# Patient Record
Sex: FEMALE | Employment: UNEMPLOYED | URBAN - METROPOLITAN AREA
[De-identification: names, ages, dates, MRNs, and addresses within clinical notes are randomized per-mention and may not be internally consistent; named-entity substitution may affect disease eponyms.]

---

## 2019-02-19 ENCOUNTER — OFFICE VISIT - RIVER FALLS (OUTPATIENT)
Dept: FAMILY MEDICINE | Facility: CLINIC | Age: 24
End: 2019-02-19

## 2019-02-19 ASSESSMENT — MIFFLIN-ST. JEOR: SCORE: 2014.1

## 2019-03-20 ENCOUNTER — OFFICE VISIT - RIVER FALLS (OUTPATIENT)
Dept: FAMILY MEDICINE | Facility: CLINIC | Age: 24
End: 2019-03-20

## 2019-03-20 ASSESSMENT — MIFFLIN-ST. JEOR: SCORE: 2021.36

## 2019-03-21 LAB
A/G RATIO - HISTORICAL: 1.4 (ref 1–2.5)
ALBUMIN SERPL-MCNC: 4 GM/DL (ref 3.6–5.1)
ALP SERPL-CCNC: 41 UNIT/L (ref 33–115)
ALT SERPL W P-5'-P-CCNC: 10 UNIT/L (ref 6–29)
AST SERPL W P-5'-P-CCNC: 10 UNIT/L (ref 10–30)
BILIRUB DIRECT SERPL-MCNC: 0.1 MG/DL
BILIRUB INDIRECT SERPL-MCNC: 0.3 MG/DL (ref 0.2–1.2)
BILIRUB SERPL-MCNC: 0.4 MG/DL (ref 0.2–1.2)
BUN SERPL-MCNC: 13 MG/DL (ref 7–25)
BUN/CREAT RATIO - HISTORICAL: NORMAL (ref 6–22)
CALCIUM SERPL-MCNC: 9.7 MG/DL (ref 8.6–10.2)
CHLORIDE BLD-SCNC: 108 MMOL/L (ref 98–110)
CO2 SERPL-SCNC: 23 MMOL/L (ref 20–32)
CREAT SERPL-MCNC: 0.94 MG/DL (ref 0.5–1.1)
EGFRCR SERPLBLD CKD-EPI 2021: 84 ML/MIN/1.73M2
ERYTHROCYTE [DISTWIDTH] IN BLOOD BY AUTOMATED COUNT: 12.4 % (ref 11–15)
GLOBULIN: 2.8 (ref 1.9–3.7)
GLUCOSE BLD-MCNC: 86 MG/DL (ref 65–99)
HCT VFR BLD AUTO: 38.2 % (ref 35–45)
HGB BLD-MCNC: 13.1 GM/DL (ref 11.7–15.5)
LITHIUM - HISTORICAL: 0.8 MMOL/L (ref 0.6–1.2)
MCH RBC QN AUTO: 30.7 PG (ref 27–33)
MCHC RBC AUTO-ENTMCNC: 34.3 GM/DL (ref 32–36)
MCV RBC AUTO: 89.5 FL (ref 80–100)
PLATELET # BLD AUTO: 321 10*3/UL (ref 140–400)
PMV BLD: 10.7 FL (ref 7.5–12.5)
POTASSIUM BLD-SCNC: 4.4 MMOL/L (ref 3.5–5.3)
PROT SERPL-MCNC: 6.8 GM/DL (ref 6.1–8.1)
RBC # BLD AUTO: 4.27 10*6/UL (ref 3.8–5.1)
SODIUM SERPL-SCNC: 139 MMOL/L (ref 135–146)
TSH SERPL DL<=0.005 MIU/L-ACNC: 1.95 MIU/L
WBC # BLD AUTO: 10.9 10*3/UL (ref 3.8–10.8)

## 2019-04-17 ENCOUNTER — OFFICE VISIT - RIVER FALLS (OUTPATIENT)
Dept: FAMILY MEDICINE | Facility: CLINIC | Age: 24
End: 2019-04-17

## 2019-04-17 ASSESSMENT — MIFFLIN-ST. JEOR: SCORE: 2025.89

## 2019-05-14 ENCOUNTER — COMMUNICATION - RIVER FALLS (OUTPATIENT)
Dept: FAMILY MEDICINE | Facility: CLINIC | Age: 24
End: 2019-05-14

## 2019-05-21 ENCOUNTER — OFFICE VISIT - RIVER FALLS (OUTPATIENT)
Dept: FAMILY MEDICINE | Facility: CLINIC | Age: 24
End: 2019-05-21

## 2019-05-21 ASSESSMENT — MIFFLIN-ST. JEOR: SCORE: 2016.82

## 2019-05-22 ENCOUNTER — COMMUNICATION - RIVER FALLS (OUTPATIENT)
Dept: FAMILY MEDICINE | Facility: CLINIC | Age: 24
End: 2019-05-22

## 2019-06-18 ENCOUNTER — OFFICE VISIT - RIVER FALLS (OUTPATIENT)
Dept: FAMILY MEDICINE | Facility: CLINIC | Age: 24
End: 2019-06-18

## 2019-06-18 ASSESSMENT — MIFFLIN-ST. JEOR: SCORE: 2048.57

## 2019-06-19 ENCOUNTER — COMMUNICATION - RIVER FALLS (OUTPATIENT)
Dept: FAMILY MEDICINE | Facility: CLINIC | Age: 24
End: 2019-06-19

## 2019-06-19 LAB
BASOPHILS # BLD MANUAL: 91 10*3/UL (ref 0–200)
BASOPHILS NFR BLD MANUAL: 0.6 %
BUN SERPL-MCNC: 12 MG/DL (ref 7–25)
BUN/CREAT RATIO - HISTORICAL: NORMAL (ref 6–22)
CALCIUM SERPL-MCNC: 9.3 MG/DL (ref 8.6–10.2)
CHLORIDE BLD-SCNC: 106 MMOL/L (ref 98–110)
CO2 SERPL-SCNC: 21 MMOL/L (ref 20–32)
CREAT SERPL-MCNC: 0.96 MG/DL (ref 0.5–1.1)
EGFRCR SERPLBLD CKD-EPI 2021: 82 ML/MIN/1.73M2
EOSINOPHIL # BLD MANUAL: 304 10*3/UL (ref 15–500)
EOSINOPHIL NFR BLD MANUAL: 2 %
ERYTHROCYTE [DISTWIDTH] IN BLOOD BY AUTOMATED COUNT: 12.3 % (ref 11–15)
GLUCOSE BLD-MCNC: 89 MG/DL (ref 65–99)
HCT VFR BLD AUTO: 40.9 % (ref 35–45)
HGB BLD-MCNC: 13.7 GM/DL (ref 11.7–15.5)
LITHIUM - HISTORICAL: 0.6 MMOL/L (ref 0.6–1.2)
LYMPHOCYTES # BLD MANUAL: 3770 10*3/UL (ref 850–3900)
LYMPHOCYTES NFR BLD MANUAL: 24.8 %
MCH RBC QN AUTO: 30.4 PG (ref 27–33)
MCHC RBC AUTO-ENTMCNC: 33.5 GM/DL (ref 32–36)
MCV RBC AUTO: 90.7 FL (ref 80–100)
MONOCYTES # BLD MANUAL: 684 10*3/UL (ref 200–950)
MONOCYTES NFR BLD MANUAL: 4.5 %
NEUTROPHILS # BLD MANUAL: ABNORMAL 10*3/UL (ref 1500–7800)
NEUTROPHILS NFR BLD MANUAL: 68.1 %
PLATELET # BLD AUTO: 307 10*3/UL (ref 140–400)
PMV BLD: 10 FL (ref 7.5–12.5)
POTASSIUM BLD-SCNC: 4 MMOL/L (ref 3.5–5.3)
RBC # BLD AUTO: 4.51 10*6/UL (ref 3.8–5.1)
SODIUM SERPL-SCNC: 136 MMOL/L (ref 135–146)
TSH SERPL DL<=0.005 MIU/L-ACNC: 4.99 MIU/L
WBC # BLD AUTO: 15.2 10*3/UL (ref 3.8–10.8)

## 2019-06-21 ENCOUNTER — COMMUNICATION - RIVER FALLS (OUTPATIENT)
Dept: FAMILY MEDICINE | Facility: CLINIC | Age: 24
End: 2019-06-21

## 2019-07-16 ENCOUNTER — OFFICE VISIT - RIVER FALLS (OUTPATIENT)
Dept: FAMILY MEDICINE | Facility: CLINIC | Age: 24
End: 2019-07-16

## 2019-07-16 ASSESSMENT — MIFFLIN-ST. JEOR: SCORE: 2071.25

## 2019-08-13 ENCOUNTER — OFFICE VISIT - RIVER FALLS (OUTPATIENT)
Dept: FAMILY MEDICINE | Facility: CLINIC | Age: 24
End: 2019-08-13

## 2019-08-13 ASSESSMENT — MIFFLIN-ST. JEOR: SCORE: 2083.95

## 2019-08-14 ENCOUNTER — COMMUNICATION - RIVER FALLS (OUTPATIENT)
Dept: FAMILY MEDICINE | Facility: CLINIC | Age: 24
End: 2019-08-14

## 2019-08-14 LAB
BUN SERPL-MCNC: 10 MG/DL (ref 7–25)
BUN/CREAT RATIO - HISTORICAL: NORMAL (ref 6–22)
CALCIUM SERPL-MCNC: 9.4 MG/DL (ref 8.6–10.2)
CHLORIDE BLD-SCNC: 107 MMOL/L (ref 98–110)
CO2 SERPL-SCNC: 24 MMOL/L (ref 20–32)
CREAT SERPL-MCNC: 1 MG/DL (ref 0.5–1.1)
EGFRCR SERPLBLD CKD-EPI 2021: 78 ML/MIN/1.73M2
GLUCOSE BLD-MCNC: 92 MG/DL (ref 65–99)
LITHIUM - HISTORICAL: 0.6 MMOL/L (ref 0.6–1.2)
POTASSIUM BLD-SCNC: 4.1 MMOL/L (ref 3.5–5.3)
SODIUM SERPL-SCNC: 139 MMOL/L (ref 135–146)
TSH SERPL DL<=0.005 MIU/L-ACNC: 2.67 MIU/L

## 2019-09-10 ENCOUNTER — OFFICE VISIT - RIVER FALLS (OUTPATIENT)
Dept: FAMILY MEDICINE | Facility: CLINIC | Age: 24
End: 2019-09-10

## 2019-09-10 ASSESSMENT — MIFFLIN-ST. JEOR: SCORE: 2092.12

## 2019-09-17 ENCOUNTER — OFFICE VISIT - RIVER FALLS (OUTPATIENT)
Dept: FAMILY MEDICINE | Facility: CLINIC | Age: 24
End: 2019-09-17

## 2019-09-17 ASSESSMENT — MIFFLIN-ST. JEOR: SCORE: 2064.9

## 2019-10-08 ENCOUNTER — OFFICE VISIT - RIVER FALLS (OUTPATIENT)
Dept: FAMILY MEDICINE | Facility: CLINIC | Age: 24
End: 2019-10-08

## 2019-10-08 ASSESSMENT — MIFFLIN-ST. JEOR: SCORE: 2084.86

## 2019-11-14 ENCOUNTER — OFFICE VISIT - RIVER FALLS (OUTPATIENT)
Dept: FAMILY MEDICINE | Facility: CLINIC | Age: 24
End: 2019-11-14

## 2019-11-14 ENCOUNTER — COMMUNICATION - RIVER FALLS (OUTPATIENT)
Dept: FAMILY MEDICINE | Facility: CLINIC | Age: 24
End: 2019-11-14

## 2019-11-14 ASSESSMENT — MIFFLIN-ST. JEOR: SCORE: 2101.19

## 2020-02-17 ENCOUNTER — COMMUNICATION - RIVER FALLS (OUTPATIENT)
Dept: FAMILY MEDICINE | Facility: CLINIC | Age: 25
End: 2020-02-17

## 2020-02-20 ENCOUNTER — COMMUNICATION - RIVER FALLS (OUTPATIENT)
Dept: FAMILY MEDICINE | Facility: CLINIC | Age: 25
End: 2020-02-20

## 2022-02-11 VITALS
BODY MASS INDEX: 43.89 KG/M2 | HEIGHT: 68 IN | BODY MASS INDEX: 42.98 KG/M2 | TEMPERATURE: 98 F | HEIGHT: 68 IN | DIASTOLIC BLOOD PRESSURE: 70 MMHG | DIASTOLIC BLOOD PRESSURE: 70 MMHG | WEIGHT: 283.6 LBS | BODY MASS INDEX: 43.65 KG/M2 | HEART RATE: 86 BPM | WEIGHT: 289.6 LBS | BODY MASS INDEX: 44.19 KG/M2 | DIASTOLIC BLOOD PRESSURE: 86 MMHG | WEIGHT: 288 LBS | SYSTOLIC BLOOD PRESSURE: 110 MMHG | HEIGHT: 68 IN | DIASTOLIC BLOOD PRESSURE: 72 MMHG | WEIGHT: 291.6 LBS | OXYGEN SATURATION: 98 % | SYSTOLIC BLOOD PRESSURE: 122 MMHG | HEART RATE: 82 BPM | SYSTOLIC BLOOD PRESSURE: 124 MMHG | HEART RATE: 72 BPM | OXYGEN SATURATION: 97 % | HEART RATE: 80 BPM | HEIGHT: 68 IN | SYSTOLIC BLOOD PRESSURE: 132 MMHG

## 2022-02-11 VITALS
WEIGHT: 273 LBS | DIASTOLIC BLOOD PRESSURE: 76 MMHG | WEIGHT: 274 LBS | HEART RATE: 84 BPM | BODY MASS INDEX: 41.68 KG/M2 | OXYGEN SATURATION: 97 % | HEIGHT: 68 IN | HEIGHT: 68 IN | SYSTOLIC BLOOD PRESSURE: 128 MMHG | SYSTOLIC BLOOD PRESSURE: 126 MMHG | BODY MASS INDEX: 41.37 KG/M2 | DIASTOLIC BLOOD PRESSURE: 64 MMHG | BODY MASS INDEX: 41.52 KG/M2 | DIASTOLIC BLOOD PRESSURE: 78 MMHG | WEIGHT: 275 LBS | HEIGHT: 68 IN | HEART RATE: 62 BPM | HEART RATE: 72 BPM | SYSTOLIC BLOOD PRESSURE: 126 MMHG

## 2022-02-11 VITALS
OXYGEN SATURATION: 98 % | HEART RATE: 84 BPM | SYSTOLIC BLOOD PRESSURE: 112 MMHG | DIASTOLIC BLOOD PRESSURE: 62 MMHG | HEIGHT: 68 IN | BODY MASS INDEX: 41.28 KG/M2 | WEIGHT: 272.4 LBS

## 2022-02-11 VITALS
BODY MASS INDEX: 42.44 KG/M2 | HEART RATE: 72 BPM | HEART RATE: 72 BPM | WEIGHT: 287.8 LBS | DIASTOLIC BLOOD PRESSURE: 64 MMHG | WEIGHT: 280 LBS | HEIGHT: 68 IN | BODY MASS INDEX: 43.62 KG/M2 | BODY MASS INDEX: 43.19 KG/M2 | WEIGHT: 285 LBS | HEIGHT: 68 IN | DIASTOLIC BLOOD PRESSURE: 74 MMHG | HEART RATE: 64 BPM | SYSTOLIC BLOOD PRESSURE: 122 MMHG | SYSTOLIC BLOOD PRESSURE: 124 MMHG | SYSTOLIC BLOOD PRESSURE: 122 MMHG | HEIGHT: 68 IN | DIASTOLIC BLOOD PRESSURE: 60 MMHG

## 2022-02-16 NOTE — PROGRESS NOTES
Patient:   JACLYN SARMIENTO            MRN: [id number]            FIN: [id number]               Age:   23 years     Sex:  Female     :  1995   Associated Diagnoses:   Bipolar disorder; JENNY (generalized anxiety disorder); Depression; Morbid obesity; Medication management; Tobacco user   Author:   Marco A Craig MD      Chief Complaint   3/20/2019 12:23 PM CDT   Mood fu was started on Citalopram, has not had any side effects and has not noticed any differences in mood      History of Present Illness   see chief complaint as noted above and confirmed with the patient     23 year old female here today for follow up with mood. She was initially seen on 19 to establish care as she had recently moved here to be with her brother to better stabilize her mood and overall health and well being. She has hopes that her being with her brother will help her to get out and get a job and improve herself. She has bipolar disorder, Depression, Anxiety, Hypertension, a history of Asthma, Obesity, and is a current smoker. She had been on many depression/anxiety in the past and has not tolerated them well she tells us she had side effects from those medications and struggled to take them. At previous visit she was started on Citalopram 20mg daily, she has been taking this medication and has not noticed any side effects from medication, she also has not noticed any difference in mood since starting the medication. She notes that she has always been on capsules for depression/anxiety medications and the Citalopram is a tablet. She also would like to go to a therapist and wonders if there is anyone around here that we would recommend. She says since moving to [place] things have been going good, she has been getting along with her brother well, she mentions he is messy and she likes a clean house, she is working with him to find a good happy medium. She also says she struggles to get out of the house as she is fearful to get  out. She does try to get out with her brother when he leaves, she lets him drive and feels better this way. She says her mom has been calling her everyday to make sure she is okay. She did drive to the pharmacy by herself and she was very proud of herself for this.       Review of Systems   Constitutional:  No fever, No chills.    Ear/Nose/Mouth/Throat:  No nasal congestion, No sore throat.    Respiratory:  No shortness of breath, No cough.    Cardiovascular:  No chest pain, No palpitations.    Gastrointestinal:  No nausea, No vomiting, No diarrhea, No abdominal pain.    Genitourinary:  No dysuria, No hematuria.    Integumentary:  No rash.    Neurologic:  Alert and oriented X4, No headache.    Psychiatric:  Anxiety, Depression, No gaurav, Not suicidal, Not delusional, No hallucinations.       Health Status   Allergies:    Allergic Reactions (Selected)  Severe  Metamucil (Throat swelling and facial swelling)   Medications:  (Selected)   Prescriptions  Prescribed  ALPRAZolam 1 mg oral tablet: = 1 tab(s) ( 1 mg ), PO, q6-8 hrs, PRN: for anxiety, # 30 tab(s), 0 Refill(s), Type: Maintenance, Pharmacy: [facility] [id number], 1 tab(s) Oral q6-8 hrs,PRN:for anxiety  Reclipsen 0.15 mg-0.03 mg oral tablet: 1 tab(s), Oral, daily, # 28 tab(s), 3 Refill(s), Type: Maintenance, Pharmacy: [facility] [id number], 1 tab(s) Oral daily  SEROquel 200 mg oral tablet: = 1 tab(s) ( 200 mg ), Oral, hs, # 30 tab(s), 0 Refill(s), Type: Maintenance, Pharmacy: [facility] [id number], 1 tab(s) Oral hs  citalopram 20 mg oral tablet: = 1 tab(s) ( 20 mg ), PO, Daily, # 30 tab(s), 0 Refill(s), Type: Maintenance, Pharmacy: [facility] [id number], 1 tab(s) Oral daily  lisinopril 20 mg oral tablet: = 1 tab(s) ( 20 mg ), PO, Daily, # 30 tab(s), 0 Refill(s), Type: Maintenance, Pharmacy: [facility] [id number], 1 tab(s) Oral daily  lithium 300 mg oral capsule: See Instructions, Instructions: 2 caps every am and 1 cap at hs, # 90  cap(s), 0 Refill(s), Type: Maintenance, Pharmacy: [facility] [id number], 2 caps every am and 1 cap at hs  propranolol 60 mg oral tablet: = 1 tab(s) ( 60 mg ), Oral, tid, # 90 tab(s), 0 Refill(s), Type: Maintenance, Pharmacy: [facility] [id number], 1 tab(s) Oral tid,x30 day(s)  tiZANidine 4 mg oral tablet: 1 tab(s), Oral, q8 hrs, PRN: AS NEEDED FOR BACK PAIN, # 30 tab(s), Type: Soft Stop, Pharmacy: [facility] [id number]   Problem list:    All Problems  Bipolar disorder / SNOMED CT 34083953 / Confirmed  JENNY (generalized anxiety disorder) / SNOMED CT 57180055 / Confirmed  History of asthma / SNOMED CT 391433789 / Confirmed  Morbid obesity / SNOMED CT 084449880 / Probable  Tobacco user / SNOMED CT 277612893 / Probable      Histories   Past Medical History:    No active or resolved past medical history items have been selected or recorded.   Family History:    No family history items have been selected or recorded.   Procedure history:    Appendectomy (789414737) in 2009 at 14 Years.  Myringotomy and insertion of T tube (627960971).   Social History:        Alcohol Assessment            Current, 1-2 times per month      Tobacco Assessment            Smoker, current status unknown, Cigarettes, 10 per day.      Substance Abuse Assessment            Current, Marijuana      Nutrition and Health Assessment            Type of diet: Regular.      Exercise and Physical Activity Assessment            Exercise frequency: Never.      Sexual Assessment            Sexually active: No.  Identifies as female, Sexual orientation: Lesbian, morales or homosexual.  Contraceptive               Use Details: Birth control pill.      Physical Examination   Vital Signs   3/20/2019 12:23 PM CDT Peripheral Pulse Rate 84 bpm    Pulse Site Radial artery    Systolic Blood Pressure 126 mmHg    Diastolic Blood Pressure 78 mmHg    Mean Arterial Pressure 94 mmHg    BP Site Right arm    Oxygen Saturation 97 %      Measurements from flowsheet :  Measurements   3/20/2019 12:23 PM CDT Height Measured - Standard 68 in    Weight Measured - Standard 274 lb    BSA 2.44 m2    Body Mass Index 41.66 kg/m2  HI      General:  Alert and oriented, No acute distress.    Eye:  Pupils are equal, round and reactive to light, Normal conjunctiva.    HENT:  Oral mucosa is moist.    Neck:  Supple.    Respiratory:  Respirations are non-labored.    Cardiovascular:  Normal rate, Regular rhythm, No edema.    Gastrointestinal:  Non-distended.    Musculoskeletal:  Normal gait.    Integumentary:  Warm, No rash.    Psychiatric:  Cooperative, Appropriate mood & affect, Normal judgment.       Review / Management   Results review:  Lab results   3/20/2019 12:57 PM CDT Sodium Level 139 mmol/L    Potassium Level 4.4 mmol/L    Chloride Level 108 mmol/L    CO2 Level 23 mmol/L    Glucose Level 86 mg/dL    BUN 13 mg/dL    Creatinine Level 0.94 mg/dL    BUN/Creat Ratio NOT APPLICABLE    eGFR 84 mL/min/1.73m2    eGFR African American 98 mL/min/1.73m2    Calcium Level 9.7 mg/dL    Bilirubin Total 0.4 mg/dL    Bilirubin Direct 0.1 mg/dL    Bilirubin Indirect 0.3    Alkaline Phosphatase 41 unit/L    AST/SGOT 10 unit/L    ALT/SGPT 10 unit/L    Protein Total 6.8 gm/dL    Albumin Level 4.0 gm/dL    Globulin 2.8    A/G Ratio 1.4    TSH 1.95 mIU/L    WBC 10.9  HI    RBC 4.27    Hgb 13.1 gm/dL    Hct 38.2 %    MCV 89.5 fL    MCH 30.7 pg    MCHC 34.3 gm/dL    RDW 12.4 %    Platelet 321    MPV 10.7 fL    Lithium Level 0.8 mmol/L   .       Impression and Plan       Diagnosis     Bipolar disorder (NUK89-IY F31.9).     JENNY (generalized anxiety disorder) (VLB17-HN F41.1).     Depression (WKU09-HT F32.9).     Morbid obesity (TZK06-SB E66.01).     Medication management (KVF68-FK Z79.899).     Tobacco user (ZJG00-RX Z72.0).     Plan:  Continue with current medications.     Stay on the Citalopram as may have not noticed effects but since not having any side effects should continue it as may need more time for it to  begin taking effect.     Lab work done today-results will be mailed to home adress.     .    Summary:  Overall she is doing okay, she is still getting acclimated to living with brother but does feel it is going well.     Medications refilled for one month.     Labs done today.     Discussed importance of trying to go out for walks, she is fearful of leaving the home and if she does she goes with her brother. Discussed starting out slowly such as walking around one block the first day and then if she does okay can increase to two blocks the next day. Encouraged she do this or she will not get over the fear. She will try this. Also gave another example of going to the library and renting out one book.     Reffered to a counseler per patients request. .    I, Isabella Mendez Medical Assistant acted solely as a scribe for, and in presence of Dr. Marco A Craig who performed the services.

## 2022-02-16 NOTE — TELEPHONE ENCOUNTER
---------------------  From: Letha Santiago LPN   Sent: 10/18/2019 11:34:46 AM CDT  Subject: bleeding     PCP:   Merissa TROY     Time of Call:  _ 1106       Person Calling:  _ pt  Phone number:  _ [phone]    Note:   _ Pt LM stating that she has had her period for 2.5 weeks and is on a blood thinner.   Called her and LM stating she needs to make an appointment to be seen as this is a criteria to be seen by a medical provider.pt called back at 1123 wondering if she could be seen anywhere. Called pt at 1144 and stated that she could be seen anywhere but that she would need to be seen. Stressed the importance of being seen when on a blood thinner and having increased/abnormal bleeding. informed her that she should have been educated about his. She stated she had not been.

## 2022-02-16 NOTE — TELEPHONE ENCOUNTER
---------------------From: Jeanne Mejia CMA To: [facility] Message Pool ([address]_[facility]);   Sent: 7/1/2019 3:21:37 PM CDTSubject: Refills PCP:   None, lashell Tong  Time of Call:  1507   Person Calling:  PatientPhone number:  [phone]:   Patient called stating she needs her Lithium filled as she only has one tab left. There are already two pending refill requests on patients medication list. Patient due back 7/18/19 for med check. Also, see attached documentation.Last office visit and reason:  6/18/19 Med check w/ Ran---------------------From: Missy Tran CMA ([facility] Message Pool ([address]_[facility])) To: Marco A Craig MD;   Sent: 7/1/2019 3:29:23 PM CDTSubject: FW: Refills---------------------From: Marco A Craig MD To: [facility] Message Pool ([address]_[facility]);   Sent: 7/1/2019 3:30:43 PM CDTSubject: RE: Refills ok to refill for 30 daysPt called and informed both her lithium and Seroquel where refilled today.

## 2022-02-16 NOTE — NURSING NOTE
Comprehensive Intake Entered On:  9/10/2019 11:23 AM CDT    Performed On:  9/10/2019 11:20 AM CDT by Idris Back               Summary   Chief Complaint :   Pt here for med check. Pt states that she feels good but could be better.    Menstrual Status :   Menarcheal   Weight Measured :   289.6 lb(Converted to: 289 lb 10 oz, 131.36 kg)    Height Measured :   68 in(Converted to: 5 ft 8 in, 172.72 cm)    Body Mass Index :   44.03 kg/m2 (HI)    Body Surface Area :   2.51 m2   Systolic Blood Pressure :   110 mmHg   Diastolic Blood Pressure :   70 mmHg   Mean Arterial Pressure :   83 mmHg   Peripheral Pulse Rate :   80 bpm   BP Site :   Right arm   BP Method :   Manual   HR Method :   Manual   Idris Back - 9/10/2019 11:20 AM CDT   Health Status   Allergies Verified? :   Yes   Medication History Verified? :   Yes   Medical History Verified? :   Yes   Pre-Visit Planning Status :   Completed   Idris Back - 9/10/2019 11:20 AM CDT   Consents   Consent for Immunization Exchange :   Consent Granted   Consent for Immunizations to Providers :   Consent Granted   Idris Back - 9/10/2019 11:20 AM CDT   Meds / Allergies   (As Of: 9/10/2019 11:23:43 AM CDT)   Allergies (Active)   Metamucil  Estimated Onset Date:   Unspecified ; Reactions:   throat swelling, facial swelling ; Created By:   Jovana Epps; Reaction Status:   Active ; Category:   Drug ; Substance:   Metamucil ; Type:   Allergy ; Severity:   Severe ; Updated By:   Jovana Epps; Reviewed Date:   9/10/2019 11:23 AM CDT        Medication List   (As Of: 9/10/2019 11:23:43 AM CDT)   Prescription/Discharge Order    liraglutide  :   liraglutide ; Status:   Prescribed ; Ordered As Mnemonic:   Saxenda 18 mg/3 mL subcutaneous solution ; Simple Display Line:   1.2 mg, Subcutaneous, daily, 15 mL, 2 Refill(s) ; Ordering Provider:   Marco A Craig MD; Catalog Code:   liraglutide ; Order Dt/Tm:   8/22/2019 9:49:41 AM           tiZANidine  :   tiZANidine ; Status:   Prescribed ; Ordered As Mnemonic:   tiZANidine 4 mg oral tablet ; Simple Display Line:   1 tab(s), Oral, q8 hrs, PRN: AS NEEDED FOR BACK PAIN, 60 tab(s), 0 Refill(s) ; Ordering Provider:   Marco A Craig MD; Catalog Code:   tiZANidine ; Order Dt/Tm:   8/13/2019 11:39:26 AM          ALPRAZolam  :   ALPRAZolam ; Status:   Prescribed ; Ordered As Mnemonic:   ALPRAZolam 1 mg oral tablet ; Simple Display Line:   1 mg, 1 tab(s), PO, q6-8 hrs, PRN: for anxiety, 30 tab(s), 0 Refill(s) ; Ordering Provider:   Marco A Craig MD; Catalog Code:   ALPRAZolam ; Order Dt/Tm:   8/13/2019 11:39:15 AM          QUEtiapine  :   QUEtiapine ; Status:   Prescribed ; Ordered As Mnemonic:   QUEtiapine 200 mg oral tablet ; Simple Display Line:   1 tab(s), Oral, qhs, see provider for follow up before next refill., 30 tab(s), 0 Refill(s) ; Ordering Provider:   Marco A Craig MD; Catalog Code:   QUEtiapine ; Order Dt/Tm:   8/4/2019 12:06:07 PM          propranolol  :   propranolol ; Status:   Prescribed ; Ordered As Mnemonic:   propranolol 60 mg oral tablet ; Simple Display Line:   1 tab(s), Oral, tid, 270 tab(s), 0 Refill(s) ; Ordering Provider:   Marco A Craig MD; Catalog Code:   propranolol ; Order Dt/Tm:   8/4/2019 12:05:52 PM          lithium  :   lithium ; Status:   Prescribed ; Ordered As Mnemonic:   lithium 300 mg oral capsule ; Simple Display Line:   See Instructions, TAKE 2 CAPSULES BY MOUTH EVERY MORNING, 1 CAPSULE IN THE AFTERNOON, AND 1 CAPSULE AT NIGHT, 120 cap(s), 0 Refill(s) ; Ordering Provider:   Marco A Craig MD; Catalog Code:   lithium ; Order Dt/Tm:   8/4/2019 12:05:30 PM          lisinopril  :   lisinopril ; Status:   Prescribed ; Ordered As Mnemonic:   lisinopril 20 mg oral tablet ; Simple Display Line:   1 tab(s), Oral, daily, 90 tab(s), 3 Refill(s) ; Ordering Provider:   Marco A Craig MD; Catalog Code:   lisinopril ; Order Dt/Tm:   8/4/2019 12:05:11 PM           citalopram  :   citalopram ; Status:   Prescribed ; Ordered As Mnemonic:   citalopram 40 mg oral tablet ; Simple Display Line:   40 mg, 1 tab(s), Oral, daily, for 90 day(s), 90 tab(s), 1 Refill(s) ; Ordering Provider:   Marco A Craig MD; Catalog Code:   citalopram ; Order Dt/Tm:   6/18/2019 11:36:57 AM          desogestrel-ethinyl estradiol  :   desogestrel-ethinyl estradiol ; Status:   Prescribed ; Ordered As Mnemonic:   Reclipsen 0.15 mg-0.03 mg oral tablet ; Simple Display Line:   1 tab(s), Oral, daily, 28 tab(s), 0 Refill(s) ; Ordering Provider:   Marco A Craig MD; Catalog Code:   desogestrel-ethinyl estradiol ; Order Dt/Tm:   5/21/2019 1:17:28 PM

## 2022-02-16 NOTE — TELEPHONE ENCOUNTER
---------------------  From: Jeanne Mejia CMA   To: [facility] Message Pool ([address]_[place] [facility] [place]);     Sent: 7/24/2019 11:30:57 AM CDT  Subject: Food Stamp Denial      PCP:   None, requested Ran      Time of Call:  1045       Person Calling:  Patient  Phone number:  [phone]    Returned call at: [phone]    Note:   Patient states her food stamps are being taken away due to her not working. She is requesting Ran fax a note to # [phone] stating patient cannot work due to her mental health. Patient advised Ran returns next week and ok waiting.    Last office visit and reason:  7/16/19 Med check w/ Ran---------------------  From: Missy Tran CMA ([employer] Pool ([address]_[facility]))   To: Marco A Craig MD;     Sent: 7/24/2019 12:53:22 PM CDT  Subject: FW: Food Stamp Denial---------------------  From: Marco A Craig MD   To: [facility] Message Pool ([address]_[place] [facility] [place]);     Sent: 7/27/2019 1:06:12 PM CDT  Subject: RE: Food Stamp Denial      did noteLMTCB at 1:19pm. I wanted to let her know that letter was faxed. I also wanted to see if she wanted a copy of the letter for her own records.Pt calling back at 1410. I asked her if she wanted a copy and she declined at this time. She is aware it is in her chart if decides she wants a copy.

## 2022-02-16 NOTE — TELEPHONE ENCOUNTER
---------------------  From: Gabriella Hamilton CMA (Phone Messages Pool ([address]_[facility]))   To: [facility] Message Pool ([address]_[place] [facility] [place]);     Sent: 5/21/2019 1:10:07 PM CDT  Subject: General Message-med refills     Phone Message    PCP:   Mary Ann      Time of Call:  1259       Person Calling:  self  Phone number:  [phone]    Returned call at: _    Note:   pt seen today and had her meds refilled, but sent to [facility] and wanted them here at [facility].  States they can't transfer due to the alprazolam.  Please resend    Last office visit and reason:  5/21---------------------  From: Missy Tran CMA ([facility] Message Pool ([address]_[facility]))   To: Marco A Craig MD;     Sent: 5/21/2019 1:21:13 PM CDT  Subject: FW: General Message-med refills     I sent the alprazolam to you to sign off on again---------------------  From: Marco A Craig MD   To: [facility] Message Pool ([address]_[place] [facility] [place]);     Sent: 5/21/2019 1:54:17 PM CDT  Subject: RE: General Message-med refills     done

## 2022-02-16 NOTE — TELEPHONE ENCOUNTER
---------------------  From: Missy Tran CMA ([employer] Pool ([address]_[facility]))   To: Marco A Craig MD;     Sent: 4/3/2019 10:11:27 AM CDT  Subject: FW: Medication Management   Due Date/Time: 4/3/2019 11:23:00 AM CDT     Medication Refill needing approval    PCP:   Mary Ann    Medication:   Tizanidine 4mg  Last Filled:  3/20/19    Quantity:  30  Refills:  0  CSA on file?   na     Date of last office visit and reason:   3/20/19 Med check  Date of last labs pertaining to condition:  na    Note:  Please advise if ok to refill. I contacted [facility] and they state pt did pick this up on 3/20/19 and is now out and needing more.     Return to Clinic order placed?  Yes due f/u at the end of April          ------------------------------------------  From: [facility] [id number]  To: Marco A Craig MD  Sent: April 2, 2019 11:23:35 AM CDT  Subject: Medication Management  Due: April 3, 2019 11:23:35 AM CDT    ** On Hold Pending Signature **  Drug: tiZANidine (tiZANidine 4 mg oral tablet)  1 TAB(S) ORAL Q8 HRS,PRN:AS NEEDED FOR BACK PAIN  Quantity: 30 tab(s)     Days Supply: 0         Refills: 0  Substitutions Allowed  Notes from Pharmacy:     Dispensed Drug: tiZANidine (tiZANidine 4 mg oral tablet)  TAKE 1 TABLET BY MOUTH EVERY 8 HOURS AS NEEDED FOR BACK PAIN  Quantity: 30 tab(s)     Days Supply: 10        Refills: 0  Substitutions Allowed  Notes from Pharmacy:   ---------------------------------------------------------------  From: Marco A Craig MD   To: [facility] [id number]    Sent: 4/3/2019 11:50:23 AM CDT  Subject: FW: Medication Management     ** Submitted: **  Complete:tiZANidine (tiZANidine 4 mg oral tablet)   Signed by Marco A Craig MD  4/3/2019 11:50:00 AM    ** Approved **  tiZANidine (TIZANIDINE 4MG TABLETS)  TAKE 1 TABLET BY MOUTH EVERY 8 HOURS AS NEEDED FOR BACK PAIN  Qty:  30 tab(s)        Days Supply:  10        Refills:  0          Substitutions Allowed     Route To Pharmacy -  [facility] [address]

## 2022-02-16 NOTE — PROGRESS NOTES
Chief Complaint    Would like to take about medications. Was visiting mom and she states that she had noticed  History of Present Illness      Patient feels like this is been a good month for her.  She traveled and visited with her brother and mom and the length she actually made a trip to go by her own groceries and she has seen things as positive and hopeful that she will improve.  When visiting her mom her mom noted how much happier she seemed and how much more talkative she was at that time  Review of Systems      No headache, no fever or chills, no suicidal thoughts, no manic behaviors  Physical Exam   Vitals & Measurements    HR: 72(Peripheral)  BP: 126/64     HT: 68 in  WT: 273 lb  BMI: 41.5       Alert and oriented talking with normal speech patterns and good cognition       No tremors       No facial tics or grimaces       Normal gait  Assessment/Plan       1. Bipolar disorder (F31.9)         Patient with severe disease but seems to be improving.  We went over multiple goals she could set for this month I have recommended she try to repeat going to the grocery store and enjoys spring weather and take it at least a couple of walks per week         Ordered:          lithium, See Instructions, Instructions: 2 caps every AM, 1 cap in the afternoon, and 1 cap at night, # 120 tab(s), 0 Refill(s), Type: Hard Stop, Pharmacy: [id number], (Completed)          lithium, See Instructions, Instructions: 2 caps every AM, 1 cap in the afternoon, and 1 cap at night, # 120 tab(s), 0 Refill(s), Type: Maintenance, Pharmacy: [id number], 2 caps every AM, 1 cap in the afternoon, and 1 cap at night, (Ordered)          lithium, See Instructions, Instructions: 2 caps every AM, 1 cap in the afternoon, and 1 cap at night, # 120 tab(s), 0 Refill(s), Type: Hard Stop, Pharmacy: [facility] [id number], (Completed)                2. JENNY (generalized anxiety disorder) (F41.1)                3. History of  asthma (Z87.09)                4. Morbid obesity (E66.01)                Orders:         ALPRAZolam, = 1 tab(s) ( 1 mg ), PO, q6-8 hrs, PRN: for anxiety, # 30 tab(s), 0 Refill(s), Type: Maintenance, Pharmacy: [id number], 1 tab(s) Oral q6-8 hrs,PRN:for anxiety, (Ordered)         ALPRAZolam, = 1 tab(s) ( 1 mg ), PO, q6-8 hrs, PRN: for anxiety, # 30 tab(s), 0 Refill(s), Type: Hard Stop, Pharmacy: [id number], (Completed)         ALPRAZolam, = 1 tab(s) ( 1 mg ), PO, q6-8 hrs, PRN: for anxiety, # 30 tab(s), 0 Refill(s), Type: Hard Stop, Pharmacy: [id number], (Completed)         citalopram, = 1 tab(s) ( 40 mg ), Oral, daily, # 30 tab(s), 0 Refill(s), Type: Hard Stop, Pharmacy: [id number], dose change, (Completed)         citalopram, = 1 tab(s) ( 40 mg ), Oral, daily, # 30 tab(s), 0 Refill(s), Type: Maintenance, Pharmacy: [id number], dose change, 1 tab(s) Oral daily, (Ordered)         citalopram, = 1 tab(s) ( 40 mg ), Oral, daily, # 30 tab(s), 0 Refill(s), Type: Hard Stop, Pharmacy: [id number], dose change, (Completed)         desogestrel-ethinyl estradiol, 1 tab(s), Oral, daily, # 28 tab(s), 0 Refill(s), Type: Maintenance, Pharmacy: [id number], 1 tab(s) Oral daily, (Ordered)         desogestrel-ethinyl estradiol, 1 tab(s), Oral, daily, # 28 tab(s), 3 Refill(s), Type: Hard Stop, Pharmacy: [facility] [id number], (Completed)         desogestrel-ethinyl estradiol, 1 tab(s), Oral, daily, # 28 tab(s), 0 Refill(s), Type: Hard Stop, Pharmacy: [id number], (Completed)         QUEtiapine, = 1 tab(s) ( 200 mg ), Oral, hs, # 30 tab(s), 0 Refill(s), Type: Hard Stop, Pharmacy: [facility], (Completed)         QUEtiapine, = 1 tab(s) ( 200 mg ), Oral, hs, # 30 tab(s), 0 Refill(s), Type: Hard Stop, Pharmacy: [id number], (Completed)         QUEtiapine, = 1 tab(s) ( 200 mg ), Oral,  hs, # 30 tab(s), 0 Refill(s), Type: Maintenance, Pharmacy: [id number], 1 tab(s) Oral hs, (Ordered)         tiZANidine, = 1 tab(s), Oral, q8 hrs, PRN: AS NEEDED FOR BACK PAIN, # 30 tab(s), 0 Refill(s), Type: Hard Stop, Pharmacy: [facility] [id number], (Completed)         tiZANidine, = 1 tab(s), Oral, q8 hrs, PRN: AS NEEDED FOR BACK PAIN, # 30 tab(s), 0 Refill(s), Type: Hard Stop, Pharmacy: [id number], (Completed)         tiZANidine, = 1 tab(s), Oral, q8 hrs, PRN: AS NEEDED FOR BACK PAIN, # 30 tab(s), 0 Refill(s), Type: Soft Stop, Pharmacy: [facility] [id number], 1 tab(s) Oral q8 hrs,PRN:AS NEEDED FOR BACK PAIN, (Ordered)         Return to Clinic (Request), RFV: Medication management visit, Return in 1 month         Return to Clinic (Request), RFV: Depression med check, Return in 1 month  Patient Information     Name:JACLYN SARMIENTO      Address:      [address] [place] [address]     Sex:Female     YOB: 1995     [phone]     Emergency Contact:EVERTON BROWN     MRN:[id number]     FIN:[id number]     Location:[facility]     Date of Service:05/21/2019      Primary Care Physician:       NONE ,       Attending Physician:       Marco A Craig MD, [phone]  Problem List/Past Medical History    Ongoing     Bipolar disorder     Depression     JENNY (generalized anxiety disorder)     History of asthma     Hypertension     Morbid obesity     Tobacco user    Historical     No qualifying data  Procedure/Surgical History     Appendectomy (2009)     Myringotomy and insertion of T tube  Medications        propranolol 60 mg oral tablet: 60 mg, 1 tab(s), Oral, tid, 270 tab(s), 0 Refill(s).        lisinopril 20 mg oral tablet: 20 mg, 1 tab(s), PO, Daily, 90 tab(s), 0 Refill(s).        citalopram 40 mg oral tablet: 40 mg, 1 tab(s), Oral, daily, 30 tab(s), 0 Refill(s).        ALPRAZolam 1 mg oral tablet: 1 mg, 1 tab(s), PO, q6-8  hrs, PRN: for anxiety, 30 tab(s), 0 Refill(s).        Reclipsen 0.15 mg-0.03 mg oral tablet: 1 tab(s), Oral, daily, 28 tab(s), 0 Refill(s).        lithium 300 mg oral capsule: See Instructions, 2 caps every AM, 1 cap in the afternoon, and 1 cap at night, 120 tab(s), 0 Refill(s).        SEROquel 200 mg oral tablet: 200 mg, 1 tab(s), Oral, hs, 30 tab(s), 0 Refill(s).        tiZANidine 4 mg oral tablet: 1 tab(s), Oral, q8 hrs, PRN: AS NEEDED FOR BACK PAIN, 30 tab(s), 0 Refill(s).         Allergies    Metamucil (throat swelling, facial swelling)  Social History    Smoking Status - 03/20/2019     Current every day smoker     Alcohol      Current, 1-2 times per month, 02/21/2019     Exercise and Physical Activity      Exercise frequency: Never., 02/21/2019     Nutrition and Health      Type of diet: Regular., 02/21/2019     Sexual      Sexually active: No. Identifies as female, Sexual orientation: Lesbian, morales or homosexual. Contraceptive Use Details: Birth control pill., 02/21/2019     Substance Abuse      Current, Marijuana, 02/21/2019     Tobacco      Smoker, current status unknown, Cigarettes, 10 per day., 02/21/2019  Immunizations      Vaccine Date Status      tetanus/diphth/pertuss (Tdap) adult/adol 11/16/2009 Recorded      MMR (measles/mumps/rubella) 10/18/1994 Recorded      MMR (measles/mumps/rubella) 10/18/1994 Recorded      OPV 09/27/1994 Recorded      Hep B 09/27/1994 Recorded      DTaP-Hib 09/27/1994 Recorded      OPV 04/11/1994 Recorded      DTaP-Hib 04/11/1994 Recorded      OPV 1993 Recorded      DTaP-Hib 1993 Recorded      OPV 1993 Recorded      Hep B 1993 Recorded      DTaP-Hib 1993 Recorded      Hep B 1993 Recorded  Lab Results          Lab Results (Last 4 results within 90 days)           Sodium Level: 139 mmol/L [135 mmol/L - 146 mmol/L] (03/20/19 12:57:00)          Potassium Level: 4.4 mmol/L [3.5 mmol/L - 5.3 mmol/L] (03/20/19 12:57:00)          Chloride Level: 108  mmol/L [98 mmol/L - 110 mmol/L] (03/20/19 12:57:00)          CO2 Level: 23 mmol/L [20 mmol/L - 32 mmol/L] (03/20/19 12:57:00)          Glucose Level: 86 mg/dL [65 mg/dL - 99 mg/dL] (03/20/19 12:57:00)          BUN: 13 mg/dL [7 mg/dL - 25 mg/dL] (03/20/19 12:57:00)          Creatinine Level: 0.94 mg/dL [0.5 mg/dL - 1.1 mg/dL] (03/20/19 12:57:00)          BUN/Creat Ratio: NOT APPLICABLE [6  - 22] (03/20/19 12:57:00)          eGFR: 84 mL/min/1.73m2 (03/20/19 12:57:00)          eGFR African American: 98 mL/min/1.73m2 (03/20/19 12:57:00)          Calcium Level: 9.7 mg/dL [8.6 mg/dL - 10.2 mg/dL] (03/20/19 12:57:00)          Bilirubin Total: 0.4 mg/dL [0.2 mg/dL - 1.2 mg/dL] (03/20/19 12:57:00)          Bilirubin Direct: 0.1 mg/dL (03/20/19 12:57:00)          Bilirubin Indirect: 0.3 [0.2  - 1.2] (03/20/19 12:57:00)          Alkaline Phosphatase: 41 unit/L [33 unit/L - 115 unit/L] (03/20/19 12:57:00)          AST/SGOT: 10 unit/L [10 unit/L - 30 unit/L] (03/20/19 12:57:00)          ALT/SGPT: 10 unit/L [6 unit/L - 29 unit/L] (03/20/19 12:57:00)          Protein Total: 6.8 gm/dL [6.1 gm/dL - 8.1 gm/dL] (03/20/19 12:57:00)          Albumin Level: 4 gm/dL [3.6 gm/dL - 5.1 gm/dL] (03/20/19 12:57:00)          Globulin: 2.8 [1.9  - 3.7] (03/20/19 12:57:00)          A/G Ratio: 1.4 [1  - 2.5] (03/20/19 12:57:00)          TSH: 1.95 mIU/L (03/20/19 12:57:00)          WBC: 10.9 High [3.8  - 10.8] (03/20/19 12:57:00)          RBC: 4.27 [3.8  - 5.1] (03/20/19 12:57:00)          Hgb: 13.1 gm/dL [11.7 gm/dL - 15.5 gm/dL] (03/20/19 12:57:00)          Hct: 38.2 % [35 % - 45 %] (03/20/19 12:57:00)          MCV: 89.5 fL [80 fL - 100 fL] (03/20/19 12:57:00)          MCH: 30.7 pg [27 pg - 33 pg] (03/20/19 12:57:00)          MCHC: 34.3 gm/dL [32 gm/dL - 36 gm/dL] (03/20/19 12:57:00)          RDW: 12.4 % [11 % - 15 %] (03/20/19 12:57:00)          Platelet: 321 [140  - 400] (03/20/19 12:57:00)          MPV: 10.7 fL [7.5 fL - 12.5 fL] (03/20/19  12:57:00)          Lithium Level: 0.8 mmol/L [0.6 mmol/L - 1.2 mmol/L] (03/20/19 12:57:00)

## 2022-02-16 NOTE — PROGRESS NOTES
Chief Complaint    c/o feeling worse  History of Present Illness      23-year-old woman is following up because of her severe bipolar disorder with depression anxiety she reports she is been feeling more down little more hopeless this month she has not been suicidal.  Since she twice was able to get out of the house and go to the grocery store otherwise she is just been at home.  She is taking her medication and not miss doses  Review of Systems      No headaches, no rashes, no edema, no trouble breathing  Physical Exam   Vitals & Measurements    HR: 72(Peripheral)  BP: 124/60     HT: 68 in  WT: 280.0 lb  BMI: 42.57       Alert and oriented talkative Feerick congenial       No swelling no rashes  Assessment/Plan       Bipolar disorder (F31.9)        She does not have manic episodes she has had some sadness this month        Depression (F32.9)         As above we have talked about setting goals this month doing one thing a day today she is going to try to walk out and down the block       JENNY (generalized anxiety disorder) (F41.1)         Anxiety has not been a big component for her we will do labs including a lithium level TSH we will see her in 1 month       Orders:         ALPRAZolam, = 1 tab(s) ( 1 mg ), PO, q6-8 hrs, PRN: for anxiety, # 30 tab(s), 0 Refill(s), Type: Maintenance, Pharmacy: [facility] [id number], 1 tab(s) Oral q6-8 hrs,PRN:for anxiety, (Ordered)         ALPRAZolam, = 1 tab(s) ( 1 mg ), PO, q6-8 hrs, PRN: for anxiety, # 30 tab(s), 0 Refill(s), Type: Hard Stop, Pharmacy: [facility] [id number], (Completed)         citalopram, = 1 tab(s) ( 40 mg ), Oral, daily, # 90 tab(s), 1 Refill(s), Type: Maintenance, Pharmacy: [facility], dose change, 1 tab(s) Oral daily,x90 day(s), (Ordered)         citalopram, = 1 tab(s) ( 40 mg ), Oral, daily, # 30 tab(s), 0 Refill(s), Type: Hard Stop, Pharmacy: [facility] [address], dose change, (Completed)         tiZANidine, = 1 tab(s), Oral,  q8 hrs, PRN: AS NEEDED FOR BACK PAIN, # 30 tab(s), 0 Refill(s), Type: Hard Stop, Pharmacy: [facility] [id number], (Completed)         tiZANidine, = 1 tab(s), Oral, q8 hrs, PRN: AS NEEDED FOR BACK PAIN, # 30 tab(s), 0 Refill(s), Type: Soft Stop, Pharmacy: [facility] [id number], 1 tab(s) Oral q8 hrs,PRN:AS NEEDED FOR BACK PAIN, (Ordered)         Basic Metabolic Panel* ([facility]), Specimen Type: Serum, Collection Date: 06/18/19 11:34:00 CDT         CBC (includes diff/plt)* ([facility]), Specimen Type: Blood, Collection Date: 06/18/19 11:34:00 CDT         Lithium* ([facility]), Specimen Type: Serum, Collection Date: 06/18/19 11:34:00 CDT         Return to Clinic (Request), RFV: Med check, Return in 1 month         Return to Clinic (Request), RFV: Depression med check, Return in 1 month         TSH* ([facility]), Specimen Type: Serum, Collection Date: 06/18/19 11:34:00 CDT  Patient Information     Name:JACLYN SARMIENTO      Address:      [address] [place] [address]     Sex:Female     YOB: 1995     [phone]     Emergency Contact:EVERTON BROWN     MRN:[id number]     FIN:[id number]     Location:[facility]     Date of Service:06/18/2019      Primary Care Physician:       NONE ,       Attending Physician:       Marco A Craig MD, [phone]  Problem List/Past Medical History    Ongoing     Bipolar disorder     Depression     JENNY (generalized anxiety disorder)     History of asthma     Hypertension     Morbid obesity     Tobacco user    Historical     No qualifying data  Procedure/Surgical History     Appendectomy (2009)     Myringotomy and insertion of T tube  Medications        propranolol 60 mg oral tablet: 60 mg, 1 tab(s), Oral, tid, 270 tab(s), 0 Refill(s).        lisinopril 20 mg oral tablet: 20 mg, 1 tab(s), PO, Daily, 90 tab(s), 0 Refill(s).        Reclipsen 0.15 mg-0.03 mg oral tablet: 1 tab(s), Oral, daily, 28 tab(s), 0 Refill(s).        lithium 300 mg  oral capsule: See Instructions, 2 caps every AM, 1 cap in the afternoon, and 1 cap at night, 120 tab(s), 0 Refill(s).        SEROquel 200 mg oral tablet: 200 mg, 1 tab(s), Oral, hs, 30 tab(s), 0 Refill(s).        ALPRAZolam 1 mg oral tablet: 1 mg, 1 tab(s), PO, q6-8 hrs, PRN: for anxiety, 30 tab(s), 0 Refill(s).        tiZANidine 4 mg oral tablet: 1 tab(s), Oral, q8 hrs, PRN: AS NEEDED FOR BACK PAIN, 30 tab(s), 0 Refill(s).        citalopram 40 mg oral tablet: 40 mg, 1 tab(s), Oral, daily, for 90 day(s), 90 tab(s), 1 Refill(s).         Allergies    Metamucil (throat swelling, facial swelling)  Social History    Smoking Status - 06/18/2019     Current every day smoker     Alcohol      Current, 1-2 times per month, 02/21/2019     Exercise      Exercise frequency: Never., 02/21/2019     Nutrition/Health      Type of diet: Regular., 02/21/2019     Sexual      Sexually active: No. Identifies as female, Sexual orientation: Lesbian, morales or homosexual. Contraceptive Use Details: Birth control pill., 02/21/2019     Substance Abuse      Current, Marijuana, 02/21/2019     Tobacco      Smoker, current status unknown, Cigarettes, 10 per day., 02/21/2019  Immunizations      Vaccine Date Status      tetanus/diphth/pertuss (Tdap) adult/adol 11/16/2009 Recorded      MMR (measles/mumps/rubella) 10/18/1994 Recorded      MMR (measles/mumps/rubella) 10/18/1994 Recorded      OPV 09/27/1994 Recorded      Hep B 09/27/1994 Recorded      DTaP-Hib 09/27/1994 Recorded      OPV 04/11/1994 Recorded      DTaP-Hib 04/11/1994 Recorded      OPV 1993 Recorded      DTaP-Hib 1993 Recorded      OPV 1993 Recorded      Hep B 1993 Recorded      DTaP-Hib 1993 Recorded      Hep B 1993 Recorded  Lab Results          Lab Results (Last 4 results within 90 days)           Sodium Level: 136 mmol/L [135 mmol/L - 146 mmol/L] (06/18/19 11:43:00)          Potassium Level: 4 mmol/L [3.5 mmol/L - 5.3 mmol/L] (06/18/19 11:43:00)           Chloride Level: 106 mmol/L [98 mmol/L - 110 mmol/L] (06/18/19 11:43:00)          CO2 Level: 21 mmol/L [20 mmol/L - 32 mmol/L] (06/18/19 11:43:00)          Glucose Level: 89 mg/dL [65 mg/dL - 99 mg/dL] (06/18/19 11:43:00)          BUN: 12 mg/dL [7 mg/dL - 25 mg/dL] (06/18/19 11:43:00)          Creatinine Level: 0.96 mg/dL [0.5 mg/dL - 1.1 mg/dL] (06/18/19 11:43:00)          BUN/Creat Ratio: NOT APPLICABLE [6  - 22] (06/18/19 11:43:00)          eGFR: 82 mL/min/1.73m2 (06/18/19 11:43:00)          eGFR African American: 95 mL/min/1.73m2 (06/18/19 11:43:00)          Calcium Level: 9.3 mg/dL [8.6 mg/dL - 10.2 mg/dL] (06/18/19 11:43:00)          TSH: 4.99 mIU/L High (06/18/19 11:43:00)          WBC: 15.2 High [3.8  - 10.8] (06/18/19 11:43:00)          RBC: 4.51 [3.8  - 5.1] (06/18/19 11:43:00)          Hgb: 13.7 gm/dL [11.7 gm/dL - 15.5 gm/dL] (06/18/19 11:43:00)          Hct: 40.9 % [35 % - 45 %] (06/18/19 11:43:00)          MCV: 90.7 fL [80 fL - 100 fL] (06/18/19 11:43:00)          MCH: 30.4 pg [27 pg - 33 pg] (06/18/19 11:43:00)          MCHC: 33.5 gm/dL [32 gm/dL - 36 gm/dL] (06/18/19 11:43:00)          RDW: 12.3 % [11 % - 15 %] (06/18/19 11:43:00)          Platelet: 307 [140  - 400] (06/18/19 11:43:00)          MPV: 10 fL [7.5 fL - 12.5 fL] (06/18/19 11:43:00)          Lymphocytes: 24.8 % (06/18/19 11:43:00)          Abs Lymphocytes: 3770 [850  - 3900] (06/18/19 11:43:00)          Neutrophils: 68.1 % (06/18/19 11:43:00)          Abs Neutrophils: 58369 High [1500  - 7800] (06/18/19 11:43:00)          Monocytes: 4.5 % (06/18/19 11:43:00)          Abs Monocytes: 684 [200  - 950] (06/18/19 11:43:00)          Eosinophils: 2 % (06/18/19 11:43:00)          Abs Eosinophils: 304 [15  - 500] (06/18/19 11:43:00)          Basophils: 0.6 % (06/18/19 11:43:00)          Abs Basophils: 91 [0  - 200] (06/18/19 11:43:00)          Lithium Level: 0.6 mmol/L [0.6 mmol/L - 1.2 mmol/L] (06/18/19 11:43:00)

## 2022-02-16 NOTE — NURSING NOTE
Comprehensive Intake Entered On:  10/8/2019 11:23 AM CDT    Performed On:  10/8/2019 11:20 AM CDT by Missy Tran CMA               Summary   Chief Complaint :   f/u Medication. Leg is doing better. Still swollen but not as bad   Menstrual Status :   Menarcheal   Weight Measured :   288 lb(Converted to: 288 lb 0 oz, 130.63 kg)    Height Measured :   68 in(Converted to: 5 ft 8 in, 172.72 cm)    Body Mass Index :   43.79 kg/m2 (HI)    Body Surface Area :   2.5 m2   Systolic Blood Pressure :   122 mmHg   Diastolic Blood Pressure :   70 mmHg   Mean Arterial Pressure :   87 mmHg   Peripheral Pulse Rate :   72 bpm   Missy Tran CMA - 10/8/2019 11:20 AM CDT   Health Status   Allergies Verified? :   Yes   Medication History Verified? :   Yes   Pre-Visit Planning Status :   Completed   Tobacco Use? :   Current every day smoker   Missy Tran CMA - 10/8/2019 11:20 AM CDT   Consents   Consent for Immunization Exchange :   Consent Granted   Consent for Immunizations to Providers :   Consent Granted   Missy Tran CMA - 10/8/2019 11:20 AM CDT   Meds / Allergies   (As Of: 10/8/2019 11:23:24 AM CDT)   Allergies (Active)   Metamucil  Estimated Onset Date:   Unspecified ; Reactions:   throat swelling, facial swelling ; Created By:   Jovana Epps; Reaction Status:   Active ; Category:   Drug ; Substance:   Metamucil ; Type:   Allergy ; Severity:   Severe ; Updated By:   Jovana Epps; Reviewed Date:   9/10/2019 11:23 AM CDT        Medication List   (As Of: 10/8/2019 11:23:24 AM CDT)   Prescription/Discharge Order    ALPRAZolam  :   ALPRAZolam ; Status:   Prescribed ; Ordered As Mnemonic:   ALPRAZolam 1 mg oral tablet ; Simple Display Line:   1 mg, 1 tab(s), PO, q6-8 hrs, PRN: for anxiety, 30 tab(s), 0 Refill(s) ; Ordering Provider:   Marco A Craig MD; Catalog Code:   ALPRAZolam ; Order Dt/Tm:   9/10/2019 11:50:04 AM CDT          citalopram  :   citalopram ; Status:   Prescribed ; Ordered As  Mnemonic:   citalopram 40 mg oral tablet ; Simple Display Line:   40 mg, 1 tab(s), Oral, daily, for 90 day(s), 90 tab(s), 1 Refill(s) ; Ordering Provider:   Marco A Craig MD; Catalog Code:   citalopram ; Order Dt/Tm:   9/10/2019 11:46:13 AM CDT          desogestrel-ethinyl estradiol  :   desogestrel-ethinyl estradiol ; Status:   Prescribed ; Ordered As Mnemonic:   Reclipsen 0.15 mg-0.03 mg oral tablet ; Simple Display Line:   1 tab(s), Oral, daily, 28 tab(s), 0 Refill(s) ; Ordering Provider:   Marco A Craig MD; Catalog Code:   desogestrel-ethinyl estradiol ; Order Dt/Tm:   5/21/2019 1:17:28 PM CDT          liraglutide  :   liraglutide ; Status:   Processing ; Ordered As Mnemonic:   Saxenda 18 mg/3 mL subcutaneous solution ; Simple Display Line:   1.2 mg, Subcutaneous, daily, 15 mL, 2 Refill(s) ; Ordering Provider:   Marco A Craig MD; Action Display:   Discontinue ; Catalog Code:   liraglutide ; Order Dt/Tm:   10/8/2019 11:22:04 AM CDT          lisinopril  :   lisinopril ; Status:   Prescribed ; Ordered As Mnemonic:   lisinopril 20 mg oral tablet ; Simple Display Line:   1 tab(s), Oral, daily, 90 tab(s), 3 Refill(s) ; Ordering Provider:   Marco A Craig MD; Catalog Code:   lisinopril ; Order Dt/Tm:   8/4/2019 12:05:11 PM CDT          lithium  :   lithium ; Status:   Prescribed ; Ordered As Mnemonic:   lithium 300 mg oral capsule ; Simple Display Line:   See Instructions, TAKE 2 CAPSULES BY MOUTH EVERY MORNING, 1 CAPSULE IN THE AFTERNOON, AND 1 CAPSULE AT NIGHT, 120 cap(s), 0 Refill(s) ; Ordering Provider:   Marco A Craig MD; Catalog Code:   lithium ; Order Dt/Tm:   9/10/2019 11:46:35 AM CDT          propranolol  :   propranolol ; Status:   Prescribed ; Ordered As Mnemonic:   propranolol 60 mg oral tablet ; Simple Display Line:   1 tab(s), Oral, tid, 270 tab(s), 0 Refill(s) ; Ordering Provider:   Marco A Craig MD; Catalog Code:   propranolol ; Order Dt/Tm:   9/10/2019 11:46:44 AM CDT           QUEtiapine  :   QUEtiapine ; Status:   Prescribed ; Ordered As Mnemonic:   QUEtiapine 200 mg oral tablet ; Simple Display Line:   1 tab(s), Oral, qhs, 30 tab(s), 0 Refill(s) ; Ordering Provider:   Marco A Craig MD; Catalog Code:   QUEtiapine ; Order Dt/Tm:   9/10/2019 11:47:11 AM CDT          rivaroxaban  :   rivaroxaban ; Status:   Prescribed ; Ordered As Mnemonic:   Xarelto 15 mg oral tablet ; Simple Display Line:   15 mg, 1 tab(s), Oral, bid, for 21 day(s), proximal DVT, 42 tab(s), 0 Refill(s) ; Ordering Provider:   Marco A Craig MD; Catalog Code:   rivaroxaban ; Order Dt/Tm:   9/17/2019 4:24:54 PM CDT          tiZANidine  :   tiZANidine ; Status:   Prescribed ; Ordered As Mnemonic:   tiZANidine 4 mg oral tablet ; Simple Display Line:   1 tab(s), Oral, q8 hrs, PRN: AS NEEDED FOR BACK PAIN, 60 tab(s), 0 Refill(s) ; Ordering Provider:   Suzi Yu; Catalog Code:   tiZANidine ; Order Dt/Tm:   9/26/2019 7:49:22 PM CDT

## 2022-02-16 NOTE — NURSING NOTE
Comprehensive Intake Entered On:  8/13/2019 11:21 AM CDT    Performed On:  8/13/2019 11:19 AM CDT by Missy Tran CMA               Summary   Chief Complaint :   MEd check   Menstrual Status :   Menarcheal   Weight Measured :   287.8 lb(Converted to: 287 lb 13 oz, 130.54 kg)    Height Measured :   68 in(Converted to: 5 ft 8 in, 172.72 cm)    Body Mass Index :   43.76 kg/m2 (HI)    Body Surface Area :   2.5 m2   Systolic Blood Pressure :   122 mmHg   Diastolic Blood Pressure :   64 mmHg   Mean Arterial Pressure :   83 mmHg   Peripheral Pulse Rate :   64 bpm   Missy Tran CMA - 8/13/2019 11:19 AM CDT   Health Status   Allergies Verified? :   Yes   Medication History Verified? :   Yes   Pre-Visit Planning Status :   Completed   Tobacco Use? :   Current every day smoker   Missy Tran CMA - 8/13/2019 11:19 AM CDT   Consents   Consent for Immunization Exchange :   Consent Granted   Consent for Immunizations to Providers :   Consent Granted   Missy Tran CMA - 8/13/2019 11:19 AM CDT   Meds / Allergies   (As Of: 8/13/2019 11:21:48 AM CDT)   Allergies (Active)   Metamucil  Estimated Onset Date:   Unspecified ; Reactions:   throat swelling, facial swelling ; Created By:   Jovana Epps; Reaction Status:   Active ; Category:   Drug ; Substance:   Metamucil ; Type:   Allergy ; Severity:   Severe ; Updated By:   Jovana Epps; Reviewed Date:   2/27/2019 10:59 AM CST        Medication List   (As Of: 8/13/2019 11:21:48 AM CDT)   Prescription/Discharge Order    ALPRAZolam  :   ALPRAZolam ; Status:   Prescribed ; Ordered As Mnemonic:   ALPRAZolam 1 mg oral tablet ; Simple Display Line:   1 mg, 1 tab(s), PO, q6-8 hrs, PRN: for anxiety, 30 tab(s), 0 Refill(s) ; Ordering Provider:   Marco A Craig MD; Catalog Code:   ALPRAZolam ; Order Dt/Tm:   7/16/2019 11:29:18 AM          citalopram  :   citalopram ; Status:   Prescribed ; Ordered As Mnemonic:   citalopram 40 mg oral tablet ; Simple Display  Line:   40 mg, 1 tab(s), Oral, daily, for 90 day(s), 90 tab(s), 1 Refill(s) ; Ordering Provider:   Marco A Craig MD; Catalog Code:   citalopram ; Order Dt/Tm:   6/18/2019 11:36:57 AM          desogestrel-ethinyl estradiol  :   desogestrel-ethinyl estradiol ; Status:   Prescribed ; Ordered As Mnemonic:   Reclipsen 0.15 mg-0.03 mg oral tablet ; Simple Display Line:   1 tab(s), Oral, daily, 28 tab(s), 0 Refill(s) ; Ordering Provider:   Marco A Craig MD; Catalog Code:   desogestrel-ethinyl estradiol ; Order Dt/Tm:   5/21/2019 1:17:28 PM          liraglutide  :   liraglutide ; Status:   Prescribed ; Ordered As Mnemonic:   Saxenda 18 mg/3 mL subcutaneous solution ; Simple Display Line:   0.6 mg, Subcutaneous, daily, for 30 day(s), 15 mL, 2 Refill(s) ; Ordering Provider:   Marco A Craig MD; Catalog Code:   liraglutide ; Order Dt/Tm:   7/16/2019 11:37:32 AM          lisinopril  :   lisinopril ; Status:   Prescribed ; Ordered As Mnemonic:   lisinopril 20 mg oral tablet ; Simple Display Line:   1 tab(s), Oral, daily, 90 tab(s), 3 Refill(s) ; Ordering Provider:   Marco A Craig MD; Catalog Code:   lisinopril ; Order Dt/Tm:   8/4/2019 12:05:11 PM          lithium  :   lithium ; Status:   Prescribed ; Ordered As Mnemonic:   lithium 300 mg oral capsule ; Simple Display Line:   See Instructions, TAKE 2 CAPSULES BY MOUTH EVERY MORNING, 1 CAPSULE IN THE AFTERNOON, AND 1 CAPSULE AT NIGHT, 120 cap(s), 0 Refill(s) ; Ordering Provider:   Marco A Craig MD; Catalog Code:   lithium ; Order Dt/Tm:   8/4/2019 12:05:30 PM          propranolol  :   propranolol ; Status:   Prescribed ; Ordered As Mnemonic:   propranolol 60 mg oral tablet ; Simple Display Line:   1 tab(s), Oral, tid, 270 tab(s), 0 Refill(s) ; Ordering Provider:   Marco A Craig MD; Catalog Code:   propranolol ; Order Dt/Tm:   8/4/2019 12:05:52 PM          QUEtiapine  :   QUEtiapine ; Status:   Prescribed ; Ordered As Mnemonic:   QUEtiapine 200 mg oral  tablet ; Simple Display Line:   1 tab(s), Oral, qhs, see provider for follow up before next refill., 30 tab(s), 0 Refill(s) ; Ordering Provider:   Marco A Craig MD; Catalog Code:   QUEtiapine ; Order Dt/Tm:   8/4/2019 12:06:07 PM          tiZANidine  :   tiZANidine ; Status:   Prescribed ; Ordered As Mnemonic:   tiZANidine 4 mg oral tablet ; Simple Display Line:   1 tab(s), Oral, q8 hrs, PRN: AS NEEDED FOR BACK PAIN, 30 tab(s), 0 Refill(s) ; Ordering Provider:   Marco A Craig MD; Catalog Code:   tiZANidine ; Order Dt/Tm:   8/4/2019 12:07:09 PM

## 2022-02-16 NOTE — NURSING NOTE
Phone Message    PCP: SYDNI    Time of call: 9:43am message left    Person calling: Dilia  Contact # : [phone]    MESSAGE: Pt calls stating that she is needing medication refills for Seroquel, Lisinopril, Lithium, and Alprazolam. She says that she had one of the medication filled at a pharmacy in [place] but would like to get it at [facility] this month.    Last visit/reason: 11/14/19 - DVT    9:55am Called and spoke with patient. Informed her that she should have refills remaining on her the above medications. As far at the one that was [facility] in [place], [facility] Advised she call [facility] and just have them transfer it back to them. If she has any trouble getting the medications refilled, asked that she call back.    Seroquel 200mg 1 tab po hs #90+1, prescribed 11/14/19  Lisinopril 20mg 1 tab po daily #90+3, prescribed 8/4/19  Lithium 300mg 2 caps po am, 1 cap po afternoon, and 1 cap po at night #120+3, prescribed 11/14/19  Alprazolam 1mg 1 tab po q6-8hr prn anxiety #30+2, prescribed 11/14/19

## 2022-02-16 NOTE — NURSING NOTE
Comprehensive Intake Entered On:  2019 11:32 AM CST    Performed On:  2019 11:27 AM CST by Ramona Giron CMA               Summary   Chief Complaint :   Pt here for 1 month f/u appt for bipolar medication.   Menstrual Status :   Menarcheal   Weight Measured :   291.6 lb(Converted to: 291 lb 10 oz, 132.27 kg)    Height Measured :   68 in(Converted to: 5 ft 8 in, 172.72 cm)    Body Mass Index :   44.33 kg/m2 (HI)    Body Surface Area :   2.52 m2   Systolic Blood Pressure :   132 mmHg (HI)    Diastolic Blood Pressure :   86 mmHg (HI)    Mean Arterial Pressure :   101 mmHg   Peripheral Pulse Rate :   82 bpm   BP Site :   Right arm   BP Method :   Manual   Temperature Tympanic :   98.0 DegF(Converted to: 36.7 DegC)    Oxygen Saturation :   98 %   Ramona Giron CMA - 2019 11:27 AM CST   Health Status   Allergies Verified? :   Yes   Medication History Verified? :   Yes   Medical History Verified? :   No   Pre-Visit Planning Status :   Completed   Tobacco Use? :   Current every day smoker   Mack BARILLAS Ramona - 2019 11:27 AM CST   Demographics   Last Name :   TORSTEN   First Name :   JACLYN Gillespie Initial :   VERNA   Responsible Party Date of Birth () :   1995 CDT   Contact Relationship to Patient (other) :   Patient   Ramona Giron CMA - 2019 11:27 AM CST   Consents   Consent for Immunization Exchange :   Consent Granted   Consent for Immunizations to Providers :   Consent Granted   Mack BARILLAS Ramona - 2019 11:27 AM CST   Meds / Allergies   (As Of: 2019 11:32:53 AM CST)   Allergies (Active)   Metamucil  Estimated Onset Date:   Unspecified ; Reactions:   throat swelling, facial swelling ; Created By:   Jovana Epps; Reaction Status:   Active ; Category:   Drug ; Substance:   Metamucil ; Type:   Allergy ; Severity:   Severe ; Updated By:   Jovana Epps; Reviewed Date:   9/10/2019 11:23 AM CDT        Medication List   (As Of: 2019 11:32:53 AM CST)    Prescription/Discharge Order    ALPRAZolam  :   ALPRAZolam ; Status:   Prescribed ; Ordered As Mnemonic:   ALPRAZolam 1 mg oral tablet ; Simple Display Line:   1 mg, 1 tab(s), PO, q6-8 hrs, PRN: for anxiety, 30 tab(s), 2 Refill(s) ; Ordering Provider:   Marco A Craig MD; Catalog Code:   ALPRAZolam ; Order Dt/Tm:   10/8/2019 11:28:52 AM CDT          citalopram  :   citalopram ; Status:   Prescribed ; Ordered As Mnemonic:   citalopram 40 mg oral tablet ; Simple Display Line:   40 mg, 1 tab(s), Oral, daily, for 90 day(s), 90 tab(s), 1 Refill(s) ; Ordering Provider:   Marco A Craig MD; Catalog Code:   citalopram ; Order Dt/Tm:   9/10/2019 11:46:13 AM CDT          desogestrel-ethinyl estradiol  :   desogestrel-ethinyl estradiol ; Status:   Prescribed ; Ordered As Mnemonic:   Reclipsen 0.15 mg-0.03 mg oral tablet ; Simple Display Line:   1 tab(s), Oral, daily, 28 tab(s), 0 Refill(s) ; Ordering Provider:   Marco A rCaig MD; Catalog Code:   desogestrel-ethinyl estradiol ; Order Dt/Tm:   5/21/2019 1:17:28 PM CDT          lisinopril  :   lisinopril ; Status:   Prescribed ; Ordered As Mnemonic:   lisinopril 20 mg oral tablet ; Simple Display Line:   1 tab(s), Oral, daily, 90 tab(s), 3 Refill(s) ; Ordering Provider:   Marco A Craig MD; Catalog Code:   lisinopril ; Order Dt/Tm:   8/4/2019 12:05:11 PM CDT          lithium  :   lithium ; Status:   Prescribed ; Ordered As Mnemonic:   lithium 300 mg oral capsule ; Simple Display Line:   See Instructions, TAKE 2 CAPSULES BY MOUTH EVERY MORNING, 1 CAPSULE IN THE AFTERNOON, AND 1 CAPSULE AT NIGHT, 120 cap(s), 3 Refill(s) ; Ordering Provider:   Marco A Craig MD; Catalog Code:   lithium ; Order Dt/Tm:   10/8/2019 11:29:59 AM CDT          propranolol  :   propranolol ; Status:   Prescribed ; Ordered As Mnemonic:   propranolol 60 mg oral tablet ; Simple Display Line:   1 tab(s), Oral, tid, 270 tab(s), 0 Refill(s) ; Ordering Provider:   Marco A Craig MD; Catalog  Code:   propranolol ; Order Dt/Tm:   9/10/2019 11:46:44 AM CDT          QUEtiapine  :   QUEtiapine ; Status:   Prescribed ; Ordered As Mnemonic:   QUEtiapine 200 mg oral tablet ; Simple Display Line:   1 tab(s), Oral, qhs, for 90 day(s), 90 tab(s), 1 Refill(s) ; Ordering Provider:   Marco A Craig MD; Catalog Code:   QUEtiapine ; Order Dt/Tm:   10/8/2019 11:28:18 AM CDT          rivaroxaban  :   rivaroxaban ; Status:   Prescribed ; Ordered As Mnemonic:   Xarelto 20 mg oral tablet ; Simple Display Line:   20 mg, 1 tab(s), Oral, qpm, 30 tab(s), 5 Refill(s) ; Ordering Provider:   Marco A Craig MD; Catalog Code:   rivaroxaban ; Order Dt/Tm:   10/8/2019 11:31:32 AM CDT          tiZANidine  :   tiZANidine ; Status:   Prescribed ; Ordered As Mnemonic:   tiZANidine 4 mg oral tablet ; Simple Display Line:   1 tab(s), Oral, q8 hrs, PRN: AS NEEDED FOR BACK PAIN, 60 tab(s), 0 Refill(s) ; Ordering Provider:   Marco A Craig MD; Catalog Code:   tiZANidine ; Order Dt/Tm:   10/28/2019 6:04:35 PM CDT

## 2022-02-16 NOTE — TELEPHONE ENCOUNTER
---------------------  From: Rosalina Leslie RN (Phone Messages Pool ([id number]_[facility]))   To: [facility] Message Pool ([id number]);     Sent: 2/20/2020 4:11:24 PM CST  Subject: Tizanidine refill      Time of Call:  1532  Return call at:1600     Person Calling:  patient  Phone number:  [phone]    Note:   Patient requests a refill of her Tizanidine be sent to [facility]. She uses this for her back and it also helps with her anxiety. She takes 2 =3 tabs per Day.    Last office visit and reason:  DVT 11/14/     Prescription for Tizanidine 4mg q 8 hour PRN   last written on: 12/23/2019   Quantity: 60 Refill(s): 2---------------------  From: Missy Tran CMA ([facility] Message Pool ([id number]))   To: Marco A Craig MD;     Sent: 2/20/2020 4:14:20 PM CST  Subject: FW: Tizanidine refill  ** Submitted: **  Order:tiZANidine (tiZANidine 4 mg oral tablet)  1 tab(s)  Oral  q8 hrs  Qty:  60 tab(s)        Refills:  2          Substitutions Allowed     PRN  AS NEEDED FOR BACK PAIN      Route To Pharmacy - [id number]    Signed by Marco A Craig MD  2/20/2020 9:13:00 PM---------------------  From: Marco A Craig MD   To: [facility] Message Pool ([id number]_[place]);     Sent: 2/20/2020 9:13:28 PM CST  Subject: RE: Tizanidine refill      filled

## 2022-02-16 NOTE — TELEPHONE ENCOUNTER
"---------------------  From: Kelsea Steen RN ([employer] Messages Pool ([id number]_[facility]))   To: [facility] Message Pool ([id number]_[place]);     Sent: 6/21/2019 11:33:08 AM CDT  Subject: Assistance for transportation      pt mother ( Ashley Arriaga) called with concerns.    I returned call to Ashley (mother):  Mother concerned that patient is taking anxiety meds wrong.  When given Rx for Anxiety medication, she will take 18 within first couple of days and is \"high for days\".  Mother states patient needs to return to therapy and is not going to therapy due to unable to drive herself to therapy.    at next visit, Please refer to CC to see if they have resources to assist with transportation.---------------------  From: Missy Tran CMA ([employer] Pool ([id number]_[facility]))   To: Marco A Craig MD;     Sent: 6/25/2019 7:58:13 AM CDT  Subject: FW: Assistance for transportation      FYI  "

## 2022-02-16 NOTE — NURSING NOTE
Comprehensive Intake Entered On:  3/20/2019 12:28 PM CDT    Performed On:  3/20/2019 12:23 PM CDT by Isabella Mendez MA               Summary   Chief Complaint :   Mood fu was started on Citalopram, has not had any side effects and has not noticed any differences in mood    Menstrual Status :   Menarcheal   Weight Measured :   274 lb(Converted to: 274 lb 0 oz, 124.28 kg)    Height Measured :   68 in(Converted to: 5 ft 8 in, 172.72 cm)    Body Mass Index :   41.66 kg/m2 (HI)    Body Surface Area :   2.44 m2   Systolic Blood Pressure :   126 mmHg   Diastolic Blood Pressure :   78 mmHg   Mean Arterial Pressure :   94 mmHg   Peripheral Pulse Rate :   84 bpm   BP Site :   Right arm   Pulse Site :   Radial artery   Oxygen Saturation :   97 %   Isabella Mendez MA - 3/20/2019 12:23 PM CDT   Health Status   Allergies Verified? :   Yes   Medication History Verified? :   Yes   Medical History Verified? :   No   Pre-Visit Planning Status :   Completed   Tobacco Use? :   Current every day smoker   Isabella Mendez MA - 3/20/2019 12:23 PM CDT   Consents   Consent for Immunization Exchange :   Consent Granted   Consent for Immunizations to Providers :   Consent Granted   Isabella Mendez MA - 3/20/2019 12:23 PM CDT   Meds / Allergies   (As Of: 3/20/2019 12:28:56 PM CDT)   Allergies (Active)   Metamucil  Estimated Onset Date:   Unspecified ; Reactions:   throat swelling, facial swelling ; Created By:   Jovana Epps; Reaction Status:   Active ; Category:   Drug ; Substance:   Metamucil ; Type:   Allergy ; Severity:   Severe ; Updated By:   Jovana Epps; Reviewed Date:   2/27/2019 10:59 AM CST        Medication List   (As Of: 3/20/2019 12:28:56 PM CDT)   Prescription/Discharge Order    tiZANidine 4 mg oral tablet  :   tiZANidine 4 mg oral tablet ; Status:   Prescribed ; Ordered As Mnemonic:   tiZANidine 4 mg oral tablet ; Simple Display Line:   1 tab(s), Oral, q8 hrs, PRN: AS NEEDED FOR BACK PAIN, 30 tab(s) ;  Ordering Provider:   Marco A Craig MD; Catalog Code:   tiZANidine ; Order Dt/Tm:   3/5/2019 9:29:32 AM          ALPRAZolam  :   ALPRAZolam ; Status:   Prescribed ; Ordered As Mnemonic:   ALPRAZolam 1 mg oral tablet ; Simple Display Line:   1 mg, 1 tab(s), PO, q6-8 hrs, PRN: for anxiety, 30 tab(s), 0 Refill(s) ; Ordering Provider:   Marco A Craig MD; Catalog Code:   ALPRAZolam ; Order Dt/Tm:   2/19/2019 4:18:49 PM          lithium  :   lithium ; Status:   Prescribed ; Ordered As Mnemonic:   lithium 300 mg oral capsule ; Simple Display Line:   See Instructions, 2 caps every am and 1 cap at hs, 90 cap(s), 0 Refill(s) ; Ordering Provider:   Marco A Craig MD; Catalog Code:   lithium ; Order Dt/Tm:   2/19/2019 4:01:17 PM          lisinopril  :   lisinopril ; Status:   Prescribed ; Ordered As Mnemonic:   lisinopril 20 mg oral tablet ; Simple Display Line:   20 mg, 1 tab(s), PO, Daily, 30 tab(s), 0 Refill(s) ; Ordering Provider:   Marco A Craig MD; Catalog Code:   lisinopril ; Order Dt/Tm:   2/19/2019 4:00:48 PM          propranolol  :   propranolol ; Status:   Prescribed ; Ordered As Mnemonic:   propranolol 60 mg oral tablet ; Simple Display Line:   60 mg, 1 tab(s), Oral, tid, for 30 day(s), 90 tab(s), 0 Refill(s) ; Ordering Provider:   Marco A Craig MD; Catalog Code:   propranolol ; Order Dt/Tm:   2/19/2019 4:00:24 PM          desogestrel-ethinyl estradiol  :   desogestrel-ethinyl estradiol ; Status:   Prescribed ; Ordered As Mnemonic:   Reclipsen 0.15 mg-0.03 mg oral tablet ; Simple Display Line:   1 tab(s), Oral, daily, 28 tab(s), 3 Refill(s) ; Ordering Provider:   Marco A Craig MD; Catalog Code:   desogestrel-ethinyl estradiol ; Order Dt/Tm:   2/19/2019 3:59:50 PM          QUEtiapine  :   QUEtiapine ; Status:   Prescribed ; Ordered As Mnemonic:   SEROquel 200 mg oral tablet ; Simple Display Line:   200 mg, 1 tab(s), Oral, hs, 30 tab(s), 0 Refill(s) ; Ordering Provider:   Marco A Craig MD;  Catalog Code:   QUEtiapine ; Order Dt/Tm:   2/19/2019 3:58:40 PM          citalopram  :   citalopram ; Status:   Prescribed ; Ordered As Mnemonic:   citalopram 20 mg oral tablet ; Simple Display Line:   20 mg, 1 tab(s), PO, Daily, 30 tab(s), 0 Refill(s) ; Ordering Provider:   Marco A Craig MD; Catalog Code:   citalopram ; Order Dt/Tm:   2/19/2019 3:51:25 PM

## 2022-02-16 NOTE — TELEPHONE ENCOUNTER
---------------------From: Freda Casillas CMA (Phone Messages Pool ([id number]_[facility])) To: [facility] ([id number]_[facility]);   Sent: 12/23/2019 2:33:12 PM CSTSubject: Medication refill Phone MessagePCP:    SYDNI  Time of Call:  _1:34   Person Calling:  Ashley number:  [phone] returned call:  2:23Note:  Patient called requesting refill of tizanidine 4 mg.  #120.  Last filled 12/5/19She is in [facility] and would like the prescription sent to [facility] there.Transferred to: [facility] pool---------------------From: Missy Tran CMA ([facility] Message Pool ([id number]_[facility])) To: Marco A Craig MD;   Sent: 12/23/2019 4:00:47 PM CSTSubject: FW: Medication refill---------------------From: Marco A Craig MD To: [facility] Message Pool ([id number]_[facility]);   Sent: 12/23/2019 4:03:29 PM CSTSubject: RE: Medication refill ok for 90 daysPt called and informed this has been refilled

## 2022-02-16 NOTE — TELEPHONE ENCOUNTER
---------------------  From: Trang Del Rosario (Phone Messages Pool ([id number]_[facility]))   To: [facility] Message Pool ([id number]);     Sent: 10/28/2019 10:54:16 AM CDT  Subject: Tizanidine      Phone Message    PCP: SYDNI    Time of Call: 1023    Phone Number: [phone]      Note: Patient would like refill of tizanidine. Please Advise. [facility]0    Last office visit and reason: 10/08/19 Bipolar    Transferred to: [facility] Message Pool---------------------  From: Missy Tran CMA ([facility] Message Pool ([id number]))   To: Marco A Craig MD;     Sent: 10/28/2019 12:44:39 PM CDT  Subject: FW: Tizanidine---------------------  From: Marco A Craig MD   To: [facility] Message Pool ([id number]_[place]);     Sent: 10/28/2019 4:48:04 PM CDT  Subject: RE: Tizanidine      okPt called and informed it has been sent

## 2022-02-16 NOTE — NURSING NOTE
Comprehensive Intake Entered On:  6/18/2019 11:19 AM CDT    Performed On:  6/18/2019 11:16 AM CDT by Missy Tran CMA               Summary   Chief Complaint :   c/o feeling worse   Menstrual Status :   Menarcheal   Weight Measured :   280.0 lb(Converted to: 280 lb 0 oz, 127.01 kg)    Height Measured :   68 in(Converted to: 5 ft 8 in, 172.72 cm)    Body Mass Index :   42.57 kg/m2 (HI)    Body Surface Area :   2.47 m2   Systolic Blood Pressure :   124 mmHg   Diastolic Blood Pressure :   60 mmHg   Mean Arterial Pressure :   81 mmHg   Peripheral Pulse Rate :   72 bpm   Missy Tran CMA - 6/18/2019 11:16 AM CDT   Health Status   Allergies Verified? :   Yes   Medication History Verified? :   Yes   Pre-Visit Planning Status :   Completed   Tobacco Use? :   Current every day smoker   Missy Tran CMA - 6/18/2019 11:16 AM CDT   Consents   Consent for Immunization Exchange :   Consent Granted   Consent for Immunizations to Providers :   Consent Granted   Missy Tran CMA - 6/18/2019 11:16 AM CDT   Meds / Allergies   (As Of: 6/18/2019 11:19:46 AM CDT)   Allergies (Active)   Metamucil  Estimated Onset Date:   Unspecified ; Reactions:   throat swelling, facial swelling ; Created By:   Jovana Epps; Reaction Status:   Active ; Category:   Drug ; Substance:   Metamucil ; Type:   Allergy ; Severity:   Severe ; Updated By:   Jovana Epps; Reviewed Date:   2/27/2019 10:59 AM CST        Medication List   (As Of: 6/18/2019 11:19:46 AM CDT)   Prescription/Discharge Order    ALPRAZolam  :   ALPRAZolam ; Status:   Prescribed ; Ordered As Mnemonic:   ALPRAZolam 1 mg oral tablet ; Simple Display Line:   1 mg, 1 tab(s), PO, q6-8 hrs, PRN: for anxiety, 30 tab(s), 0 Refill(s) ; Ordering Provider:   Marco A Craig MD; Catalog Code:   ALPRAZolam ; Order Dt/Tm:   5/21/2019 1:25:56 PM          citalopram  :   citalopram ; Status:   Prescribed ; Ordered As Mnemonic:   citalopram 40 mg oral tablet ; Simple  Display Line:   40 mg, 1 tab(s), Oral, daily, 30 tab(s), 0 Refill(s) ; Ordering Provider:   Marco A Craig MD; Catalog Code:   citalopram ; Order Dt/Tm:   5/21/2019 1:17:07 PM          desogestrel-ethinyl estradiol  :   desogestrel-ethinyl estradiol ; Status:   Prescribed ; Ordered As Mnemonic:   Reclipsen 0.15 mg-0.03 mg oral tablet ; Simple Display Line:   1 tab(s), Oral, daily, 28 tab(s), 0 Refill(s) ; Ordering Provider:   Marco A Craig MD; Catalog Code:   desogestrel-ethinyl estradiol ; Order Dt/Tm:   5/21/2019 1:17:28 PM          lisinopril  :   lisinopril ; Status:   Prescribed ; Ordered As Mnemonic:   lisinopril 20 mg oral tablet ; Simple Display Line:   20 mg, 1 tab(s), PO, Daily, 90 tab(s), 0 Refill(s) ; Ordering Provider:   Marco A Craig MD; Catalog Code:   lisinopril ; Order Dt/Tm:   4/17/2019 2:09:21 PM ; Comment:   pt's insurace requires 90 day supply          lithium  :   lithium ; Status:   Prescribed ; Ordered As Mnemonic:   lithium 300 mg oral capsule ; Simple Display Line:   See Instructions, 2 caps every AM, 1 cap in the afternoon, and 1 cap at night, 120 tab(s), 0 Refill(s) ; Ordering Provider:   Marco A Craig MD; Catalog Code:   lithium ; Order Dt/Tm:   5/21/2019 1:17:54 PM          propranolol  :   propranolol ; Status:   Prescribed ; Ordered As Mnemonic:   propranolol 60 mg oral tablet ; Simple Display Line:   60 mg, 1 tab(s), Oral, tid, 270 tab(s), 0 Refill(s) ; Ordering Provider:   Marco A Craig MD; Catalog Code:   propranolol ; Order Dt/Tm:   4/17/2019 12:55:54 PM          QUEtiapine  :   QUEtiapine ; Status:   Prescribed ; Ordered As Mnemonic:   SEROquel 200 mg oral tablet ; Simple Display Line:   200 mg, 1 tab(s), Oral, hs, 30 tab(s), 0 Refill(s) ; Ordering Provider:   Marco A Craig MD; Catalog Code:   QUEtiapine ; Order Dt/Tm:   5/21/2019 1:18:22 PM          tiZANidine  :   tiZANidine ; Status:   Prescribed ; Ordered As Mnemonic:   tiZANidine 4 mg oral tablet ;  Simple Display Line:   1 tab(s), Oral, q8 hrs, PRN: AS NEEDED FOR BACK PAIN, 30 tab(s), 0 Refill(s) ; Ordering Provider:   Marco A Craig MD; Catalog Code:   tiZANidine ; Order Dt/Tm:   5/21/2019 1:18:52 PM

## 2022-02-16 NOTE — PROGRESS NOTES
Patient:   JACLYN SARMIENTO            MRN: [id number]            FIN: [id number]               Age:   23 years     Sex:  Female     :  1995   Associated Diagnoses:   Bipolar disorder; Depression; JENNY (generalized anxiety disorder); Morbid obesity   Author:   Marco A Craig MD      Chief Complaint   2019 12:20 PM CDT   Follow medications- Not noticing a difference and has been very sad      History of Present Illness   see chief complaint as noted above and confirmed with the patient   23 year old female with known JENNY, depression and bipolar disorder here to follow up on her medications,    She doesn't feel the citalopram is making much of a difference.  She does note that she has gotten out of the house a couple times . She has plans to go to a couple baseball games with her Grandma this summer because she has a cousin that plays.  She tried setting up a time to go to the [employer]. She wanted to go with a friend but her friend had to keep canceling. She is seeing a counselor and has found that helpful.      Review of Systems   Constitutional:  Negative.    Respiratory:  Negative.    Cardiovascular:  Negative.    Gastrointestinal:  Negative.    Integumentary:  Negative.    Neurologic:  Negative.    Psychiatric:  Anxiety, Depression.              Health Status   Allergies:    Allergic Reactions (Selected)  Severe  Metamucil (Throat swelling and facial swelling)   Medications:  (Selected)   Prescriptions  Prescribed  ALPRAZolam 1 mg oral tablet: = 1 tab(s) ( 1 mg ), PO, q6-8 hrs, PRN: for anxiety, # 30 tab(s), 0 Refill(s), Type: Maintenance, Pharmacy: [id number], 1 tab(s) Oral q6-8 hrs,PRN:for anxiety  Reclipsen 0.15 mg-0.03 mg oral tablet: 1 tab(s), Oral, daily, # 28 tab(s), 3 Refill(s), Type: Maintenance, Pharmacy: [facility], 1 tab(s) Oral daily  SEROquel 200 mg oral tablet: = 1 tab(s) ( 200 mg ), Oral, hs, # 30 tab(s), 0 Refill(s), Type: Maintenance, Pharmacy: [facility], 1 tab(s) Oral hs  citalopram 20 mg oral tablet: = 1 tab(s) ( 20 mg ), PO, Daily, # 30 tab(s), 0 Refill(s), Type: Maintenance, Pharmacy: [facility], 1 tab(s) Oral daily  lisinopril 20 mg oral tablet: = 1 tab(s) ( 20 mg ), PO, Daily, # 30 tab(s), 0 Refill(s), Type: Maintenance, Pharmacy: [facility], 1 tab(s) Oral daily  lithium 300 mg oral capsule: See Instructions, Instructions: 2 caps every AM, 1 cap in the afternoon, and 1 cap at night, # 120 cap(s), 0 Refill(s), Type: Maintenance, Pharmacy: [facility], 2 caps every AM, 1 cap in the afternoon, and 1 cap at night  propranolol 60 mg oral tablet: = 1 tab(s) ( 60 mg ), Oral, tid, # 90 tab(s), 0 Refill(s), Type: Maintenance, Pharmacy: [facility], 1 tab(s) Oral tid,x30 day(s)  tiZANidine 4 mg oral tablet: 1 tab(s), Oral, q8 hrs, PRN: AS NEEDED FOR BACK PAIN, # 30 tab(s), Type: Soft Stop, Pharmacy: [facility] [id number]   Problem list:    All Problems  Tobacco user / SNOMED CT 784092079 / Probable  Morbid obesity / SNOMED CT 094982154 / Probable  Hypertension / SNOMED CT 1419884431 / Confirmed  History of asthma / SNOMED CT 393731671 / Confirmed  JENNY (generalized anxiety disorder) / SNOMED CT 82266891 / Confirmed  Bipolar disorder / SNOMED CT 25479205 / Confirmed      Histories   Past Medical History:    No active or resolved past medical history items have been selected or recorded.   Family History:    No family history items have been selected or recorded.   Procedure history:    Appendectomy (148924778) in 2009 at 14 Years.  Myringotomy and insertion of T tube (229613925).   Social History:        Alcohol Assessment            Current, 1-2 times per month      Tobacco Assessment            Smoker, current status unknown, Cigarettes, 10 per day.      Substance Abuse Assessment            Current, Marijuana      Nutrition and Health Assessment            Type of diet: Regular.       Exercise and Physical Activity Assessment            Exercise frequency: Never.      Sexual Assessment            Sexually active: No.  Identifies as female, Sexual orientation: Lesbian, morales or homosexual.  Contraceptive               Use Details: Birth control pill.      Physical Examination   Vital Signs   4/17/2019 12:20 PM CDT Peripheral Pulse Rate 62 bpm    Systolic Blood Pressure 128 mmHg    Diastolic Blood Pressure 76 mmHg    Mean Arterial Pressure 93 mmHg      Measurements from flowsheet : Measurements   4/17/2019 12:20 PM CDT Height Measured - Standard 68 in    Weight Measured - Standard 275.0 lb    BSA 2.44 m2    Body Mass Index 41.81 kg/m2  HI      General:  Alert and oriented, No acute distress.    Eye:  Pupils are equal, round and reactive to light, Normal conjunctiva.    HENT:  Oral mucosa is moist.    Neck:  Supple.    Respiratory:  Respirations are non-labored.    Cardiovascular:  Normal rate, Regular rhythm, No edema.    Gastrointestinal:  Non-distended.    Musculoskeletal:  Normal gait.    Integumentary:  Warm, No rash.    Psychiatric:  Cooperative, Appropriate mood & affect, Normal judgment.       Review / Management   Results review      Impression and Plan   Diagnosis     Bipolar disorder (AZI79-NC F31.9).     Depression (CKZ85-ZX F32.9).     JENNY (generalized anxiety disorder) (LEZ68-FA F41.1).     Morbid obesity (EST21-DR E66.01).     Plan:  Discussed that this month she should find a few small things this month that she thinks she will enjoy like going to the library, get some seed or bulbs and plant them, buy a bird feeder and sit out and watch the birds. We will increase her Citalopram to 40mg daily. Will have her follow up in 1 month.  [facility], acted solely as a scribe for, and in the presence of Dr. Marco A Craig who performed the service..

## 2022-02-16 NOTE — TELEPHONE ENCOUNTER
---------------------  From: Migdalia Lange CMA (Phone Messages Pool ([id number]_[facility]))   To: [facility] Message Pool ([id number]_[place] [facility] [place]);     Sent: 8/21/2019 6:45:31 PM CDT  Subject: Saxenda update     Phone Message    PCP:   SYDNI      Time of Call:  1839       Person Calling:  pt  Phone number:  [phone], ok LM    Returned call at: _    Note:   Pt calling with update on Saxenda. Was doing well first couple of weeks while on with appetite control. Over the last couple of days she feels like it has stopped suppressing her appetite and all she wants to do is eat. Currently on 0.6mg daily. Wondering if she should be doing something different. Ok to wait for SYDNI tomorrow.     Last office visit and reason:  8/13/19 depression SYDNI---------------------  From: Missy Tran CMA ([facility] Message Pool ([id number]_[facility]))   To: Marco A Craig MD;     Sent: 8/22/2019 7:59:30 AM CDT  Subject: FW: Saxenda update---------------------  From: Marco A Craig MD   To: [facility] Message Pool ([id number]_[place] [facility] [place]);     Sent: 8/22/2019 8:59:49 AM CDT  Subject: RE: Saxenda update     increase to 1.2mg per Bonnie MARINE informing pt to increase to 1.2mg (2 tablets of 0.6mg) daily. Asked her to call me back if she is needing refills sent in due to dose or if she has any questions or concerns.I did send a updated rx to her pharmacy8/22/19 @ 5498.  Pt returns call with a question about the increased dosage. She would like a call back.    1257. Called and spoke with pt. She wanted to know if she is to use 0.6mg twice daily or just 1.2mg once daily. Informed pt to use 1.2mg once daily. Pt does not have any other questions about the injection directions.

## 2022-02-16 NOTE — TELEPHONE ENCOUNTER
---------------------  From: Sonja/Trang MURRAY (Phone Messages Pool ([id number]_[facility]))   To: [facility] Provider [facility] (32224_[facility]);     Sent: 9/26/2019 4:35:55 PM CDT  Subject: tizanidine     Phone Message    PCP: TRUE TROY until monday     Time of Call: 1610    Phone Number: [phone]      Note: Patient called stating that she needs a refill of her Tizanidine. Patient states that [place] is [place] and would like it to go to [facility] in [place] @ [address] [facility]     Pharmacy: [facility]     Last office visit and reason: 9/17/19    Transferred to: [facility]---------------------  From: Suzi Yu ([facility] Provider [place] (32224_[facility]))   To: Phone Messages [facility] (32224_[place] [facility] [place]);     Sent: 9/26/2019 7:19:49 PM CDT  Subject: RE: tizanidine     go ahead and refill #60 tabs as directedMedication sent to pharmacy requested by patient, notified patient via phone.

## 2022-02-16 NOTE — TELEPHONE ENCOUNTER
Entered by Summer Garnett on August 04, 2019 12:07:22 PM CDT  ---------------------  From: Summer Garnett   To: [facility] #[id number]    Sent: 8/4/2019 12:07:22 PM CDT  Subject: Medication Management     ** Submitted: **  Order:tiZANidine (tiZANidine 4 mg oral tablet)  1 tab(s)  Oral  q8 hrs  Qty:  30 tab(s)        Days Supply:  10        Refills:  0          Substitutions Allowed     PRN  AS NEEDED FOR BACK PAIN      Route To [facility] #[id number]    Signed by Summer Garnett  8/4/2019 12:07:00 PM    ** Submitted: **  Complete:tiZANidine (tiZANidine 4 mg oral tablet)   Signed by Summer Garnett  8/4/2019 12:07:00 PM    ** Not Approved:  **  tiZANidine (TIZANIDINE 4MG TABLETS)  TAKE 1 TABLET BY MOUTH EVERY 8 HOURS AS NEEDED FOR BACK PAIN  Qty:  30 tab(s)        Days Supply:  10        Refills:  0          Substitutions Allowed     Route To [facility] #[id number]   Signed by Summer Garnett            ** Submitted: **  Order:QUEtiapine (QUEtiapine 200 mg oral tablet)  1 tab(s)  Oral  qhs  see provider for follow up before next refill.  Qty:  30 tab(s)        Days Supply:  30        Refills:  0          Substitutions Allowed     Route To [facility] #[id number]    Signed by Summer Garnett  8/4/2019 12:06:00 PM    ** Submitted: **  Complete:QUEtiapine (QUEtiapine 200 mg oral tablet)   Signed by Summer Garnett  8/4/2019 12:07:00 PM    ** Submitted: **  Complete:QUEtiapine (SEROquel 200 mg oral tablet)   Signed by Summer Garnett  8/4/2019 12:07:00 PM    ** Not Approved:  **  QUEtiapine (QUETIAPINE 200MG TABLETS)  TAKE 1 TABLET BY MOUTH AT BEDTIME  Qty:  30 tab(s)        Days Supply:  30        Refills:  0          Substitutions Allowed     Route To [facility] #[id number]   Signed by Summer Garnett            ** Submitted: **  Order:propranolol (propranolol 60 mg oral tablet)  1 tab(s)  Oral  tid  Qty:  270 tab(s)        Days  Supply:  90        Refills:  0          Substitutions Allowed     Route To [facility] #[id number]    Signed by Summer Garnett  8/4/2019 12:05:00 PM    ** Submitted: **  Complete:propranolol (propranolol 60 mg oral tablet)   Signed by Summer Garnett  8/4/2019 12:06:00 PM    ** Not Approved:  **  propranolol (PROPRANOLOL 60MG TABLETS)  TAKE 1 TABLET BY MOUTH THREE TIMES DAILY  Qty:  270 tab(s)        Days Supply:  90        Refills:  0          Substitutions Allowed     Route To [facility] #[id number]   Signed by Summer Garnett            ** Submitted: **  Order:lithium (lithium 300 mg oral capsule)  See Instructions  TAKE 2 CAPSULES BY MOUTH EVERY MORNING, 1 CAPSULE IN THE AFTERNOON, AND 1 CAPSULE AT NIGHT  Qty:  120 cap(s)        Days Supply:  30        Refills:  0          Substitutions Allowed     Route To [facility] #[id number]    Signed by Summer Garnett  8/4/2019 12:05:00 PM    ** Submitted: **  Complete:lithium (lithium 300 mg oral capsule)   Signed by Summer Garnett  8/4/2019 12:05:00 PM    ** Submitted: **  Complete:lithium (lithium 300 mg oral capsule)   Signed by Summer Garnett  8/4/2019 12:05:00 PM    ** Not Approved:  **  lithium (LITHIUM CARBONATE 300MG CAPSULES)  TAKE 2 CAPSULES BY MOUTH EVERY MORNING, 1 CAPSULE IN THE AFTERNOON, AND 1 CAPSULE AT NIGHT  Qty:  120 cap(s)        Days Supply:  30        Refills:  0          Substitutions Allowed     Route To [facility] #[id number]   Signed by Summer Garnett            ** Submitted: **  Order:lisinopril (lisinopril 20 mg oral tablet)  1 tab(s)  Oral  daily  Qty:  90 tab(s)        Days Supply:  90        Refills:  3          Substitutions Allowed     Route To [facility] #[id number]    Signed by Summer Garnett  8/4/2019 12:05:00 PM    ** Submitted: **  Complete:lisinopril (lisinopril 20 mg oral tablet)  pt's insurace requires 90 day supply       Signed by  Summer Garnett  8/4/2019 12:05:00 PM    ** Not Approved:  **  lisinopril (LISINOPRIL 20MG TABLETS)  TAKE 1 TABLET BY MOUTH DAILY  Qty:  90 tab(s)        Days Supply:  90        Refills:  0          Substitutions Allowed     Route To Pharmacy - [facility] #[id number]   Signed by Summer Garnett          Date of last office visit and reason:  7/16/19 med check      Date of last Med Check / Px:   ^  Date of last labs pertaining to med:  last BMP for lisinopril was 6/18/19    RTC order in chart:  yes, due 8/16/19 fpr med f/u    Per protocol will fill lisinopril x1 year and other meds x1 month and note to see provider for further refills.    For Protocol refill, has patient been contacted:  _        ------------------------------------------  From: [facility] #[id number]  To: Marco A Craig MD  Sent: August 1, 2019 11:12:11 PM CDT  Subject: Medication Management  Due: August 2, 2019 11:12:11 PM CDT    ** On Hold Pending Signature **  Drug: lithium (lithium 300 mg oral capsule)  2 CAPS EVERY AM, 1 CAP IN THE AFTERNOON, AND 1 CAP AT NIGHT  Quantity: 120 tab(s)    Days Supply: 0         Refills: 0  Substitutions Allowed  Notes from Pharmacy:     Dispensed Drug: lithium (lithium 300 mg oral capsule)  TAKE 2 CAPSULES BY MOUTH EVERY MORNING, 1 CAPSULE IN THE AFTERNOON, AND 1 CAPSULE AT NIGHT  Quantity: 120 cap(s)    Days Supply: 30        Refills: 0  Substitutions Allowed  Notes from Pharmacy:     ** On Hold Pending Signature **  Drug: QUEtiapine (SEROquel 200 mg oral tablet)  1 TAB(S) ORAL HS  Quantity: 30 unknown unit  Days Supply: 0         Refills: 0  Substitutions Allowed  Notes from Pharmacy:     Dispensed Drug: QUEtiapine (QUEtiapine 200 mg oral tablet)  TAKE 1 TABLET BY MOUTH AT BEDTIME  Quantity: 30 tab(s)     Days Supply: 30        Refills: 0  Substitutions Allowed  Notes from Pharmacy:     ** On Hold Pending Signature **  Drug: tiZANidine (tiZANidine 4 mg oral tablet)  1 TAB(S) ORAL Q8 HRS,PRN:AS  NEEDED FOR BACK PAIN  Quantity: 30 tab(s)     Days Supply: 0         Refills: 0  Substitutions Allowed  Notes from Pharmacy:     Dispensed Drug: tiZANidine (tiZANidine 4 mg oral tablet)  TAKE 1 TABLET BY MOUTH EVERY 8 HOURS AS NEEDED FOR BACK PAIN  Quantity: 30 tab(s)     Days Supply: 10        Refills: 0  Substitutions Allowed  Notes from Pharmacy:     ** On Hold Pending Signature **  Drug: propranolol (propranolol 60 mg oral tablet)  1 TAB(S) ORAL TID  Quantity: 270 tab(s)    Days Supply: 0         Refills: 0  Substitutions Allowed  Notes from Pharmacy:     Dispensed Drug: propranolol (propranolol 60 mg oral tablet)  TAKE 1 TABLET BY MOUTH THREE TIMES DAILY  Quantity: 270 tab(s)    Days Supply: 90        Refills: 0  Substitutions Allowed  Notes from Pharmacy:     ** On Hold Pending Signature **  Drug: lisinopril (lisinopril 20 mg oral tablet)  1 TAB(S) ORAL DAILY  Quantity: 90 tab(s)     Days Supply: 0         Refills: 0  Substitutions Allowed  Notes from Pharmacy:     Dispensed Drug: lisinopril (lisinopril 20 mg oral tablet)  TAKE 1 TABLET BY MOUTH DAILY  Quantity: 90 tab(s)     Days Supply: 90        Refills: 0  Substitutions Allowed  Notes from Pharmacy:   ------------------------------------------

## 2022-02-16 NOTE — PROGRESS NOTES
Chief Complaint    Med check  History of Present Illness      23-year-old here following up on her bipolar disorder generalized anxiety.       She feels like this is not better month.  She is been very active trying to cook meals and she did even take a walk around the block got out of that this a few times.  She says it was scary but she is glad she did it.  She is talking to her brother about going with him some events where there is even some crowds       I discussed with her we had to phone calls from person who identifies her self his family member concerned about how she is taking her medicines discussed that we are unable to do anything to take information and therefore real concern will call the police for a check but patient says that she has been doing better       Patient says she feels better and wants to take care of some issues specifically she is worried about her weight loss says she is always been having she has lost 40 pounds before despite not eating well she wonders if there is something she can do to help her weakness and be more active  Review of Systems      See HPI.  All other review of systems negative.  Physical Exam   Vitals & Measurements    HR: 72(Peripheral)  BP: 122/74     HT: 68 in  WT: 285 lb  BMI: 43.33       Alert talkative no signs of distress normal cognition normal speech patterns no tremor normal gait no facial tics or grimaces  Assessment/Plan       Bipolar disorder (F31.9)         Next month we will do a lithium level but she will continue on the medication for now along with her Seroquel       Depression (F32.9)         Discussed the need for regular activity and she will continue on citalopram         Discussed how her condition does tend to lead to increased weight she needs to try to do even if it is necessary home stretching and we can start her on Saxenda with follow-up in a month.  She would benefit from talking to the nutritionist that may be psychologically difficult  for her to do I encouraged her to try  Patient Information     Name:JACLYN SARMIENTO      Address:      [address] [place] [id number]     Sex:Female     YOB: 1995     [phone]     Emergency Contact:EVERTON BROWN     MRN:[id number]     FIN:[id number]     Location:[facility]     Date of Service:07/16/2019      Primary Care Physician:       NONE ,       Attending Physician:       Marco A Craig MD, [phone]  Problem List/Past Medical History    Ongoing     Bipolar disorder     Depression     JENNY (generalized anxiety disorder)     History of asthma     Hypertension     Morbid obesity     Tobacco user    Historical     No qualifying data  Procedure/Surgical History     Appendectomy (2009)     Myringotomy and insertion of T tube  Medications        propranolol 60 mg oral tablet: 60 mg, 1 tab(s), Oral, tid, 270 tab(s), 0 Refill(s).        lisinopril 20 mg oral tablet: 20 mg, 1 tab(s), PO, Daily, 90 tab(s), 0 Refill(s).        Reclipsen 0.15 mg-0.03 mg oral tablet: 1 tab(s), Oral, daily, 28 tab(s), 0 Refill(s).        lithium 300 mg oral capsule: See Instructions, 2 caps every AM, 1 cap in the afternoon, and 1 cap at night, 120 tab(s), 0 Refill(s).        SEROquel 200 mg oral tablet: 200 mg, 1 tab(s), Oral, hs, 30 tab(s), 0 Refill(s).        citalopram 40 mg oral tablet: 40 mg, 1 tab(s), Oral, daily, for 90 day(s), 90 tab(s), 1 Refill(s).        lithium 300 mg oral capsule: See Instructions, TAKE 2 CAPSULES BY MOUTH EVERY MORNING, 1 CAPSULE IN THE AFTERNOON, AND 1 CAPSULE AT NIGHT, 120 cap(s).        QUEtiapine 200 mg oral tablet: See Instructions, TAKE 1 TABLET BY MOUTH AT BEDTIME, 30 tab(s).        tiZANidine 4 mg oral tablet: 1 tab(s), Oral, q8 hrs, PRN: AS NEEDED FOR BACK PAIN, 30 tab(s), 0 Refill(s).        ALPRAZolam 1 mg oral tablet: 1 mg, 1 tab(s), PO, q6-8 hrs, PRN: for anxiety, 30 tab(s), 0 Refill(s).        Saxenda 18 mg/3 mL subcutaneous  solution: 0.6 mg, Subcutaneous, daily, for 30 day(s), 15 mL, 2 Refill(s).         Allergies    Metamucil (throat swelling, facial swelling)  Social History    Smoking Status - 07/16/2019     Current every day smoker     Alcohol      Current, 1-2 times per month, 02/21/2019     Exercise      Exercise frequency: Never., 02/21/2019     Nutrition/Health      Type of diet: Regular., 02/21/2019     Sexual      Sexually active: No. Identifies as female, Sexual orientation: Lesbian, morales or homosexual. Contraceptive Use Details: Birth control pill., 02/21/2019     Substance Abuse      Current, Marijuana, 02/21/2019     Tobacco      Smoker, current status unknown, Cigarettes, 10 per day., 02/21/2019  Immunizations      Vaccine Date Status      tetanus/diphth/pertuss (Tdap) adult/adol 11/16/2009 Recorded      MMR (measles/mumps/rubella) 10/18/1994 Recorded      MMR (measles/mumps/rubella) 10/18/1994 Recorded      OPV 09/27/1994 Recorded      Hep B 09/27/1994 Recorded      DTaP-Hib 09/27/1994 Recorded      OPV 04/11/1994 Recorded      DTaP-Hib 04/11/1994 Recorded      OPV 1993 Recorded      DTaP-Hib 1993 Recorded      OPV 1993 Recorded      Hep B 1993 Recorded      DTaP-Hib 1993 Recorded      Hep B 1993 Recorded  Lab Results          Lab Results (Last 4 results within 90 days)           Sodium Level: 136 mmol/L [135 mmol/L - 146 mmol/L] (06/18/19 11:43:00)          Potassium Level: 4 mmol/L [3.5 mmol/L - 5.3 mmol/L] (06/18/19 11:43:00)          Chloride Level: 106 mmol/L [98 mmol/L - 110 mmol/L] (06/18/19 11:43:00)          CO2 Level: 21 mmol/L [20 mmol/L - 32 mmol/L] (06/18/19 11:43:00)          Glucose Level: 89 mg/dL [65 mg/dL - 99 mg/dL] (06/18/19 11:43:00)          BUN: 12 mg/dL [7 mg/dL - 25 mg/dL] (06/18/19 11:43:00)          Creatinine Level: 0.96 mg/dL [0.5 mg/dL - 1.1 mg/dL] (06/18/19 11:43:00)          BUN/Creat Ratio: NOT APPLICABLE [6  - 22] (06/18/19 11:43:00)          eGFR: 82  mL/min/1.73m2 (06/18/19 11:43:00)          eGFR African American: 95 mL/min/1.73m2 (06/18/19 11:43:00)          Calcium Level: 9.3 mg/dL [8.6 mg/dL - 10.2 mg/dL] (06/18/19 11:43:00)          TSH: 4.99 mIU/L High (06/18/19 11:43:00)          WBC: 15.2 High [3.8  - 10.8] (06/18/19 11:43:00)          RBC: 4.51 [3.8  - 5.1] (06/18/19 11:43:00)          Hgb: 13.7 gm/dL [11.7 gm/dL - 15.5 gm/dL] (06/18/19 11:43:00)          Hct: 40.9 % [35 % - 45 %] (06/18/19 11:43:00)          MCV: 90.7 fL [80 fL - 100 fL] (06/18/19 11:43:00)          MCH: 30.4 pg [27 pg - 33 pg] (06/18/19 11:43:00)          MCHC: 33.5 gm/dL [32 gm/dL - 36 gm/dL] (06/18/19 11:43:00)          RDW: 12.3 % [11 % - 15 %] (06/18/19 11:43:00)          Platelet: 307 [140  - 400] (06/18/19 11:43:00)          MPV: 10 fL [7.5 fL - 12.5 fL] (06/18/19 11:43:00)          Lymphocytes: 24.8 % (06/18/19 11:43:00)          Abs Lymphocytes: 3770 [850  - 3900] (06/18/19 11:43:00)          Neutrophils: 68.1 % (06/18/19 11:43:00)          Abs Neutrophils: 92544 High [1500  - 7800] (06/18/19 11:43:00)          Monocytes: 4.5 % (06/18/19 11:43:00)          Abs Monocytes: 684 [200  - 950] (06/18/19 11:43:00)          Eosinophils: 2 % (06/18/19 11:43:00)          Abs Eosinophils: 304 [15  - 500] (06/18/19 11:43:00)          Basophils: 0.6 % (06/18/19 11:43:00)          Abs Basophils: 91 [0  - 200] (06/18/19 11:43:00)          Lithium Level: 0.6 mmol/L [0.6 mmol/L - 1.2 mmol/L] (06/18/19 11:43:00)

## 2022-02-16 NOTE — LETTER
(Inserted Image. Unable to display)   June 19, 2019        JACLYN SARMIENTO      [address], [place] [id number]        Dear JACLYN,    Thank you for choosing [facility] for your healthcare needs. Below you will find the results of your recent test(s) done at our clinic.      Your thyroid is just a little bit high.  Usually a person does not feel tired until the level is up to 15 or 20.  I would recheck in 2 months to make sure it is not trending upward.   Your other labs are all OK      Result Name Current Result Previous Result Reference Range   Sodium Level (mmol/L)  136 6/18/2019  139 3/20/2019 135 - 146   Potassium Level (mmol/L)  4.0 6/18/2019  4.4 3/20/2019 3.5 - 5.3   Chloride Level (mmol/L)  106 6/18/2019  108 3/20/2019 98 - 110   CO2 Level (mmol/L)  21 6/18/2019  23 3/20/2019 20 - 32   Glucose Level (mg/dL)  89 6/18/2019  86 3/20/2019 65 - 99   BUN (mg/dL)  12 6/18/2019  13 3/20/2019 7 - 25   Creatinine Level (mg/dL)  0.96 6/18/2019  0.94 3/20/2019 0.50 - 1.10   eGFR (mL/min/1.73m2)  82 6/18/2019  84 3/20/2019 > OR = 60 -    eGFR  (mL/min/1.73m2)  95 6/18/2019  98 3/20/2019 > OR = 60 -    Calcium Level (mg/dL)  9.3 6/18/2019  9.7 3/20/2019 8.6 - 10.2   TSH (mIU/L) ((H)) 4.99 6/18/2019  1.95 3/20/2019    RBC  4.51 6/18/2019  4.27 3/20/2019 3.80 - 5.10   Hgb (gm/dL)  13.7 6/18/2019  13.1 3/20/2019 11.7 - 15.5   Hct (%)  40.9 6/18/2019  38.2 3/20/2019 35.0 - 45.0   MCV (fL)  90.7 6/18/2019  89.5 3/20/2019 80.0 - 100.0   MCH (pg)  30.4 6/18/2019  30.7 3/20/2019 27.0 - 33.0   MCHC (gm/dL)  33.5 6/18/2019  34.3 3/20/2019 32.0 - 36.0   RDW (%)  12.3 6/18/2019  12.4 3/20/2019 11.0 - 15.0   Platelet  307 6/18/2019  321 3/20/2019 140 - 400   MPV (fL)  10.0 6/18/2019  10.7 3/20/2019 7.5 - 12.5   Lithium Level (mmol/L)  0.6 6/18/2019  0.8 3/20/2019 0.6 - 1.2       Please contact me or my assistant at [phone] if you have any questions or concerns.      Sincerely,        Marco A Craig MD        What do your labs mean?  Below is a glossary of commonly ordered labs:  LDL   Bad Cholesterol   HDL   Good Cholesterol  AST/ALT   Liver Function   Cr/Creatinine   Kidney Function  Microalbumin   Kidney Function  BUN   Kidney Function  PSA   Prostate    TSH   Thyroid Hormone  HgbA1c   Diabetes Test   Hgb (Hemoglobin)   Red Blood Cells

## 2022-02-16 NOTE — NURSING NOTE
Comprehensive Intake Entered On:  9/17/2019 3:16 PM CDT    Performed On:  9/17/2019 3:12 PM CDT by Missy Tran CMA               Summary   Chief Complaint :   c/o right leg swelling, struggling to walk   Menstrual Status :   Menarcheal   Weight Measured :   283.6 lb(Converted to: 283 lb 10 oz, 128.64 kg)    Height Measured :   68 in(Converted to: 5 ft 8 in, 172.72 cm)    Body Mass Index :   43.12 kg/m2 (HI)    Body Surface Area :   2.48 m2   Systolic Blood Pressure :   124 mmHg   Diastolic Blood Pressure :   72 mmHg   Mean Arterial Pressure :   89 mmHg   Peripheral Pulse Rate :   86 bpm   Oxygen Saturation :   97 %   Missy Tran CMA - 9/17/2019 3:12 PM CDT   Health Status   Allergies Verified? :   Yes   Medication History Verified? :   Yes   Pre-Visit Planning Status :   Completed   Missy Tran CMA - 9/17/2019 3:12 PM CDT   Consents   Consent for Immunization Exchange :   Consent Granted   Consent for Immunizations to Providers :   Consent Granted   Missy Tran CMA - 9/17/2019 3:12 PM CDT   Problems   (As Of: 9/17/2019 3:16:43 PM CDT)   Problems(Active)    Bipolar disorder (SNOMED CT  :11165828 )  Name of Problem:   Bipolar disorder ; Recorder:   Jovana Epps; Confirmation:   Confirmed ; Classification:   Medical ; Code:   [id number] ; Contributor System:   [email or web address] ; Last Updated:   2/27/2019 11:03 AM CST ; Life Cycle Date:   2/27/2019 ; Life Cycle Status:   Active ; Vocabulary:   SNOMED CT        Depression (SNOMED CT  :32158288 )  Name of Problem:   Depression ; Recorder:   Missy Tran CMA; Confirmation:   Confirmed ; Classification:   Medical ; Code:   70891128 ; Contributor System:   [employer] ; Last Updated:   4/17/2019 12:39 PM CDT ; Life Cycle Status:   Active ; Vocabulary:   SNOMED CT        JENNY (generalized anxiety disorder) (SNOMED CT  :54760852 )  Name of Problem:   JENNY (generalized anxiety disorder) ; Recorder:   Jovana Epps; Confirmation:   Confirmed ;  Classification:   Medical ; Code:   [id number] ; Contributor System:   [email or web address] ; Last Updated:   2/27/2019 11:03 AM CST ; Life Cycle Date:   2/27/2019 ; Life Cycle Status:   Active ; Vocabulary:   SNOMED CT        History of asthma (SNOMED CT  :979058309 )  Name of Problem:   History of asthma ; Recorder:   Jovana Epps; Confirmation:   Confirmed ; Classification:   Medical ; Code:   256940541 ; Contributor System:   [email or web address] ; Last Updated:   2/27/2019 11:04 AM CST ; Life Cycle Date:   2/27/2019 ; Life Cycle Status:   Active ; Vocabulary:   SNOMED CT        Hypertension (SNOMED CT  :5544326723 )  Name of Problem:   Hypertension ; Recorder:   Isabella Mendez MA; Confirmation:   Confirmed ; Classification:   Medical ; Code:   [id number] ; Contributor System:   [email or web address] ; Last Updated:   3/21/2019 2:50 PM CDT ; Life Cycle Date:   3/21/2019 ; Life Cycle Status:   Active ; Vocabulary:   SNOMED CT        Morbid obesity (SNOMED CT  :423272992 )  Name of Problem:   Morbid obesity ; Recorder:   SYSTEM; Confirmation:   Probable ; Classification:   Medical ; Code:   486517637 ; Last Updated:   2/19/2019 3:21 PM CST ; Life Cycle Date:   2/19/2019 ; Life Cycle Status:   Active ; Vocabulary:   SNOMED CT        Tobacco user (SNOMED CT  :265726313 )  Name of Problem:   Tobacco user ; Recorder:   SYSTEM; Confirmation:   Probable ; Classification:   Medical ; Code:   723823733 ; Last Updated:   2/21/2019 8:01 AM CST ; Life Cycle Date:   2/21/2019 ; Life Cycle Status:   Active ; Vocabulary:   SNOMED CT          Meds / Allergies   (As Of: 9/17/2019 3:16:44 PM CDT)   Allergies (Active)   Metamucil  Estimated Onset Date:   Unspecified ; Reactions:   throat swelling, facial swelling ; Created By:   Jovana Epps; Reaction Status:   Active ; Category:   Drug ; Substance:   Metamucil ; Type:   Allergy ; Severity:   Severe ; Updated By:   Jovana Epps; Reviewed Date:   9/10/2019 11:23 AM CDT        Medication List   (As Of:  9/17/2019 3:16:44 PM CDT)   Prescription/Discharge Order    ALPRAZolam  :   ALPRAZolam ; Status:   Prescribed ; Ordered As Mnemonic:   ALPRAZolam 1 mg oral tablet ; Simple Display Line:   1 mg, 1 tab(s), PO, q6-8 hrs, PRN: for anxiety, 30 tab(s), 0 Refill(s) ; Ordering Provider:   Marco A Craig MD; Catalog Code:   ALPRAZolam ; Order Dt/Tm:   9/10/2019 11:50:04 AM          citalopram  :   citalopram ; Status:   Prescribed ; Ordered As Mnemonic:   citalopram 40 mg oral tablet ; Simple Display Line:   40 mg, 1 tab(s), Oral, daily, for 90 day(s), 90 tab(s), 1 Refill(s) ; Ordering Provider:   Marco A Craig MD; Catalog Code:   citalopram ; Order Dt/Tm:   9/10/2019 11:46:13 AM          desogestrel-ethinyl estradiol  :   desogestrel-ethinyl estradiol ; Status:   Prescribed ; Ordered As Mnemonic:   Reclipsen 0.15 mg-0.03 mg oral tablet ; Simple Display Line:   1 tab(s), Oral, daily, 28 tab(s), 0 Refill(s) ; Ordering Provider:   Marco A Craig MD; Catalog Code:   desogestrel-ethinyl estradiol ; Order Dt/Tm:   5/21/2019 1:17:28 PM          liraglutide  :   liraglutide ; Status:   Prescribed ; Ordered As Mnemonic:   Saxenda 18 mg/3 mL subcutaneous solution ; Simple Display Line:   1.2 mg, Subcutaneous, daily, 15 mL, 2 Refill(s) ; Ordering Provider:   Marco A Craig MD; Catalog Code:   liraglutide ; Order Dt/Tm:   8/22/2019 9:49:41 AM          lisinopril  :   lisinopril ; Status:   Prescribed ; Ordered As Mnemonic:   lisinopril 20 mg oral tablet ; Simple Display Line:   1 tab(s), Oral, daily, 90 tab(s), 3 Refill(s) ; Ordering Provider:   Marco A Craig MD; Catalog Code:   lisinopril ; Order Dt/Tm:   8/4/2019 12:05:11 PM          lithium  :   lithium ; Status:   Prescribed ; Ordered As Mnemonic:   lithium 300 mg oral capsule ; Simple Display Line:   See Instructions, TAKE 2 CAPSULES BY MOUTH EVERY MORNING, 1 CAPSULE IN THE AFTERNOON, AND 1 CAPSULE AT NIGHT, 120 cap(s), 0 Refill(s) ; Ordering Provider:   Rafa  Marco A HAM; Catalog Code:   lithium ; Order Dt/Tm:   9/10/2019 11:46:35 AM          propranolol  :   propranolol ; Status:   Prescribed ; Ordered As Mnemonic:   propranolol 60 mg oral tablet ; Simple Display Line:   1 tab(s), Oral, tid, 270 tab(s), 0 Refill(s) ; Ordering Provider:   Marco A Craig MD; Catalog Code:   propranolol ; Order Dt/Tm:   9/10/2019 11:46:44 AM          QUEtiapine  :   QUEtiapine ; Status:   Prescribed ; Ordered As Mnemonic:   QUEtiapine 200 mg oral tablet ; Simple Display Line:   1 tab(s), Oral, qhs, 30 tab(s), 0 Refill(s) ; Ordering Provider:   Marco A Craig MD; Catalog Code:   QUEtiapine ; Order Dt/Tm:   9/10/2019 11:47:11 AM          tiZANidine  :   tiZANidine ; Status:   Prescribed ; Ordered As Mnemonic:   tiZANidine 4 mg oral tablet ; Simple Display Line:   1 tab(s), Oral, q8 hrs, PRN: AS NEEDED FOR BACK PAIN, 60 tab(s), 0 Refill(s) ; Ordering Provider:   Marco A Craig MD; Catalog Code:   tiZANidine ; Order Dt/Tm:   9/10/2019 11:47:35 AM

## 2022-02-16 NOTE — PROGRESS NOTES
Patient:   JACLYN SARMIENTO            MRN: [id number]            FIN: [id number]               Age:   24 years     Sex:  Female     :  1995   Associated Diagnoses:   Bipolar disorder; Depression; JENNY (generalized anxiety disorder); Morbid obesity   Author:   Marco A Craig MD      Chief Complaint   2019 11:19 AM CDT   MEd check        History of Present Illness   see chief complaint as noted above and confirmed with the patient   23 year old female with known JENNY, depression and bipolar disorder here to follow up on her medications,  this was a good month,   she attended a wedding.   she felt overwhelmed at times but did ok  she has had a good visit with her mom      Review of Systems   Constitutional:  Negative.    Respiratory:  Negative.    Cardiovascular:  Negative.    Gastrointestinal:  Negative.    Hematology/Lymphatics:  No bruising tendency.    Endocrine:  No excessive thirst.    Musculoskeletal:  she awoke and fell one night when walking to kitchen.  she is not sure she truly woke up.   no recurrende.    Integumentary:  Negative.    Neurologic:  No abnormal balance, No confusion.    Psychiatric:  Anxiety, Depression.              Health Status   Allergies:    Allergic Reactions (Selected)  Severe  Metamucil (Throat swelling and facial swelling)   Medications:  (Selected)   Prescriptions  Prescribed  ALPRAZolam 1 mg oral tablet: = 1 tab(s) ( 1 mg ), PO, q6-8 hrs, PRN: for anxiety, # 30 tab(s), 0 Refill(s), Type: Maintenance, Pharmacy: [facility] #[id number], 1 tab(s) Oral q6-8 hrs,PRN:for anxiety  QUEtiapine 200 mg oral tablet: = 1 tab(s), Oral, qhs, Instructions: see provider for follow up before next refill., # 30 tab(s), 0 Refill(s), Type: Maintenance, Pharmacy: [facility] #[id number]  Reclipsen 0.15 mg-0.03 mg oral tablet: 1 tab(s), Oral, daily, # 28 tab(s), 0 Refill(s), Type: Maintenance, Pharmacy: [facility] [id number], 1 tab(s) Oral daily  Saxenda 18 mg/3 mL subcutaneous  solution: ( 0.6 mg ), Subcutaneous, daily, # 15 mL, 2 Refill(s), Type: Maintenance, Pharmacy: [id number], 0.6 mg Subcutaneous daily,x30 day(s)  citalopram 40 mg oral tablet: = 1 tab(s) ( 40 mg ), Oral, daily, # 90 tab(s), 1 Refill(s), Type: Maintenance, Pharmacy: [facility] [id number], dose change, 1 tab(s) Oral daily,x90 day(s)  lisinopril 20 mg oral tablet: = 1 tab(s), Oral, daily, # 90 tab(s), 3 Refill(s), Type: Maintenance, Pharmacy: [facility] #[id number]  lithium 300 mg oral capsule: See Instructions, Instructions: TAKE 2 CAPSULES BY MOUTH EVERY MORNING, 1 CAPSULE IN THE AFTERNOON, AND 1 CAPSULE AT NIGHT, # 120 cap(s), 0 Refill(s), Type: Maintenance, Pharmacy: [facility] #[id number]  propranolol 60 mg oral tablet: = 1 tab(s), Oral, tid, # 270 tab(s), 0 Refill(s), Type: Maintenance, Pharmacy: [facility] #[id number]  tiZANidine 4 mg oral tablet: = 1 tab(s), Oral, q8 hrs, PRN: AS NEEDED FOR BACK PAIN, # 60 tab(s), 0 Refill(s), Type: Maintenance, Pharmacy: [facility] #[id number], 1 tab(s) Oral q8 hrs,PRN:AS NEEDED FOR BACK PAIN,    Medications          *denotes recorded medication          ALPRAZolam 1 mg oral tablet: 1 mg, 1 tab(s), PO, q6-8 hrs, PRN: for anxiety, 30 tab(s), 0 Refill(s).          QUEtiapine 200 mg oral tablet: 1 tab(s), Oral, qhs, see provider for follow up before next refill., 30 tab(s), 0 Refill(s).          citalopram 40 mg oral tablet: 40 mg, 1 tab(s), Oral, daily, for 90 day(s), 90 tab(s), 1 Refill(s).          Reclipsen 0.15 mg-0.03 mg oral tablet: 1 tab(s), Oral, daily, 28 tab(s), 0 Refill(s).          Saxenda 18 mg/3 mL subcutaneous solution: 0.6 mg, Subcutaneous, daily, for 30 day(s), 15 mL, 2 Refill(s).          lisinopril 20 mg oral tablet: 1 tab(s), Oral, daily, 90 tab(s), 3 Refill(s).          lithium 300 mg oral capsule: See Instructions, TAKE 2 CAPSULES BY MOUTH EVERY MORNING, 1 CAPSULE IN THE AFTERNOON, AND 1 CAPSULE AT NIGHT, 120 cap(s), 0  Refill(s).          propranolol 60 mg oral tablet: 1 tab(s), Oral, tid, 270 tab(s), 0 Refill(s).          tiZANidine 4 mg oral tablet: 1 tab(s), Oral, q8 hrs, PRN: AS NEEDED FOR BACK PAIN, 60 tab(s), 0 Refill(s).       Problem list:    All Problems  Morbid obesity / 769529835 / Probable  Tobacco user / 453246581 / Probable  Bipolar disorder / 31227440 / Confirmed  JENNY (generalized anxiety disorder) / 14120331 / Confirmed  History of asthma / 313573889 / Confirmed  Hypertension / 8435656948 / Confirmed  Depression / 91452621 / Confirmed      Histories   Past Medical History:    No active or resolved past medical history items have been selected or recorded.   Family History:    No family history items have been selected or recorded.   Procedure history:    Appendectomy (SNOMED CT 096049211) in 2009 at 14 Years.  Myringotomy and insertion of T tube (SNOMED CT 526346523).   Social History:        Alcohol Assessment            Current, 1-2 times per month      Tobacco Assessment            Smoker, current status unknown, Cigarettes, 10 per day.      Substance Abuse Assessment            Current, Marijuana      Nutrition and Health Assessment            Type of diet: Regular.      Exercise and Physical Activity Assessment            Exercise frequency: Never.      Sexual Assessment            Sexually active: No.  Identifies as female, Sexual orientation: Lesbian, morales or homosexual.  Contraceptive               Use Details: Birth control pill.        Physical Examination   Vital Signs   8/13/2019 11:19 AM CDT Peripheral Pulse Rate 64 bpm    Systolic Blood Pressure 122 mmHg    Diastolic Blood Pressure 64 mmHg    Mean Arterial Pressure 83 mmHg      Measurements from flowsheet : Measurements   8/13/2019 11:19 AM CDT Height Measured - Standard 68 in    Weight Measured - Standard 287.8 lb    BSA 2.5 m2    Body Mass Index 43.76 kg/m2  HI      General:  Alert and oriented, No acute distress.    Eye:  Pupils are equal, round and  reactive to light, Normal conjunctiva.    HENT:  Oral mucosa is moist.    Neck:  Supple, No lymphadenopathy.    Respiratory:  Lungs are clear to auscultation, Respirations are non-labored.    Cardiovascular:  Normal rate, Regular rhythm, No edema.    Gastrointestinal:  Non-distended.    Musculoskeletal:  Normal gait.    Integumentary:  Warm, No rash.    Psychiatric:  Cooperative, Appropriate mood & affect, Normal judgment.       Review / Management   Results review:  Lab results   8/13/2019 11:48 AM CDT Sodium Level 139 mmol/L    Potassium Level 4.1 mmol/L    Chloride Level 107 mmol/L    CO2 Level 24 mmol/L    Glucose Level 92 mg/dL    BUN 10 mg/dL    Creatinine 1.00 mg/dL    BUN/Creat Ratio NOT APPLICABLE    eGFR 78 mL/min/1.73m2    eGFR African American 90 mL/min/1.73m2    Calcium Level 9.4 mg/dL    TSH 2.67 mIU/L    Lithium Level 0.6 mmol/L   6/18/2019 11:43 AM CDT Sodium Level 136 mmol/L    Potassium Level 4.0 mmol/L    Chloride Level 106 mmol/L    CO2 Level 21 mmol/L    Glucose Level 89 mg/dL    BUN 12 mg/dL    Creatinine 0.96 mg/dL    BUN/Creat Ratio NOT APPLICABLE    eGFR 82 mL/min/1.73m2    eGFR African American 95 mL/min/1.73m2    Calcium Level 9.3 mg/dL    TSH 4.99 mIU/L  HI    WBC 15.2  HI    RBC 4.51    Hgb 13.7 gm/dL    Hct 40.9 %    MCV 90.7 fL    MCH 30.4 pg    MCHC 33.5 gm/dL    RDW 12.3 %    Platelet 307    MPV 10.0 fL    Lymphs 24.8 %    Abs Lymphs 3,770    Neutrophils 68.1 %    Abs Neutrophils 10,351  HI    Monocytes 4.5 %    Abs Monocytes 684    Eosinophils 2.0 %    Abs Eosinophils 304    Basophils 0.6 %    Abs Basophils 91    Lithium Level 0.6 mmol/L   .       Impression and Plan   Diagnosis     Bipolar disorder (SPZ88-PN F31.9).     Depression (IXO63-RK F32.9).     JENNY (generalized anxiety disorder) (PWV37-XR F41.1).     Morbid obesity (GYH71-XF E66.01).     Course:  started saxenda and lost 4 pounds the first week by her home scale.    Plan:  Discussed that this month she should find a few  small things this month that she thinks she will enjoy like going to the library, get some seed or bulbs and plant them, buy a bird feeder and sit out and watch the birds. We will increase her Citalopram to 40mg daily. Will have her follow up in 1 month.  I, Nelda Tran [facility], acted solely as a scribe for, and in the presence of Dr. Marco A Craig who performed the service..

## 2022-02-16 NOTE — PROGRESS NOTES
Patient:   JACLYN SARMIENTO            MRN: [id number]            FIN: [id number]               Age:   24 years     Sex:  Female     :  1995   Associated Diagnoses:   None   Author:   Marco A Craig MD       -   Today's date:  2019 1:04:00 PM .        -   To whom it may concern:        This patient is currently under my care.  Patient has ongoing care for her mental health.  The severity of her disease has made employment not possible.  I expect her diseases to continue to be a block to employment for the next year.       -   Sincerely,

## 2022-02-16 NOTE — PROGRESS NOTES
Chief Complaint    Pt here for med check. Pt states that she feels good but could be better.  History of Present Illness      24-year-old here following up on her bipolar disorder with depression anxiety.       She is excited that this month she actually called a friend and then has gone over there several times to socialize.  She had a visit with her mom but did not go as well.  Her mom wants her to go faster at improving.       She is been sleeping well still struggling a little bit with getting out walking  Review of Systems      Insert default review of systems  Physical Exam   Vitals & Measurements    HR: 80(Peripheral)  BP: 110/70     HT: 68 in  WT: 289.6 lb  BMI: 44.03       Alert and oriented talkative normal speech flow and patterns      No tremors       Normal gait  Assessment/Plan       1. Bipolar disorder (F31.9)         Overall her mental health seems to be improving with talked about goals both short-term and long-term and continuing with her counseling       2. JENNY (generalized anxiety disorder) (F41.1)        As above        3. Depression (F32.9)         As above  Patient Information     Name:JACLYN SARMIENTO      Address:      [address] [place] [address]     Sex:Female     YOB: 1995     [phone]     Emergency Contact:EVERTON BROWN     MRN:[id number]     FIN:[id number]     Location:[facility]     Date of Service:09/10/2019      Primary Care Physician:       NONE ,       Attending Physician:       Marco A Craig MD, [phone]  Problem List/Past Medical History    Ongoing     Bipolar disorder     Depression     JENNY (generalized anxiety disorder)     History of asthma     Hypertension     Morbid obesity     Tobacco user    Historical     No qualifying data  Procedure/Surgical History     Appendectomy (2009)     Myringotomy and insertion of T tube  Medications    ALPRAZolam 1 mg oral tablet, 1 mg= 1 tab(s), Oral, q6-8 hrs, PRN     citalopram 40 mg oral tablet, 40 mg= 1 tab(s), Oral, daily, 1 refills    lisinopril 20 mg oral tablet, 1 tab(s), Oral, daily, 3 refills    lithium 300 mg oral capsule, See Instructions    propranolol 60 mg oral tablet, 1 tab(s), Oral, tid    QUEtiapine 200 mg oral tablet, 1 tab(s), Oral, qhs    Reclipsen 0.15 mg-0.03 mg oral tablet, 1 tab(s), Oral, daily    Saxenda 18 mg/3 mL subcutaneous solution, 1.2 mg, Subcutaneous, daily, 2 refills    tiZANidine 4 mg oral tablet, 1 tab(s), Oral, q8 hrs, PRN  Allergies    Metamucil (throat swelling, facial swelling)  Social History    Smoking Status - 08/13/2019     Current every day smoker     Alcohol      Current, 1-2 times per month, 02/21/2019     Exercise      Exercise frequency: Never., 02/21/2019     Nutrition/Health      Type of diet: Regular., 02/21/2019     Sexual      Sexually active: No. Identifies as female, Sexual orientation: Lesbian, morales or homosexual. Contraceptive Use Details: Birth control pill., 02/21/2019     Substance Abuse      Current, Marijuana, 02/21/2019     Tobacco      Smoker, current status unknown, Cigarettes, 10 per day., 02/21/2019  Immunizations      Vaccine Date Status      tetanus/diphth/pertuss (Tdap) adult/adol 11/16/2009 Recorded      MMR (measles/mumps/rubella) 10/18/1994 Recorded      MMR (measles/mumps/rubella) 10/18/1994 Recorded      OPV 09/27/1994 Recorded      Hep B 09/27/1994 Recorded      DTaP-Hib 09/27/1994 Recorded      OPV 04/11/1994 Recorded      DTaP-Hib 04/11/1994 Recorded      MMR (measles/mumps/rubella) 04/11/1994 Recorded      OPV 1993 Recorded      DTaP-Hib 1993 Recorded      OPV 1993 Recorded      Hep B 1993 Recorded      DTaP-Hib 1993 Recorded      Hep B 1993 Recorded  Lab Results          Lab Results (Last 4 results within 90 days)           Sodium Level: 139 mmol/L [135 mmol/L - 146 mmol/L] (08/13/19 11:48:00)          Sodium Level: 136 mmol/L [135 mmol/L - 146 mmol/L] (06/18/19  11:43:00)          Potassium Level: 4.1 mmol/L [3.5 mmol/L - 5.3 mmol/L] (08/13/19 11:48:00)          Potassium Level: 4 mmol/L [3.5 mmol/L - 5.3 mmol/L] (06/18/19 11:43:00)          Chloride Level: 107 mmol/L [98 mmol/L - 110 mmol/L] (08/13/19 11:48:00)          Chloride Level: 106 mmol/L [98 mmol/L - 110 mmol/L] (06/18/19 11:43:00)          CO2 Level: 24 mmol/L [20 mmol/L - 32 mmol/L] (08/13/19 11:48:00)          CO2 Level: 21 mmol/L [20 mmol/L - 32 mmol/L] (06/18/19 11:43:00)          Glucose Level: 92 mg/dL [65 mg/dL - 99 mg/dL] (08/13/19 11:48:00)          Glucose Level: 89 mg/dL [65 mg/dL - 99 mg/dL] (06/18/19 11:43:00)          BUN: 10 mg/dL [7 mg/dL - 25 mg/dL] (08/13/19 11:48:00)          BUN: 12 mg/dL [7 mg/dL - 25 mg/dL] (06/18/19 11:43:00)          Creatinine Level: 1 mg/dL [0.5 mg/dL - 1.1 mg/dL] (08/13/19 11:48:00)          Creatinine Level: 0.96 mg/dL [0.5 mg/dL - 1.1 mg/dL] (06/18/19 11:43:00)          BUN/Creat Ratio: NOT APPLICABLE [6  - 22] (08/13/19 11:48:00)          BUN/Creat Ratio: NOT APPLICABLE [6  - 22] (06/18/19 11:43:00)          eGFR: 78 mL/min/1.73m2 (08/13/19 11:48:00)          eGFR: 82 mL/min/1.73m2 (06/18/19 11:43:00)          eGFR African American: 90 mL/min/1.73m2 (08/13/19 11:48:00)          eGFR African American: 95 mL/min/1.73m2 (06/18/19 11:43:00)          Calcium Level: 9.4 mg/dL [8.6 mg/dL - 10.2 mg/dL] (08/13/19 11:48:00)          Calcium Level: 9.3 mg/dL [8.6 mg/dL - 10.2 mg/dL] (06/18/19 11:43:00)          TSH: 2.67 mIU/L (08/13/19 11:48:00)          TSH: 4.99 mIU/L High (06/18/19 11:43:00)          WBC: 15.2 High [3.8  - 10.8] (06/18/19 11:43:00)          RBC: 4.51 [3.8  - 5.1] (06/18/19 11:43:00)          Hgb: 13.7 gm/dL [11.7 gm/dL - 15.5 gm/dL] (06/18/19 11:43:00)          Hct: 40.9 % [35 % - 45 %] (06/18/19 11:43:00)          MCV: 90.7 fL [80 fL - 100 fL] (06/18/19 11:43:00)          MCH: 30.4 pg [27 pg - 33 pg] (06/18/19 11:43:00)          MCHC: 33.5 gm/dL [32 gm/dL  - 36 gm/dL] (06/18/19 11:43:00)          RDW: 12.3 % [11 % - 15 %] (06/18/19 11:43:00)          Platelet: 307 [140  - 400] (06/18/19 11:43:00)          MPV: 10 fL [7.5 fL - 12.5 fL] (06/18/19 11:43:00)          Lymphocytes: 24.8 % (06/18/19 11:43:00)          Abs Lymphocytes: 3770 [850  - 3900] (06/18/19 11:43:00)          Neutrophils: 68.1 % (06/18/19 11:43:00)          Abs Neutrophils: 99948 High [1500  - 7800] (06/18/19 11:43:00)          Monocytes: 4.5 % (06/18/19 11:43:00)          Abs Monocytes: 684 [200  - 950] (06/18/19 11:43:00)          Eosinophils: 2 % (06/18/19 11:43:00)          Abs Eosinophils: 304 [15  - 500] (06/18/19 11:43:00)          Basophils: 0.6 % (06/18/19 11:43:00)          Abs Basophils: 91 [0  - 200] (06/18/19 11:43:00)          Lithium Level: 0.6 mmol/L [0.6 mmol/L - 1.2 mmol/L] (08/13/19 11:48:00)          Lithium Level: 0.6 mmol/L [0.6 mmol/L - 1.2 mmol/L] (06/18/19 11:43:00)

## 2022-02-16 NOTE — TELEPHONE ENCOUNTER
---------------------  From: Laine Plascencia CMA (Phone Messages Pool ([id number]))   To: [facility] Message Pool ([facility]);     Sent: 2/17/2020 10:54:29 AM CST  Subject: Alprazolam refill     Phone Message    PCP:   SYDNI      Time of Call:  1045       Person Calling:  Patient   Phone number:  [phone]    Note:   Moving back to this area from [place]. Wonders if SYDNI would refill Alprazolam. Send to [facility] in [place]. Can come in for a office visit in a month after she is moved in and settled. Please advise.  Last office visit and reason:  11-14-19 DVT with SYDNI---------------------  From: Missy Tran CMA ([facility] Message Pool ([id number]))   To: Marco A Craig MD;     Sent: 2/17/2020 12:52:23 PM CST  Subject: FW: Alprazolam refill     Please advise---------------------  From: Marco A Craig MD   To: SYD[facility] Message Pool ([id number]);     Sent: 2/17/2020 12:55:11 PM CST  Subject: RE: Alprazolam refill     ok for 1 month---------------------  From: Missy Tran CMA ([facility] Message Pool ([id number]_[facility]))   To: Marco A Craig MD;     Sent: 2/17/2020 1:10:12 PM CST  Subject: FW: Alprazolam refill     I forwarded rx to you to sign off on.---------------------  From: Marco A Craig MD   To: [facility] Message Pool ([id number]);     Sent: 2/17/2020 1:28:10 PM CST  Subject: RE: Alprazolam refill     donePt called and informed at 1:40pm this was sent. I also asked she make an appt to follow up once she is setted back in area

## 2022-02-16 NOTE — NURSING NOTE
Comprehensive Intake Entered On:  7/16/2019 11:16 AM CDT    Performed On:  7/16/2019 11:12 AM CDT by Missy Tran CMA               Summary   Chief Complaint :   Med check   Menstrual Status :   Menarcheal   Weight Measured :   285 lb(Converted to: 285 lb 0 oz, 129.27 kg)    Height Measured :   68 in(Converted to: 5 ft 8 in, 172.72 cm)    Body Mass Index :   43.33 kg/m2 (HI)    Body Surface Area :   2.49 m2   Systolic Blood Pressure :   122 mmHg   Diastolic Blood Pressure :   74 mmHg   Mean Arterial Pressure :   90 mmHg   Peripheral Pulse Rate :   72 bpm   Missy Tran CMA - 7/16/2019 11:12 AM CDT   Health Status   Allergies Verified? :   Yes   Medication History Verified? :   Yes   Pre-Visit Planning Status :   Completed   Tobacco Use? :   Current every day smoker   Missy Tran CMA - 7/16/2019 11:12 AM CDT   Consents   Consent for Immunization Exchange :   Consent Granted   Consent for Immunizations to Providers :   Consent Granted   Missy Tran CMA - 7/16/2019 11:12 AM CDT   Meds / Allergies   (As Of: 7/16/2019 11:16:35 AM CDT)   Allergies (Active)   Metamucil  Estimated Onset Date:   Unspecified ; Reactions:   throat swelling, facial swelling ; Created By:   Jovana Epps; Reaction Status:   Active ; Category:   Drug ; Substance:   Metamucil ; Type:   Allergy ; Severity:   Severe ; Updated By:   Jovana Epps; Reviewed Date:   2/27/2019 10:59 AM CST        Medication List   (As Of: 7/16/2019 11:16:35 AM CDT)   Prescription/Discharge Order    ALPRAZolam  :   ALPRAZolam ; Status:   Prescribed ; Ordered As Mnemonic:   ALPRAZolam 1 mg oral tablet ; Simple Display Line:   1 mg, 1 tab(s), PO, q6-8 hrs, PRN: for anxiety, 30 tab(s), 0 Refill(s) ; Ordering Provider:   Marco A Craig MD; Catalog Code:   ALPRAZolam ; Order Dt/Tm:   6/18/2019 11:33:10 AM          citalopram  :   citalopram ; Status:   Prescribed ; Ordered As Mnemonic:   citalopram 40 mg oral tablet ; Simple Display  Line:   40 mg, 1 tab(s), Oral, daily, for 90 day(s), 90 tab(s), 1 Refill(s) ; Ordering Provider:   Marco A Craig MD; Catalog Code:   citalopram ; Order Dt/Tm:   6/18/2019 11:36:57 AM          desogestrel-ethinyl estradiol  :   desogestrel-ethinyl estradiol ; Status:   Prescribed ; Ordered As Mnemonic:   Reclipsen 0.15 mg-0.03 mg oral tablet ; Simple Display Line:   1 tab(s), Oral, daily, 28 tab(s), 0 Refill(s) ; Ordering Provider:   Marco A Craig MD; Catalog Code:   desogestrel-ethinyl estradiol ; Order Dt/Tm:   5/21/2019 1:17:28 PM          lisinopril  :   lisinopril ; Status:   Prescribed ; Ordered As Mnemonic:   lisinopril 20 mg oral tablet ; Simple Display Line:   20 mg, 1 tab(s), PO, Daily, 90 tab(s), 0 Refill(s) ; Ordering Provider:   Marco A Craig MD; Catalog Code:   lisinopril ; Order Dt/Tm:   4/17/2019 2:09:21 PM ; Comment:   pt's insurace requires 90 day supply          lithium 300 mg oral capsule  :   lithium 300 mg oral capsule ; Status:   Prescribed ; Ordered As Mnemonic:   lithium 300 mg oral capsule ; Simple Display Line:   See Instructions, TAKE 2 CAPSULES BY MOUTH EVERY MORNING, 1 CAPSULE IN THE AFTERNOON, AND 1 CAPSULE AT NIGHT, 120 cap(s) ; Ordering Provider:   Marco A Craig MD; Catalog Code:   lithium ; Order Dt/Tm:   7/1/2019 3:43:10 PM          lithium  :   lithium ; Status:   Prescribed ; Ordered As Mnemonic:   lithium 300 mg oral capsule ; Simple Display Line:   See Instructions, 2 caps every AM, 1 cap in the afternoon, and 1 cap at night, 120 tab(s), 0 Refill(s) ; Ordering Provider:   Marco A Craig MD; Catalog Code:   lithium ; Order Dt/Tm:   5/21/2019 1:17:54 PM          propranolol  :   propranolol ; Status:   Prescribed ; Ordered As Mnemonic:   propranolol 60 mg oral tablet ; Simple Display Line:   60 mg, 1 tab(s), Oral, tid, 270 tab(s), 0 Refill(s) ; Ordering Provider:   Marco A Craig MD; Catalog Code:   propranolol ; Order Dt/Tm:   4/17/2019 12:55:54 PM           QUEtiapine 200 mg oral tablet  :   QUEtiapine 200 mg oral tablet ; Status:   Prescribed ; Ordered As Mnemonic:   QUEtiapine 200 mg oral tablet ; Simple Display Line:   See Instructions, TAKE 1 TABLET BY MOUTH AT BEDTIME, 30 tab(s) ; Ordering Provider:   Marco A Craig MD; Catalog Code:   QUEtiapine ; Order Dt/Tm:   7/1/2019 3:43:22 PM          QUEtiapine  :   QUEtiapine ; Status:   Prescribed ; Ordered As Mnemonic:   SEROquel 200 mg oral tablet ; Simple Display Line:   200 mg, 1 tab(s), Oral, hs, 30 tab(s), 0 Refill(s) ; Ordering Provider:   Mraco A Craig MD; Catalog Code:   QUEtiapine ; Order Dt/Tm:   5/21/2019 1:18:22 PM          tiZANidine  :   tiZANidine ; Status:   Prescribed ; Ordered As Mnemonic:   tiZANidine 4 mg oral tablet ; Simple Display Line:   1 tab(s), Oral, q8 hrs, PRN: AS NEEDED FOR BACK PAIN, 30 tab(s), 0 Refill(s) ; Ordering Provider:   Marco A Craig MD; Catalog Code:   tiZANidine ; Order Dt/Tm:   6/18/2019 11:33:31 AM

## 2022-02-16 NOTE — TELEPHONE ENCOUNTER
---------------------  From: Katiana Gomez CMA (eRx Pool ([id number]_[facility]))   To: [facility] Message Pool ([id number]_[place] [facility] [place]);     Sent: 3/5/2019 9:26:01 AM CST  Subject: FW: Medication Management   Due Date/Time: 3/6/2019 8:45:00 AM CST     Medication Refill needing approval    PCP:   N/A, last seen & filled by SYDNI    Medication:   tizanidine  Last Filled:  2/19/19    Quantity:  30  Refills:  0  CSA on file?   n/a    Date of last office visit and reason:   2/19/19; [facility] care - mental health  Date of last labs pertaining to condition:  n/a    Return to Clinic order placed?  yes; due end of this month      ------------------------------------------  From: [employer] [id number]  To: Marco A Craig MD  Sent: March 5, 2019 8:45:48 AM CST  Subject: Medication Management  Due: March 6, 2019 8:45:48 AM CST    ** On Hold Pending Signature **  Drug: tiZANidine (tiZANidine 4 mg oral tablet)  1 TAB(S) ORAL Q8 HRS,PRN:AS NEEDED FOR BACK PAIN  Quantity: 30 tab(s)     Days Supply: 0         Refills: 0  Substitutions Allowed  Notes from Pharmacy:     Dispensed Drug: tiZANidine (tiZANidine 4 mg oral tablet)  TAKE 1 TABLET BY MOUTH EVERY 8 HOURS AS NEEDED FOR BACK PAIN  Quantity: 30 tab(s)     Days Supply: 10        Refills: 0  Substitutions Allowed  Notes from Pharmacy:   ---------------------------------------------------------------  From: Mary Anne Rios MA ([facility] Message Pool ([id number]_[facility]))   To: Marco A Craig MD;     Sent: 3/5/2019 9:28:41 AM CST  Subject: FW: Medication Management   Due Date/Time: 3/6/2019 8:45:00 AM CST---------------------  From: Marco A Craig MD   To: [facility] "[employer] [id number]    Sent: 3/5/2019 9:29:32 AM CST  Subject: FW: Medication Management     ** Submitted: **  Complete:tiZANidine (tiZANidine 4 mg oral tablet)   Signed by Marco A Craig MD  3/5/2019 9:29:00 AM    ** Approved **  tiZANidine (TIZANIDINE 4MG TABLETS)  TAKE 1 TABLET BY MOUTH EVERY 8 HOURS AS NEEDED FOR  BACK PAIN  Qty:  30 tab(s)        Days Supply:  10        Refills:  0          Substitutions Allowed     Route To Pharmacy - [facility] [id number]

## 2022-02-16 NOTE — PROGRESS NOTES
Chief Complaint    Pt here for 1 month f/u appt for bipolar medication.  History of Present Illness      24-year-old here for refill of her medications.       Patient has had bipolar disorder but has been quite disabling.  She is still in counseling she is been being seen regularly.  Her medications have generally been working and she reports being able to get out of her house.  She is planning a move to her to lose and says she spent 3 weeks here with her mom and it went well.  She came back to collect stuff so she wanted one last appointment.  She will be in [place] from now on she thinks.       Patient also had a right leg DVT that was both distal and proximal.  He is tolerating her anticoagulants.  Said her last period was quite heavy she was checked and had a normal hemoglobin.  I explained how thinning her blood will make bruises more likely and periods have a year  Review of Systems      No chest pains, no chest pressure, no shortness of breath       Still having some leg swelling but it is improving and she can fit in her shoes now  Physical Exam   Vitals & Measurements    T: 98.0   F (Tympanic)  HR: 82(Peripheral)  BP: 132/86  SpO2: 98%     HT: 68 in  WT: 291.6 lb  BMI: 44.33       Alert and talkative       Breathing is nonlabored        Appropriate cognition  Assessment/Plan       1. Bipolar disorder (F31.9)         I refilled her meds she will continue see her counselor and follow-up with her previous psychiatrist in [place] in the next month       2. Right leg DVT (I82.401)         She will be seeing her regular doctor and explained she would probably need a follow-up ultrasound of the leg in the next 1 or 2 months  Patient Information     Name:JACLYN SARMIENTO      Address:      [address] [place] [id number]     Sex:Female     YOB: 1995     [phone]     Emergency Contact:EVERTON BROWN     MRN:[id number]     FIN:[id number]     Location:[facility]      Date of Service:11/14/2019      Primary Care Physician:       NONE ,       Attending Physician:       Marco A Craig MD, [phone]  Problem List/Past Medical History    Ongoing     Bipolar disorder     Depression     JENNY (generalized anxiety disorder)     History of asthma     Hypertension     Morbid obesity     Tobacco user    Historical     No qualifying data  Procedure/Surgical History     Appendectomy (2009)     Myringotomy and insertion of T tube  Medications    ALPRAZolam 1 mg oral tablet, 1 mg= 1 tab(s), Oral, q6-8 hrs, PRN, 2 refills    citalopram 40 mg oral tablet, 40 mg= 1 tab(s), Oral, daily, 1 refills    lisinopril 20 mg oral tablet, 1 tab(s), Oral, daily, 3 refills    lithium 300 mg oral capsule, See Instructions, 3 refills    propranolol 60 mg oral tablet, 1 tab(s), Oral, tid    QUEtiapine 200 mg oral tablet, 1 tab(s), Oral, qhs, 1 refills    Reclipsen 0.15 mg-0.03 mg oral tablet, 1 tab(s), Oral, daily    tiZANidine 4 mg oral tablet, 1 tab(s), Oral, q8 hrs, PRN    Xarelto 20 mg oral tablet, 20 mg= 1 tab(s), Oral, qpm, 5 refills  Allergies    Metamucil (throat swelling, facial swelling)  Social History    Smoking Status - 11/14/2019     Current every day smoker     Alcohol      Current, 1-2 times per month, 02/21/2019     Exercise      Exercise frequency: Never., 02/21/2019     Nutrition/Health      Type of diet: Regular., 02/21/2019     Sexual      Sexually active: No. Identifies as female, Sexual orientation: Lesbian, morales or homosexual. Contraceptive Use Details: Birth control pill., 02/21/2019     Substance Abuse      Current, Marijuana, 02/21/2019     Tobacco      Smoker, current status unknown, Cigarettes, 10 per day., 02/21/2019  Immunizations      Vaccine Date Status      tetanus/diphth/pertuss (Tdap) adult/adol 11/16/2009 Recorded      MMR (measles/mumps/rubella) 10/18/1994 Recorded      MMR (measles/mumps/rubella) 10/18/1994 Recorded      OPV 09/27/1994 Recorded      Hep B  09/27/1994 Recorded      DTaP-Hib 09/27/1994 Recorded      OPV 04/11/1994 Recorded      DTaP-Hib 04/11/1994 Recorded      MMR (measles/mumps/rubella) 04/11/1994 Recorded      OPV 1993 Recorded      DTaP-Hib 1993 Recorded      OPV 1993 Recorded      Hep B 1993 Recorded      DTaP-Hib 1993 Recorded      Hep B 1993 Recorded

## 2022-02-16 NOTE — PROGRESS NOTES
Patient:   JACLYN SARMIENTO            MRN: [id number]            FIN: [id number]               Age:   23 years     Sex:  Female     :  1995   Associated Diagnoses:   Bipolar disease, chronic; Depression, major; Anxiety; Essential hypertension   Author:   Marco A Craig MD      Chief Complaint   2019 3:13 PM CST    Here to establish care for mental health, recently moved from [place]      History of Present Illness   see chief complaint as noted above and confirmed with the patient     23 year old female here today to establish care. She recently moved here from [place] to live with her brother, she did this because her brother wants to help her with her mental illnesses. Her mom and little brother live in [place] and they are very helpful and suppourtive of her as well.     Mental health: She was in therapy and was diagnosed with Bipolar Disorder, Anxiety, Depression, and OCD. She is on Lithium, Seroquel, and Propranolol. She has tried meditation she struggles as she feels her mind is too jumbled. Since being on Lithium she has not had a manic episode, she has been on Lithium for less than one year, and she was having Lithium level checked once per month. The Seroquel is for help with sleep yet has also helped with mood. She feels the worse part of being bipolar is the depression. She takes Alprazolam as needed for panic attacks, she feels she has one panic attack per day. She says she was supposed to come in two months ago but has been unable to do so because she has been so anxious. Her counseler from [place] called and made this appointment for her. She has tried Depression medications in the past, she has tried Cymbalta, Prozac and a few more and feels she had some shakiness and some brain zaps. Her therapist did not want to start her on a new depression medication and then send her away. She does not have a job at this time due to her mental illness, she hopes to get one in the near future. She  is working on a puzzle at home and this helps her mind be calm and collective.       Hypertension: She is on Lisinopril 20mg per day. Blood pressure is great in clinic today.       Review of Systems   Constitutional:  Decreased activity, No fever, No chills.    Ear/Nose/Mouth/Throat:  No nasal congestion.    Respiratory:  No shortness of breath.    Cardiovascular:  No chest pain.    Gastrointestinal:  No nausea, No vomiting.    Immunologic:  No recurrent infections.    Integumentary:  No rash.    Neurologic:  No abnormal balance, No headache.       Health Status   Allergies:    Allergic Reactions (Selected)  Severity Not Documented  Metamucil (Throat swelling and facial swelling)   Medications:  (Selected)   Documented Medications  Documented  ALPRAZolam 1 mg oral tablet: = 1 tab(s) ( 1 mg ), PO, q6-8 hrs, PRN: for anxiety, 0 Refill(s), Type: Maintenance  Reclipsen 0.15 mg-0.03 mg oral tablet: 1 tab(s), Oral, daily, 0 Refill(s), Type: Maintenance  SEROquel 200 mg oral tablet: = 1 tab(s) ( 200 mg ), Oral, hs, 0 Refill(s), Type: Maintenance  lisinopril 20 mg oral tablet: = 1 tab(s) ( 20 mg ), Oral, daily, 0 Refill(s), Type: Maintenance  lithium 300 mg oral capsule: See Instructions, Instructions: Take 2 caps po every am and 1 cap at HS, 0 Refill(s), Type: Maintenance  propranolol 60 mg oral tablet: = 1 tab(s) ( 60 mg ), Oral, tid, 0 Refill(s), Type: Maintenance  tiZANidine 4 mg oral tablet: = 1 tab(s) ( 4 mg ), Oral, q6 hrs, PRN: as needed for back pain, 0 Refill(s), Type: Maintenance   Problem list:    All Problems  Morbid obesity / SNOMED CT 887068600 / Probable      Histories   Past Medical History:    No active or resolved past medical history items have been selected or recorded.   Family History:    No family history items have been selected or recorded.   Procedure history:    No active procedure history items have been selected or recorded.   Social History:             No active social history items have been  recorded.      Physical Examination   Vital Signs   2/19/2019 3:13 PM CST Peripheral Pulse Rate 84 bpm    Pulse Site Radial artery    Systolic Blood Pressure 112 mmHg    Diastolic Blood Pressure 62 mmHg    Mean Arterial Pressure 79 mmHg    BP Site Right arm    Oxygen Saturation 98 %      Measurements from flowsheet : Measurements   2/19/2019 3:13 PM CST Height Measured - Standard 68.00 in    Weight Measured - Standard 272.4 lb    BSA 2.43 m2    Body Mass Index 41.41 kg/m2  HI      General:  Alert and oriented, No acute distress.    Eye:  Pupils are equal, round and reactive to light, Normal conjunctiva.    HENT:  Oral mucosa is moist.    Neck:  Supple.    Respiratory:  Respirations are non-labored.    Cardiovascular:  Normal rate, Regular rhythm, No edema.    Gastrointestinal:  Non-distended.    Musculoskeletal:  Normal gait.    Integumentary:  Warm, No rash.    Neurologic:  Alert, Oriented.    Psychiatric:  Cooperative, Appropriate mood & affect, Normal judgment.       Review / Management   Results review      Impression and Plan       Diagnosis     Bipolar disease, chronic (ESK65-HX F31.9).     Depression, major (DTG85-ES F32.9).     Anxiety (BIC84-UZ F41.9).     Essential hypertension (YWU30-HK I10).     Course:  Progressing as expected.    Plan:  Refilled medications for one month.     Encouraged that brother know what a bipolar manic episode is so he is aware if she is having one.     Discussed that even thought she feels she may not be good at meditating, she should continue to work on it as it can greatly help her anxiety and depressive symptoms.     Started her one Citalopram 20mg daily.     Return in one month for follow up appointment, will obtain labs at that time.     She seems to understand Bipolar disease quite well, she is well educated on it, and she has moved here to better herself, she is hoping to get a job in the near future..    Isabella GANDHI Medical Assistant acted solely as a scribe  for, and in presence of Dr. Marco A Craig who performed the services.

## 2022-02-16 NOTE — TELEPHONE ENCOUNTER
Entered by Eve Kerr CMA on June 05, 2019 4:15:11 PM CDT  ---------------------  From: Eve Kerr CMA   To: [id number]    Sent: 6/5/2019 4:15:11 PM CDT  Subject: Medication Management     ** Not Approved: Refill not appropriate, filled on 5/21/19 **  tiZANidine (TIZANIDINE 4MG TABLETS)  TAKE 1 TABLET BY MOUTH EVERY 8 HOURS AS NEEDED FOR BACK PAIN  Qty:  30 tab(s)        Days Supply:  10        Refills:  0          Substitutions Allowed     Route To Pharmacy - [id number]   Signed by Eve Kerr CMA            ------------------------------------------  From: [facility] [id number]  To: Marco A Craig MD  Sent: June 4, 2019 11:53:24 AM CDT  Subject: Medication Management  Due: June 5, 2019 11:53:24 AM CDT    ** On Hold Pending Signature **  Drug: tiZANidine (tiZANidine 4 mg oral tablet)  1 TAB(S) ORAL Q8 HRS,PRN:AS NEEDED FOR BACK PAIN  Quantity: 30 unknown unit  Days Supply: 0         Refills: 0  Substitutions Allowed  Notes from Pharmacy:     Dispensed Drug: tiZANidine (tiZANidine 4 mg oral tablet)  TAKE 1 TABLET BY MOUTH EVERY 8 HOURS AS NEEDED FOR BACK PAIN  Quantity: 30 tab(s)     Days Supply: 10        Refills: 0  Substitutions Allowed  Notes from Pharmacy:   ------------------------------------------

## 2022-02-16 NOTE — TELEPHONE ENCOUNTER
---------------------  From: Ramona Giron CMA (Phone Messages Pool ([id number]))   To: SYDNI Message Pool ([id number]_[place]);     Sent: 5/14/2019 6:08:02 PM CDT  Subject: General Message-Citalopram refill     Phone Message:      PCP: SYDNI    Person Calling: Pt-Dilia  Phone: [phone]  Time: 5:39pm    Reason for call: Pt called stating that she is out of town in [place] until this weekend and is running out of her Citalopram medication and is needing a refill sent to the [facility]  on [address] [place] [address]. Last seen for med check on 4/17/19 and Citalopram was increased to 40mg QD and advised to RTC in 1 month for recheck. Pt states that it is still hard to tell if the 40mg is working or not. Wondering if Dr. Craig wants to keep her at 40mg a little longer or increase dose? Please advise-pt alright with waiting until ZIM in clinic on Wed 5/15.    Impression and Plan   Diagnosis     Bipolar disorder (EOV18-CZ F31.9).     Depression (CNR99-DE F32.9).     JENNY (generalized anxiety disorder) (QXE27-CE F41.1).     Morbid obesity (DNS71-BX E66.01).     Plan:  Discussed that this month she should find a few small things this month that she thinks she will enjoy like going to the library, get some seed or bulbs and plant them, buy a bird feeder and sit out and watch the birds. We will increase her Citalopram to 40mg daily. Will have her follow up in 1 month.  I, Nelda Tran CMA, acted solely as a scribe for, and in the presence of Dr. Marco A Craig who performed the service..                Note:    Transferred to:---------------------  From: Missy Tran CMA ([facility] Message Pool ([id number]_[facility]))   To: Marco A Craig MD;     Sent: 5/15/2019 8:20:09 AM CDT  Subject: FW: General Message-Citalopram refill  ** Submitted: **  Order:citalopram (citalopram 40 mg oral tablet)  1 tab(s)  Oral  daily  Qty:  30 tab(s)        Refills:  0          Substitutions Allowed      Route To Pharmacy - [id number]    Signed by Marco A Craig MD  5/15/2019 8:31:00 AM---------------------  From: Marco A Craig MD   To: [facility] Message Pool ([id number]_[place]);     Sent: 5/15/2019 8:31:54 AM CDT  Subject: RE: General Message-Citalopram refill     sent in rx for 30 days.   stay on the 40 till back and seenPt called and informed . She agrees to plan

## 2022-02-16 NOTE — LETTER
(Inserted Image. Unable to display)   March 21, 2019        JACLYN SARMIENTO      [address], [place] [id number]        Dear JACLYN,    Thank you for choosing [facility] for your healthcare needs. Below you will find the results of your recent test(s) done at our clinic.      Your labs are normal.        Result Name Current Result Reference Range   Sodium Level (mmol/L)  139 3/20/2019 135 - 146   Potassium Level (mmol/L)  4.4 3/20/2019 3.5 - 5.3   Chloride Level (mmol/L)  108 3/20/2019 98 - 110   CO2 Level (mmol/L)  23 3/20/2019 20 - 32   Glucose Level (mg/dL)  86 3/20/2019 65 - 99   BUN (mg/dL)  13 3/20/2019 7 - 25   Creatinine Level (mg/dL)  0.94 3/20/2019 0.50 - 1.10   eGFR (mL/min/1.73m2)  84 3/20/2019 > OR = 60 -    Calcium Level (mg/dL)  9.7 3/20/2019 8.6 - 10.2   Bilirubin Total (mg/dL)  0.4 3/20/2019 0.2 - 1.2   Bilirubin Direct (mg/dL)  0.1 3/20/2019  - < OR = 0.2   Bilirubin Indirect  0.3 3/20/2019 0.2 - 1.2   Alkaline Phosphatase (unit/L)  41 3/20/2019 33 - 115   AST/SGOT (unit/L)  10 3/20/2019 10 - 30   ALT/SGPT (unit/L)  10 3/20/2019 6 - 29   Protein Total (gm/dL)  6.8 3/20/2019 6.1 - 8.1   Albumin Level (gm/dL)  4.0 3/20/2019 3.6 - 5.1   Globulin  2.8 3/20/2019 1.9 - 3.7   A/G Ratio  1.4 3/20/2019 1.0 - 2.5   TSH (mIU/L)  1.95 3/20/2019    WBC ((H)) 10.9 3/20/2019 3.8 - 10.8   RBC  4.27 3/20/2019 3.80 - 5.10   Hgb (gm/dL)  13.1 3/20/2019 11.7 - 15.5   Hct (%)  38.2 3/20/2019 35.0 - 45.0   MCV (fL)  89.5 3/20/2019 80.0 - 100.0   MCH (pg)  30.7 3/20/2019 27.0 - 33.0   MCHC (gm/dL)  34.3 3/20/2019 32.0 - 36.0   RDW (%)  12.4 3/20/2019 11.0 - 15.0   Platelet  321 3/20/2019 140 - 400   MPV (fL)  10.7 3/20/2019 7.5 - 12.5   Lithium Level (mmol/L)  0.8 3/20/2019 0.6 - 1.2       Please contact me or my assistant at [phone] if you have any questions or concerns.     Sincerely,        Marco A Craig MD        What do your labs mean?  Below is a glossary of commonly ordered  labs:  LDL   Bad Cholesterol   HDL   Good Cholesterol  AST/ALT   Liver Function   Cr/Creatinine   Kidney Function  Microalbumin   Kidney Function  BUN   Kidney Function  PSA   Prostate    TSH   Thyroid Hormone  HgbA1c   Diabetes Test   Hgb (Hemoglobin)   Red Blood Cells

## 2022-02-16 NOTE — NURSING NOTE
Comprehensive Intake Entered On:  2/19/2019 3:21 PM CST    Performed On:  2/19/2019 3:13 PM CST by Isabella Mendez MA               Summary   Chief Complaint :   Here to establish care for mental health, recently moved from [place]    Last Menstrual Period :   2/16/2019 CST   Menstrual Status :   Menarcheal   Weight Measured :   272.4 lb(Converted to: 272 lb 6 oz, 123.56 kg)    Height Measured :   68.00 in(Converted to: 5 ft 8 in, 172.72 cm)    Body Mass Index :   41.41 kg/m2 (HI)    Body Surface Area :   2.43 m2   Systolic Blood Pressure :   112 mmHg   Diastolic Blood Pressure :   62 mmHg   Mean Arterial Pressure :   79 mmHg   Peripheral Pulse Rate :   84 bpm   BP Site :   Right arm   Pulse Site :   Radial artery   Oxygen Saturation :   98 %   Isabella Mendez MA - 2/19/2019 3:13 PM CST   Health Status   Allergies Verified? :   Yes   Medication History Verified? :   Yes   Medical History Verified? :   No   Pre-Visit Planning Status :   Completed   Tobacco Use? :   Current every day smoker   Isabella Mendez MA - 2/19/2019 3:13 PM CST   Consents   Consent for Immunization Exchange :   Consent Granted   Consent for Immunizations to Providers :   Consent Granted   Isabella Mendez MA - 2/19/2019 3:13 PM CST   Meds / Allergies   (As Of: 2/19/2019 3:21:56 PM CST)   Allergies (Active)   Metamucil  Estimated Onset Date:   Unspecified ; Reactions:   facial swelling, throat swelling ; Created By:   Isabella Mendez MA; Reaction Status:   Active ; Category:   Drug ; Substance:   Metamucil ; Type:   Allergy ; Updated By:   Isabella Mendez MA; Reviewed Date:   2/19/2019 3:16 PM CST        Medication List   (As Of: 2/19/2019 3:21:56 PM CST)   Home Meds    ALPRAZolam  :   ALPRAZolam ; Status:   Documented ; Ordered As Mnemonic:   ALPRAZolam 1 mg oral tablet ; Simple Display Line:   1 mg, 1 tab(s), PO, q6-8 hrs, PRN: for anxiety, 0 Refill(s) ; Catalog Code:   ALPRAZolam ; Order Dt/Tm:   2/19/2019  3:20:08 PM          QUEtiapine  :   QUEtiapine ; Status:   Documented ; Ordered As Mnemonic:   SEROquel 200 mg oral tablet ; Simple Display Line:   200 mg, 1 tab(s), Oral, hs, 0 Refill(s) ; Catalog Code:   QUEtiapine ; Order Dt/Tm:   2/19/2019 3:19:18 PM          tiZANidine  :   tiZANidine ; Status:   Documented ; Ordered As Mnemonic:   tiZANidine 4 mg oral tablet ; Simple Display Line:   4 mg, 1 tab(s), Oral, q6 hrs, PRN: as needed for back pain, 0 Refill(s) ; Catalog Code:   tiZANidine ; Order Dt/Tm:   2/19/2019 3:19:35 PM          desogestrel-ethinyl estradiol  :   desogestrel-ethinyl estradiol ; Status:   Documented ; Ordered As Mnemonic:   Reclipsen 0.15 mg-0.03 mg oral tablet ; Simple Display Line:   1 tab(s), Oral, daily, 0 Refill(s) ; Catalog Code:   desogestrel-ethinyl estradiol ; Order Dt/Tm:   2/19/2019 3:18:49 PM          propranolol  :   propranolol ; Status:   Documented ; Ordered As Mnemonic:   propranolol 60 mg oral tablet ; Simple Display Line:   60 mg, 1 tab(s), Oral, tid, 0 Refill(s) ; Catalog Code:   propranolol ; Order Dt/Tm:   2/19/2019 3:18:21 PM          lisinopril  :   lisinopril ; Status:   Documented ; Ordered As Mnemonic:   lisinopril 20 mg oral tablet ; Simple Display Line:   20 mg, 1 tab(s), Oral, daily, 0 Refill(s) ; Catalog Code:   lisinopril ; Order Dt/Tm:   2/19/2019 3:17:37 PM          lithium  :   lithium ; Status:   Documented ; Ordered As Mnemonic:   lithium 300 mg oral capsule ; Simple Display Line:   See Instructions, Take 2 caps po every am and 1 cap at HS, 0 Refill(s) ; Catalog Code:   lithium ; Order Dt/Tm:   2/19/2019 3:16:47 PM

## 2022-02-16 NOTE — NURSING NOTE
Depression Screening Entered On:  9/11/2019 1:56 PM CDT    Performed On:  9/10/2019 1:55 PM CDT by Luzma Jauregui               Depression Screening   Little Interest - Pleasure in Activities :   More than half the days   Feeling Down, Depressed, Hopeless :   More than half the days   Initial Depression Screen Score :   4    Trouble Falling or Staying Asleep :   Nearly every day   Feeling Tired or Little Energy :   Several days   Poor Appetite or Overeating :   Nearly every day   Feeling Bad About Yourself :   Nearly every day   Trouble Concentrating :   More than half the days   Moving or Speaking Slowly :   More than half the days   Thoughts Better Off Dead or Hurting Self :   More than half the days   Detailed Depression Screen Score :   16    Total Depression Screen Score :   20    JENNY Difficulty with Work, Home, Others :   Extremely difficult   Luzma Jauregui - 9/11/2019 1:55 PM CDT

## 2022-02-16 NOTE — NURSING NOTE
Comprehensive Intake Entered On:  5/21/2019 10:18 AM CDT    Performed On:  5/21/2019 10:13 AM CDT by Missy Tran CMA               Summary   Chief Complaint :   Would like to take about medications. Was visiting mom and she states that she had noticed   Menstrual Status :   Menarcheal   Weight Measured :   273 lb(Converted to: 273 lb 0 oz, 123.83 kg)    Height Measured :   68 in(Converted to: 5 ft 8 in, 172.72 cm)    Body Mass Index :   41.5 kg/m2 (HI)    Body Surface Area :   2.43 m2   Systolic Blood Pressure :   126 mmHg   Diastolic Blood Pressure :   64 mmHg   Mean Arterial Pressure :   85 mmHg   Peripheral Pulse Rate :   72 bpm   Missy Tran CMA - 5/21/2019 10:13 AM CDT   Health Status   Allergies Verified? :   Yes   Medication History Verified? :   Yes   Pre-Visit Planning Status :   Completed   Missy Tran CMA - 5/21/2019 10:13 AM CDT   Consents   Consent for Immunization Exchange :   Consent Granted   Consent for Immunizations to Providers :   Consent Granted   Missy Tran CMA - 5/21/2019 10:13 AM CDT   Meds / Allergies   (As Of: 5/21/2019 10:18:13 AM CDT)   Allergies (Active)   Metamucil  Estimated Onset Date:   Unspecified ; Reactions:   throat swelling, facial swelling ; Created By:   Jovana Epps; Reaction Status:   Active ; Category:   Drug ; Substance:   Metamucil ; Type:   Allergy ; Severity:   Severe ; Updated By:   Jovana Epps; Reviewed Date:   2/27/2019 10:59 AM CST        Medication List   (As Of: 5/21/2019 10:18:13 AM CDT)   Prescription/Discharge Order    ALPRAZolam  :   ALPRAZolam ; Status:   Prescribed ; Ordered As Mnemonic:   ALPRAZolam 1 mg oral tablet ; Simple Display Line:   1 mg, 1 tab(s), PO, q6-8 hrs, PRN: for anxiety, 30 tab(s), 0 Refill(s) ; Ordering Provider:   Marco A Craig MD; Catalog Code:   ALPRAZolam ; Order Dt/Tm:   4/17/2019 12:45:02 PM          citalopram  :   citalopram ; Status:   Prescribed ; Ordered As Mnemonic:   citalopram 40  mg oral tablet ; Simple Display Line:   40 mg, 1 tab(s), Oral, daily, 30 tab(s), 0 Refill(s) ; Ordering Provider:   Marco A Craig MD; Catalog Code:   citalopram ; Order Dt/Tm:   5/15/2019 8:31:07 AM          desogestrel-ethinyl estradiol  :   desogestrel-ethinyl estradiol ; Status:   Prescribed ; Ordered As Mnemonic:   Reclipsen 0.15 mg-0.03 mg oral tablet ; Simple Display Line:   1 tab(s), Oral, daily, 28 tab(s), 3 Refill(s) ; Ordering Provider:   Marco A Craig MD; Catalog Code:   desogestrel-ethinyl estradiol ; Order Dt/Tm:   2/19/2019 3:59:50 PM          lisinopril  :   lisinopril ; Status:   Prescribed ; Ordered As Mnemonic:   lisinopril 20 mg oral tablet ; Simple Display Line:   20 mg, 1 tab(s), PO, Daily, 90 tab(s), 0 Refill(s) ; Ordering Provider:   Marco A Craig MD; Catalog Code:   lisinopril ; Order Dt/Tm:   4/17/2019 2:09:21 PM ; Comment:   pt's insurace requires 90 day supply          lithium  :   lithium ; Status:   Prescribed ; Ordered As Mnemonic:   lithium 300 mg oral capsule ; Simple Display Line:   See Instructions, 2 caps every AM, 1 cap in the afternoon, and 1 cap at night, 120 tab(s), 0 Refill(s) ; Ordering Provider:   Marco A Craig MD; Catalog Code:   lithium ; Order Dt/Tm:   4/17/2019 12:40:20 PM          propranolol  :   propranolol ; Status:   Prescribed ; Ordered As Mnemonic:   propranolol 60 mg oral tablet ; Simple Display Line:   60 mg, 1 tab(s), Oral, tid, 270 tab(s), 0 Refill(s) ; Ordering Provider:   Marco A Craig MD; Catalog Code:   propranolol ; Order Dt/Tm:   4/17/2019 12:55:54 PM          QUEtiapine  :   QUEtiapine ; Status:   Prescribed ; Ordered As Mnemonic:   SEROquel 200 mg oral tablet ; Simple Display Line:   200 mg, 1 tab(s), Oral, hs, 30 tab(s), 0 Refill(s) ; Ordering Provider:   Marco A Craig MD; Catalog Code:   QUEtiapine ; Order Dt/Tm:   4/17/2019 12:42:47 PM          tiZANidine  :   tiZANidine ; Status:   Prescribed ; Ordered As Mnemonic:    tiZANidine 4 mg oral tablet ; Simple Display Line:   1 tab(s), Oral, q8 hrs, PRN: AS NEEDED FOR BACK PAIN, 30 tab(s), 0 Refill(s) ; Ordering Provider:   Marco A Craig MD; Catalog Code:   tiZANidine ; Order Dt/Tm:   4/17/2019 12:41:52 PM

## 2022-02-16 NOTE — NURSING NOTE
Comprehensive Intake Entered On:  4/17/2019 12:25 PM CDT    Performed On:  4/17/2019 12:20 PM CDT by Missy Tran CMA               Summary   Chief Complaint :   Follow medications- Not noticing a difference and has been very sad   Menstrual Status :   Menarcheal   Weight Measured :   275.0 lb(Converted to: 275 lb 0 oz, 124.74 kg)    Height Measured :   68 in(Converted to: 5 ft 8 in, 172.72 cm)    Body Mass Index :   41.81 kg/m2 (HI)    Body Surface Area :   2.44 m2   Systolic Blood Pressure :   128 mmHg   Diastolic Blood Pressure :   76 mmHg   Mean Arterial Pressure :   93 mmHg   Peripheral Pulse Rate :   62 bpm   Missy Tran CMA - 4/17/2019 12:20 PM CDT   Health Status   Allergies Verified? :   Yes   Medication History Verified? :   Yes   Pre-Visit Planning Status :   Completed   Missy Tran CMA - 4/17/2019 12:20 PM CDT   Consents   Consent for Immunization Exchange :   Consent Granted   Consent for Immunizations to Providers :   Consent Granted   Missy Tran CMA - 4/17/2019 12:20 PM CDT   Meds / Allergies   (As Of: 4/17/2019 12:25:18 PM CDT)   Allergies (Active)   Metamucil  Estimated Onset Date:   Unspecified ; Reactions:   throat swelling, facial swelling ; Created By:   Jovana Epps; Reaction Status:   Active ; Category:   Drug ; Substance:   Metamucil ; Type:   Allergy ; Severity:   Severe ; Updated By:   Jovana Epps; Reviewed Date:   2/27/2019 10:59 AM CST        Medication List   (As Of: 4/17/2019 12:25:18 PM CDT)   Prescription/Discharge Order    ALPRAZolam  :   ALPRAZolam ; Status:   Prescribed ; Ordered As Mnemonic:   ALPRAZolam 1 mg oral tablet ; Simple Display Line:   1 mg, 1 tab(s), PO, q6-8 hrs, PRN: for anxiety, 30 tab(s), 0 Refill(s) ; Ordering Provider:   Marco A Craig MD; Catalog Code:   ALPRAZolam ; Order Dt/Tm:   3/20/2019 1:20:28 PM          citalopram  :   citalopram ; Status:   Prescribed ; Ordered As Mnemonic:   citalopram 20 mg oral tablet ;  Simple Display Line:   20 mg, 1 tab(s), PO, Daily, 30 tab(s), 0 Refill(s) ; Ordering Provider:   Marco A Craig MD; Catalog Code:   citalopram ; Order Dt/Tm:   3/20/2019 1:03:27 PM          desogestrel-ethinyl estradiol  :   desogestrel-ethinyl estradiol ; Status:   Prescribed ; Ordered As Mnemonic:   Reclipsen 0.15 mg-0.03 mg oral tablet ; Simple Display Line:   1 tab(s), Oral, daily, 28 tab(s), 3 Refill(s) ; Ordering Provider:   Marco A Craig MD; Catalog Code:   desogestrel-ethinyl estradiol ; Order Dt/Tm:   2/19/2019 3:59:50 PM          lisinopril  :   lisinopril ; Status:   Prescribed ; Ordered As Mnemonic:   lisinopril 20 mg oral tablet ; Simple Display Line:   20 mg, 1 tab(s), PO, Daily, 30 tab(s), 0 Refill(s) ; Ordering Provider:   Marco A Craig MD; Catalog Code:   lisinopril ; Order Dt/Tm:   2/19/2019 4:00:48 PM          lithium  :   lithium ; Status:   Prescribed ; Ordered As Mnemonic:   lithium 300 mg oral capsule ; Simple Display Line:   See Instructions, 2 caps every AM, 1 cap in the afternoon, and 1 cap at night, 120 cap(s), 0 Refill(s) ; Ordering Provider:   Marco A Craig MD; Catalog Code:   lithium ; Order Dt/Tm:   3/20/2019 1:00:20 PM          propranolol  :   propranolol ; Status:   Prescribed ; Ordered As Mnemonic:   propranolol 60 mg oral tablet ; Simple Display Line:   60 mg, 1 tab(s), Oral, tid, for 30 day(s), 90 tab(s), 0 Refill(s) ; Ordering Provider:   Marco A Craig MD; Catalog Code:   propranolol ; Order Dt/Tm:   3/20/2019 1:02:44 PM          QUEtiapine  :   QUEtiapine ; Status:   Prescribed ; Ordered As Mnemonic:   SEROquel 200 mg oral tablet ; Simple Display Line:   200 mg, 1 tab(s), Oral, hs, 30 tab(s), 0 Refill(s) ; Ordering Provider:   Marco A Craig MD; Catalog Code:   QUEtiapine ; Order Dt/Tm:   3/20/2019 1:03:04 PM          tiZANidine 4 mg oral tablet  :   tiZANidine 4 mg oral tablet ; Status:   Prescribed ; Ordered As Mnemonic:   tiZANidine 4 mg oral tablet ;  Simple Display Line:   1 tab(s), Oral, q8 hrs, PRN: AS NEEDED FOR BACK PAIN, 30 tab(s) ; Ordering Provider:   Marco A Craig MD; Catalog Code:   tiZANidine ; Order Dt/Tm:   4/3/2019 11:50:21 AM

## 2022-02-16 NOTE — PROGRESS NOTES
Chief Complaint    c/o right leg swelling, struggling to walk  History of Present Illness      24-year-old here because her right leg is swollen for the last 5 days.  She says is been hard to walk it seems to be getting worse over the last couple of days.  She denies any trouble breathing.  She has not had any chest pains or pressures.  She leads a relatively sedentary life but there is been no changes in that.  She has no history of blood clots.  Review of Systems      See HPI.  All other review of systems negative.  Physical Exam   Vitals & Measurements    HR: 86(Peripheral)  BP: 124/72  SpO2: 97%     HT: 68 in  WT: 283.6 lb  BMI: 43.12       Right leg is swollen from ankle to thigh.  She has good range of motion of his knee.  Lungs are clear to auscultation       Good distal pulses  Assessment/Plan       Swollen leg (M79.89)         Ultrasound confirms DVT happen to the proximal artery I started her on Xarelto which she will take today.  I talked about how she needs immediate attention if she has any trouble breathing.  I like to see her back early next week or sooner if she has any concerns  Patient Information     Name:JACLYN SARMIENTO      Address:      [address] [place] [address]     Sex:Female     YOB: 1995     [phone]     Emergency Contact:EVERTON BROWN     MRN:[id number]     FIN:[id number]     Location:[facility]     Date of Service:09/17/2019      Primary Care Physician:       NONE ,       Attending Physician:       Marco A Craig MD, [phone]  Problem List/Past Medical History    Ongoing     Bipolar disorder     Depression     JENNY (generalized anxiety disorder)     History of asthma     Hypertension     Morbid obesity     Tobacco user    Historical     No qualifying data  Procedure/Surgical History     Appendectomy (2009)     Myringotomy and insertion of T tube  Medications    ALPRAZolam 1 mg oral tablet, 1 mg= 1 tab(s), Oral, q6-8  hrs, PRN    citalopram 40 mg oral tablet, 40 mg= 1 tab(s), Oral, daily, 1 refills    lisinopril 20 mg oral tablet, 1 tab(s), Oral, daily, 3 refills    lithium 300 mg oral capsule, See Instructions    propranolol 60 mg oral tablet, 1 tab(s), Oral, tid    QUEtiapine 200 mg oral tablet, 1 tab(s), Oral, qhs    Reclipsen 0.15 mg-0.03 mg oral tablet, 1 tab(s), Oral, daily    Saxenda 18 mg/3 mL subcutaneous solution, 1.2 mg, Subcutaneous, daily, 2 refills    tiZANidine 4 mg oral tablet, 1 tab(s), Oral, q8 hrs, PRN    traMADol 50 mg oral tablet, 50 mg= 1 tab(s), Oral, q4 hrs, PRN    Xarelto 15 mg oral tablet, 15 mg= 1 tab(s), Oral, bid  Allergies    Metamucil (throat swelling, facial swelling)  Social History    Smoking Status - 08/13/2019     Current every day smoker     Alcohol      Current, 1-2 times per month, 02/21/2019     Exercise      Exercise frequency: Never., 02/21/2019     Nutrition/Health      Type of diet: Regular., 02/21/2019     Sexual      Sexually active: No. Identifies as female, Sexual orientation: Lesbian, morales or homosexual. Contraceptive Use Details: Birth control pill., 02/21/2019     Substance Abuse      Current, Marijuana, 02/21/2019     Tobacco      Smoker, current status unknown, Cigarettes, 10 per day., 02/21/2019  Immunizations      Vaccine Date Status      tetanus/diphth/pertuss (Tdap) adult/adol 11/16/2009 Recorded      MMR (measles/mumps/rubella) 10/18/1994 Recorded      MMR (measles/mumps/rubella) 10/18/1994 Recorded      OPV 09/27/1994 Recorded      Hep B 09/27/1994 Recorded      DTaP-Hib 09/27/1994 Recorded      OPV 04/11/1994 Recorded      DTaP-Hib 04/11/1994 Recorded      MMR (measles/mumps/rubella) 04/11/1994 Recorded      OPV 1993 Recorded      DTaP-Hib 1993 Recorded      OPV 1993 Recorded      Hep B 1993 Recorded      DTaP-Hib 1993 Recorded      Hep B 1993 Recorded  Lab Results          Lab Results (Last 4 results within 90 days)           Sodium  Level: 139 mmol/L [135 mmol/L - 146 mmol/L] (08/13/19 11:48:00)          Potassium Level: 4.1 mmol/L [3.5 mmol/L - 5.3 mmol/L] (08/13/19 11:48:00)          Chloride Level: 107 mmol/L [98 mmol/L - 110 mmol/L] (08/13/19 11:48:00)          CO2 Level: 24 mmol/L [20 mmol/L - 32 mmol/L] (08/13/19 11:48:00)          Glucose Level: 92 mg/dL [65 mg/dL - 99 mg/dL] (08/13/19 11:48:00)          BUN: 10 mg/dL [7 mg/dL - 25 mg/dL] (08/13/19 11:48:00)          Creatinine Level: 1 mg/dL [0.5 mg/dL - 1.1 mg/dL] (08/13/19 11:48:00)          BUN/Creat Ratio: NOT APPLICABLE [6  - 22] (08/13/19 11:48:00)          eGFR: 78 mL/min/1.73m2 (08/13/19 11:48:00)          eGFR African American: 90 mL/min/1.73m2 (08/13/19 11:48:00)          Calcium Level: 9.4 mg/dL [8.6 mg/dL - 10.2 mg/dL] (08/13/19 11:48:00)          TSH: 2.67 mIU/L (08/13/19 11:48:00)          Lithium Level: 0.6 mmol/L [0.6 mmol/L - 1.2 mmol/L] (08/13/19 11:48:00)

## 2022-02-16 NOTE — LETTER
(Inserted Image. Unable to display)   August 14, 2019        JACLYN SARMIENTO      [address], [place] [id number]        Dear JACLYN,    Thank you for choosing [facility] for your healthcare needs. Below you will find the results of your recent test(s) done at our clinic.      Your thyroid is looking good.   Your other labs are OK      Result Name Current Result Previous Result Reference Range   Sodium Level (mmol/L)  139 8/13/2019  136 6/18/2019 135 - 146   Potassium Level (mmol/L)  4.1 8/13/2019  4.0 6/18/2019 3.5 - 5.3   Chloride Level (mmol/L)  107 8/13/2019  106 6/18/2019 98 - 110   CO2 Level (mmol/L)  24 8/13/2019  21 6/18/2019 20 - 32   Glucose Level (mg/dL)  92 8/13/2019  89 6/18/2019 65 - 99   BUN (mg/dL)  10 8/13/2019  12 6/18/2019 7 - 25   Creatinine Level (mg/dL)  1.00 8/13/2019  0.96 6/18/2019 0.50 - 1.10   eGFR (mL/min/1.73m2)  78 8/13/2019  82 6/18/2019 > OR = 60 -    eGFR  (mL/min/1.73m2)  90 8/13/2019  95 6/18/2019 > OR = 60 -    Calcium Level (mg/dL)  9.4 8/13/2019  9.3 6/18/2019 8.6 - 10.2   TSH (mIU/L)  2.67 8/13/2019 ((H)) 4.99 6/18/2019    Lithium Level (mmol/L)  0.6 8/13/2019  0.6 6/18/2019 0.6 - 1.2       Please contact me or my assistant at [phone] if you have any questions or concerns.     Sincerely,        Marco A Craig MD        What do your labs mean?  Below is a glossary of commonly ordered labs:  LDL   Bad Cholesterol   HDL   Good Cholesterol  AST/ALT   Liver Function   Cr/Creatinine   Kidney Function  Microalbumin   Kidney Function  BUN   Kidney Function  PSA   Prostate    TSH   Thyroid Hormone  HgbA1c   Diabetes Test   Hgb (Hemoglobin)   Red Blood Cells

## 2022-02-16 NOTE — TELEPHONE ENCOUNTER
---------------------  From: Raúl GODWIN, Ruma OROZCO (Phone Messages Pool ([id number]_[facility]))   To: SYDNI Message Pool ([id number]_[place] [facility] [place]);     Sent: 11/14/2019 2:03:43 PM CST  Subject: Med refill     Phone message    PCP:   SYDNI      Time of Call:  1359       Person Calling:  Pt  Phone number:  [phone]    Returned call at: _    Note:   [facility] Tizanidine and was told it was not available. She is assuming because she had to get the last refill from a [facility].    Patient is requesting new refill be sent in to [facility] in .    Please advise.Pt called and informed that this has been fixed

## 2022-02-16 NOTE — TELEPHONE ENCOUNTER
---------------------  From: Vanessa MURRAY, Isabella GILLESPIE   To: Referral Coordinators Pool ([id number]_[facility]);     Sent: 3/20/2019 12:59:51 PM CDT  Subject: Referral to Counseler      We saw patient today, can someone call her tomorrow or the next day and help her to get set up with a counseler near here.     Thank you    Theresa DUMONT CMA

## 2022-02-16 NOTE — NURSING NOTE
CAGE Assessment Entered On:  9/11/2019 1:55 PM CDT    Performed On:  9/10/2019 1:55 PM CDT by Luzma Jauregui               Assessment   Have you ever felt you should cut down on your drinking :   No   Have people annoyed you by criticizing your drinking :   No   Have you ever felt bad or guilty about your drinking :   No   Have you ever taken a drink first thing in the morning to steady your nerves or get rid of a hangover (Eye-opener) :   No   CAGE Score :   0    Luzma Jauregui - 9/11/2019 1:55 PM CDT

## 2022-02-16 NOTE — TELEPHONE ENCOUNTER
---------------------  From: Anisa BARILLASMigdalia   Sent: 7/22/2019 4:51:31 PM CDT  Subject: Keo GOFF     Approved today. PA # [id number]. LM for Leslye. Also LM for pt to call back. She needs to be aware that in order for her to remain on this, she'll need to lose 5% of her body weight (about 14.25 lbs) within 6 months or insurance will not approve again.Pt LM at 5:05pm. Returned call and pt informed of above.

## 2022-02-16 NOTE — TELEPHONE ENCOUNTER
---------------------  From: Aniya Goode RN (Phone Messages Pool ([id number]_[facility]))   To: [facility] Message Pool ([id number]_[place] [facility] [place]);     Sent: 5/22/2019 5:27:22 PM CDT  Subject: KERA     Phone Message    PCP:   SYDNI      Time of Call:  1703       Person Calling:  Ashley - mom  Phone number:  [phone]    Returned call at: _    Note:   Patient's mom called wanting to make SYDNI aware of what is going on with the patient. She states that Dilia was in to see SYDNI yesterday and has since taken 18 of her antidepressant pills. She states she is very concerned about her usage, her safety, and pt's brother's safety (pt is living with her brother). she states she sounds drunk when talking to her on the phone and she is having memory issues (ex - not remembering there was a pizza in the oven).   Pt's mom lives in [place] and is not able to help patient directly. She is calling to make SYDNI aware for future reference.     Last office visit and reason:  5-21-19 Med Check w/SYDNI---------------------  From: Missy Tran CMA ([facility] Message Pool ([id number]_[facility]))   To: Marco A Craig MD;     Sent: 5/23/2019 9:52:53 AM CDT  Subject: FW: KERA     Called and spoke with Dilia at 9:48am. I called her and informed her we had received a message from someone with some concerns about her and that I wanted to just call and check in on her. I asked if she is doing good and having any problems with her medications. She informed me she is feeling fine and has not had any issues since we saw her. She was easy to understand on the phone and answered questions appropriately.

## 2022-02-16 NOTE — PROGRESS NOTES
Chief Complaint    f/u Medication. Leg is doing better. Still swollen but not as bad  History of Present Illness      24-year-old here following up on both her mood disorder and a DVT.  Mood disorder is been stable to slowly improving.  She feels like she has been more forward-looking and has done some things that challenge to but has succeeded in advancing a little bit.  She describes her family is being unhappy with her advancement and they have requested she moved home with her mother in [place].  Patient says she would like to stay because she is just getting comfortable here but says she is not paying the bills does not own the homes so she will acquiesce to her family's request       Patient also had a DVT went up into the more proximal femoral vein.  She has been on Xarelto for 21 days it twice a day dosing.  Says she still having some swelling in the leg but it is much better than it was she says her some discomfort but not a lot of pain now when she walks she denies any problems breathing  Review of Systems      See HPI.  All other review of systems negative.  Physical Exam   Vitals & Measurements    HR: 72(Peripheral)  BP: 122/70     HT: 68 in  WT: 288 lb  BMI: 43.79       2+ edema of the lower extremity with good distal pulses and color good range of motion of her knee and ankle       Patient is cooperative alert normal cognition no tremors       No tics or grimaces       Normal respiratory effort  Assessment/Plan       1. Bipolar disorder (F31.9)         Regimen is been stable she continue on her present meds says she has enough medication to get to her appointments in [place] and has physicians there she now she will see and including a counselor       2. DVT of deep femoral vein (I82.419)        We will switch her medication to Xarelto 20 a day she will be seen by a physician when she gets a dilute and consideration of a follow-up ultrasound        Orders:         ALPRAZolam, = 1 tab(s) ( 1 mg ), PO,  q6-8 hrs, PRN: for anxiety, # 30 tab(s), 0 Refill(s), Type: Hard Stop, Pharmacy: [facility] #[id number], (Completed)         ALPRAZolam, = 1 tab(s) ( 1 mg ), PO, q6-8 hrs, PRN: for anxiety, # 30 tab(s), 2 Refill(s), Type: Maintenance, Pharmacy: [facility] #[id number], 1 tab(s) Oral q6-8 hrs,PRN:for anxiety, (Ordered)         lithium, See Instructions, Instructions: TAKE 2 CAPSULES BY MOUTH EVERY MORNING, 1 CAPSULE IN THE AFTERNOON, AND 1 CAPSULE AT NIGHT, # 120 cap(s), 0 Refill(s), Type: Hard Stop, Pharmacy: [facility] #[id number], (Completed)         lithium, See Instructions, Instructions: TAKE 2 CAPSULES BY MOUTH EVERY MORNING, 1 CAPSULE IN THE AFTERNOON, AND 1 CAPSULE AT NIGHT, # 120 cap(s), 3 Refill(s), Type: Maintenance, Pharmacy: [facility] #[id number], TAKE 2 CAPSULES BY MOUTH EVERY MORNING, 1..., (Ordered)         QUEtiapine, = 1 tab(s), Oral, qhs, # 90 tab(s), 1 Refill(s), Type: Maintenance, Pharmacy: [facility] #[id number], 1 tab(s) Oral qhs,x90 day(s), (Ordered)         QUEtiapine, = 1 tab(s), Oral, qhs, # 30 tab(s), 0 Refill(s), Type: Hard Stop, Pharmacy: [facility] #[id number], (Completed)         rivaroxaban, = 1 tab(s) ( 20 mg ), Oral, qpm, # 30 tab(s), 5 Refill(s), Type: Maintenance, Pharmacy: [facility] #[id number], 1 tab(s) Oral qpm, (Ordered)         rivaroxaban, = 1 tab(s) ( 15 mg ), Oral, bid, Instructions: proximal DVT, # 42 tab(s), 0 Refill(s), Type: Maintenance, Pharmacy: [facility] #[id number], 1 tab(s) Oral bid,x21 day(s),Instr:proximal DVT, (Completed)  Patient Information     Name:JACLYN SARMIENTO      Address:      [address] [place] [id number]     Sex:Female     YOB: 1995     [phone]     Emergency Contact:EVERTON BROWN VERNA     MRN:[id number]     FIN:[id number]     Location:[facility]     Date of Service:10/08/2019      Primary Care Physician:       NONE ,       Attending Physician:        Rafa HAM, Marco A, [phone]  Problem List/Past Medical History    Ongoing     Bipolar disorder     Depression     JENNY (generalized anxiety disorder)     History of asthma     Hypertension     Morbid obesity     Tobacco user    Historical     No qualifying data  Procedure/Surgical History     Appendectomy (2009)     Myringotomy and insertion of T tube  Medications    ALPRAZolam 1 mg oral tablet, 1 mg= 1 tab(s), Oral, q6-8 hrs, PRN, 2 refills    citalopram 40 mg oral tablet, 40 mg= 1 tab(s), Oral, daily, 1 refills    lisinopril 20 mg oral tablet, 1 tab(s), Oral, daily, 3 refills    lithium 300 mg oral capsule, See Instructions, 3 refills    propranolol 60 mg oral tablet, 1 tab(s), Oral, tid    QUEtiapine 200 mg oral tablet, 1 tab(s), Oral, qhs, 1 refills    Reclipsen 0.15 mg-0.03 mg oral tablet, 1 tab(s), Oral, daily    tiZANidine 4 mg oral tablet, 1 tab(s), Oral, q8 hrs, PRN    Xarelto 20 mg oral tablet, 20 mg= 1 tab(s), Oral, qpm, 5 refills  Allergies    Metamucil (throat swelling, facial swelling)  Social History    Smoking Status - 10/08/2019     Current every day smoker     Alcohol      Current, 1-2 times per month, 02/21/2019     Exercise      Exercise frequency: Never., 02/21/2019     Nutrition/Health      Type of diet: Regular., 02/21/2019     Sexual      Sexually active: No. Identifies as female, Sexual orientation: Lesbian, morales or homosexual. Contraceptive Use Details: Birth control pill., 02/21/2019     Substance Abuse      Current, Marijuana, 02/21/2019     Tobacco      Smoker, current status unknown, Cigarettes, 10 per day., 02/21/2019  Immunizations      Vaccine Date Status      tetanus/diphth/pertuss (Tdap) adult/adol 11/16/2009 Recorded      MMR (measles/mumps/rubella) 10/18/1994 Recorded      MMR (measles/mumps/rubella) 10/18/1994 Recorded      OPV 09/27/1994 Recorded      Hep B 09/27/1994 Recorded      DTaP-Hib 09/27/1994 Recorded      OPV 04/11/1994 Recorded      DTaP-Hib 04/11/1994  Recorded      MMR (measles/mumps/rubella) 04/11/1994 Recorded      OPV 1993 Recorded      DTaP-Hib 1993 Recorded      OPV 1993 Recorded      Hep B 1993 Recorded      DTaP-Hib 1993 Recorded      Hep B 1993 Recorded  Lab Results          Lab Results (Last 4 results within 90 days)           Sodium Level: 139 mmol/L [135 mmol/L - 146 mmol/L] (08/13/19 11:48:00)          Potassium Level: 4.1 mmol/L [3.5 mmol/L - 5.3 mmol/L] (08/13/19 11:48:00)          Chloride Level: 107 mmol/L [98 mmol/L - 110 mmol/L] (08/13/19 11:48:00)          CO2 Level: 24 mmol/L [20 mmol/L - 32 mmol/L] (08/13/19 11:48:00)          Glucose Level: 92 mg/dL [65 mg/dL - 99 mg/dL] (08/13/19 11:48:00)          BUN: 10 mg/dL [7 mg/dL - 25 mg/dL] (08/13/19 11:48:00)          Creatinine Level: 1 mg/dL [0.5 mg/dL - 1.1 mg/dL] (08/13/19 11:48:00)          BUN/Creat Ratio: NOT APPLICABLE [6  - 22] (08/13/19 11:48:00)          eGFR: 78 mL/min/1.73m2 (08/13/19 11:48:00)          eGFR African American: 90 mL/min/1.73m2 (08/13/19 11:48:00)          Calcium Level: 9.4 mg/dL [8.6 mg/dL - 10.2 mg/dL] (08/13/19 11:48:00)          TSH: 2.67 mIU/L (08/13/19 11:48:00)          Lithium Level: 0.6 mmol/L [0.6 mmol/L - 1.2 mmol/L] (08/13/19 11:48:00)

## 2023-10-02 ENCOUNTER — TRANSFERRED RECORDS (OUTPATIENT)
Dept: MULTI SPECIALTY CLINIC | Facility: CLINIC | Age: 28
End: 2023-10-02

## 2023-10-02 LAB
CHOLESTEROL (EXTERNAL): 167 MG/DL (ref 0–200)
HDLC SERPL-MCNC: 42 MG/DL
LDL CHOLESTEROL CALCULATED (EXTERNAL): 57 MG/DL (ref 0–100)
NON HDL CHOLESTEROL (EXTERNAL): 125 MG/DL (ref 0–130)
TRIGLYCERIDES (EXTERNAL): 338 MG/DL

## 2024-04-22 ENCOUNTER — OFFICE VISIT (OUTPATIENT)
Dept: FAMILY MEDICINE | Facility: CLINIC | Age: 29
End: 2024-04-22
Payer: MEDICAID

## 2024-04-22 VITALS
HEART RATE: 83 BPM | TEMPERATURE: 97.7 F | SYSTOLIC BLOOD PRESSURE: 122 MMHG | DIASTOLIC BLOOD PRESSURE: 78 MMHG | WEIGHT: 275.2 LBS | HEIGHT: 68 IN | BODY MASS INDEX: 41.71 KG/M2 | RESPIRATION RATE: 16 BRPM | OXYGEN SATURATION: 97 %

## 2024-04-22 DIAGNOSIS — F64.0 GENDER DYSPHORIA IN ADULT: ICD-10-CM

## 2024-04-22 DIAGNOSIS — Z00.00 ROUTINE GENERAL MEDICAL EXAMINATION AT A HEALTH CARE FACILITY: Primary | ICD-10-CM

## 2024-04-22 DIAGNOSIS — Z78.9 FEMALE-TO-MALE TRANSGENDER PERSON: ICD-10-CM

## 2024-04-22 DIAGNOSIS — E11.9 TYPE 2 DIABETES MELLITUS WITHOUT COMPLICATION, WITHOUT LONG-TERM CURRENT USE OF INSULIN (H): ICD-10-CM

## 2024-04-22 DIAGNOSIS — F41.1 GENERALIZED ANXIETY DISORDER: ICD-10-CM

## 2024-04-22 DIAGNOSIS — F31.9 BIPOLAR AFFECTIVE DISORDER, REMISSION STATUS UNSPECIFIED (H): ICD-10-CM

## 2024-04-22 DIAGNOSIS — E78.2 MIXED HYPERLIPIDEMIA: ICD-10-CM

## 2024-04-22 PROBLEM — F10.920 ALCOHOLIC INTOXICATION WITHOUT COMPLICATION (H): Status: RESOLVED | Noted: 2018-07-15 | Resolved: 2024-04-22

## 2024-04-22 PROBLEM — Z87.09 HISTORY OF ASTHMA: Status: RESOLVED | Noted: 2024-04-22 | Resolved: 2024-04-22

## 2024-04-22 PROBLEM — T42.6X2A: Status: RESOLVED | Noted: 2018-07-15 | Resolved: 2024-04-22

## 2024-04-22 PROBLEM — D68.51 ACTIVATED PROTEIN C RESISTANCE (H): Status: RESOLVED | Noted: 2023-10-19 | Resolved: 2024-04-22

## 2024-04-22 PROCEDURE — 99385 PREV VISIT NEW AGE 18-39: CPT | Performed by: NURSE PRACTITIONER

## 2024-04-22 PROCEDURE — 91320 SARSCV2 VAC 30MCG TRS-SUC IM: CPT | Performed by: NURSE PRACTITIONER

## 2024-04-22 PROCEDURE — 90480 ADMN SARSCOV2 VAC 1/ONLY CMP: CPT | Performed by: NURSE PRACTITIONER

## 2024-04-22 RX ORDER — LEVOCETIRIZINE DIHYDROCHLORIDE 5 MG/1
5 TABLET, FILM COATED ORAL
COMMUNITY
Start: 2023-09-18

## 2024-04-22 RX ORDER — GLIPIZIDE 10 MG/1
10 TABLET ORAL
COMMUNITY
Start: 2023-07-19 | End: 2024-08-06

## 2024-04-22 RX ORDER — ATORVASTATIN CALCIUM 40 MG/1
40 TABLET, FILM COATED ORAL
COMMUNITY
Start: 2023-08-23

## 2024-04-22 RX ORDER — AMOXICILLIN 250 MG
1 CAPSULE ORAL 2 TIMES DAILY
COMMUNITY

## 2024-04-22 RX ORDER — HYDROCORTISONE 2.5 %
CREAM (GRAM) TOPICAL
COMMUNITY

## 2024-04-22 RX ORDER — PROPRANOLOL HYDROCHLORIDE 80 MG/1
80 TABLET ORAL
COMMUNITY

## 2024-04-22 RX ORDER — FAMOTIDINE 20 MG/1
20 TABLET, FILM COATED ORAL
COMMUNITY
Start: 2023-08-23

## 2024-04-22 RX ORDER — BUSPIRONE HYDROCHLORIDE 10 MG/1
TABLET ORAL
COMMUNITY
Start: 2023-12-26

## 2024-04-22 RX ORDER — CODEINE PHOSPHATE AND GUAIFENESIN 10; 100 MG/5ML; MG/5ML
5 SOLUTION ORAL
COMMUNITY
End: 2024-08-20

## 2024-04-22 RX ORDER — MONTELUKAST SODIUM 10 MG/1
10 TABLET ORAL
COMMUNITY

## 2024-04-22 RX ORDER — ALBUTEROL SULFATE 90 UG/1
AEROSOL, METERED RESPIRATORY (INHALATION)
COMMUNITY
Start: 2023-06-21

## 2024-04-22 RX ORDER — FLUTICASONE PROPIONATE AND SALMETEROL 500; 50 UG/1; UG/1
1 POWDER RESPIRATORY (INHALATION)
COMMUNITY
Start: 2023-09-19

## 2024-04-22 RX ORDER — ACETAMINOPHEN 325 MG/1
1000 TABLET ORAL
COMMUNITY

## 2024-04-22 RX ORDER — PRAZOSIN HYDROCHLORIDE 5 MG/1
5 CAPSULE ORAL EVERY EVENING
COMMUNITY

## 2024-04-22 RX ORDER — ALPRAZOLAM 1 MG
TABLET ORAL
COMMUNITY
Start: 2023-10-07

## 2024-04-22 RX ORDER — LAMOTRIGINE 150 MG/1
TABLET ORAL
COMMUNITY
Start: 2023-09-13

## 2024-04-22 RX ORDER — AMITRIPTYLINE HYDROCHLORIDE 150 MG/1
150 TABLET ORAL
COMMUNITY
Start: 2023-09-19

## 2024-04-22 SDOH — HEALTH STABILITY: PHYSICAL HEALTH: ON AVERAGE, HOW MANY MINUTES DO YOU ENGAGE IN EXERCISE AT THIS LEVEL?: 20 MIN

## 2024-04-22 SDOH — HEALTH STABILITY: PHYSICAL HEALTH: ON AVERAGE, HOW MANY DAYS PER WEEK DO YOU ENGAGE IN MODERATE TO STRENUOUS EXERCISE (LIKE A BRISK WALK)?: 2 DAYS

## 2024-04-22 ASSESSMENT — PATIENT HEALTH QUESTIONNAIRE - PHQ9
10. IF YOU CHECKED OFF ANY PROBLEMS, HOW DIFFICULT HAVE THESE PROBLEMS MADE IT FOR YOU TO DO YOUR WORK, TAKE CARE OF THINGS AT HOME, OR GET ALONG WITH OTHER PEOPLE: SOMEWHAT DIFFICULT
SUM OF ALL RESPONSES TO PHQ QUESTIONS 1-9: 10
SUM OF ALL RESPONSES TO PHQ QUESTIONS 1-9: 10

## 2024-04-22 ASSESSMENT — SOCIAL DETERMINANTS OF HEALTH (SDOH): HOW OFTEN DO YOU GET TOGETHER WITH FRIENDS OR RELATIVES?: MORE THAN THREE TIMES A WEEK

## 2024-04-22 NOTE — COMMUNITY RESOURCES LIST (ENGLISH)
April 22, 2024           YOUR PERSONALIZED LIST OF SERVICES & PROGRAMS           NAVIGATION    Eligibility Screening      [facility] - [facility] Support [facility]ists  [address] [place] [address] (Distance: 10.2 miles)  Language: English, South Korean  Fee: Free  Accessibility: Translation services      [facility] application assistance  [address] [place] [address] (Distance: 10.2 miles)  Phone: [phone]  Language: English, South Korean  Fee: Free  Accessibility: Ada accessible, Translation services      [facility] - Emergency Epilepsy Medication  Phone: [phone]  Website: [email or web address]  Language: English  Hours: Mon 11:00 AM - 5:00 PM Tue 11:00 AM - 5:00 PM Wed 11:00 AM - 5:00 PM Thu 11:00 AM - 5:00 PM Fri 11:00 AM - 5:00 PM  Fee: Free, Self pay, Sliding scale  Accessibility: Ada accessible        ASSISTANCE    Nutrition Benefits      [facility]  [address] [place] [address] (Distance: 23.1 miles)  Phone: [phone]  Website: http://[facility].org  Language: English, South Korean  Fee: Free  Accessibility: Ada accessible, Blind accommodation, Deaf or hard of hearing      [facility] application assistance  [address] [place] [address] (Distance: 10.2 miles)  Phone: [phone]  Language: English, South Korean  Fee: Free  Accessibility: Ada accessible, Translation services      - SNAP Eligibility Assistance  Website: [email or web address]://[email or web address].Advisity/  Language: English  Fee: Free    Pantry      [facility] - Food Pantry  [address] [place] [address] (Distance: 1.7 miles)  Website: https://www.Macro[employer]ure/  Language: [facility] - Food Shelf  [address] [place] [address] (Distance: 9.9 miles)  Phone: [phone]  Website: http://[employer]  Language: English  Fee: Free      [facility]  Food Pantry  Phone: [phone]  Website: https://www.[employer]  Language: English, Burmese  Hours: Wed 4:00 PM - 6:00 PM  Fee: Free               IMPORTANT NUMBERS & WEBSITES        Emergency Services  911  .   [facility]  211 http://211unitedway.org  .   Poison Control  ([phone] http://[place]poison.org http://[place]poison.org  .     Suicide and Crisis Lifeline  988 http://988lifeline.org  .   Childhelp [place] Child Abuse Hotline  768.320.8916 http://Childhelphotline.org   .   [place] Sexual Assault Hotline  (106) 614-2390 (HOPE) http://[employer].org   .     [place] Runaway Safeline  (991) 271-9697 (RUNAWAY) http://[employer] Workflow Management  .   Pregnancy & Postpartum Support  Call/text [phone]  [place]: http://ppsupportmn.org  [place]: http://psichapters.com/wi  .   Substance Abuse National Helpline ([facility])  747-262-HELP (8077) http://Findtreatment.gov   .                DISCLAIMER: These resources have been generated via the [employer] Platform. [employer] does not endorse any service providers mentioned in this resource list. [employer] does not guarantee that the services mentioned in this resource list will be available to you or will improve your health or wellness.    [facility]

## 2024-04-22 NOTE — PATIENT INSTRUCTIONS
Preventive Care Advice   This is general advice given by our system to help you stay healthy. However, your care team may have specific advice just for you. Please talk to your care team about your preventive care needs.  Nutrition  Eat 5 or more servings of fruits and vegetables each day.  Try wheat bread, brown rice and whole grain pasta (instead of white bread, rice, and pasta).  Get enough calcium and vitamin D. Check the label on foods and aim for 100% of the RDA (recommended daily allowance).  Lifestyle  Exercise at least 150 minutes each week   (30 minutes a day, 5 days a week).  Do muscle strengthening activities 2 days a week. These help control your weight and prevent disease.  No smoking.  Wear sunscreen to prevent skin cancer.  Have a dental exam and cleaning every 6 months.  Yearly exams  See your health care team every year to talk about:  Any changes in your health.  Any medicines your care team has prescribed.  Preventive care, family planning, and ways to prevent chronic diseases.  Shots (vaccines)   HPV shots (up to age 26), if you've never had them before.  Hepatitis B shots (up to age 59), if you've never had them before.  COVID-19 shot: Get this shot when it's due.  Flu shot: Get a flu shot every year.  Tetanus shot: Get a tetanus shot every 10 years.  Pneumococcal, hepatitis A, and RSV shots: Ask your care team if you need these based on your risk.  Shingles shot (for age 50 and up).  General health tests  Diabetes screening:  Starting at age 35, Get screened for diabetes at least every 3 years.  If you are younger than age 35, ask your care team if you should be screened for diabetes.  Cholesterol test: At age 39, start having a cholesterol test every 5 years, or more often if advised.  Bone density scan (DEXA): At age 50, ask your care team if you should have this scan for osteoporosis (brittle bones).  Hepatitis C: Get tested at least once in your life.  STIs (sexually transmitted  infections)  Before age 24: Ask your care team if you should be screened for STIs.  After age 24: Get screened for STIs if you're at risk. You are at risk for STIs (including HIV) if:  You are sexually active with more than one person.  You don't use condoms every time.  You or a partner was diagnosed with a sexually transmitted infection.  If you are at risk for HIV, ask about PrEP medicine to prevent HIV.  Get tested for HIV at least once in your life, whether you are at risk for HIV or not.  Cancer screening tests  Cervical cancer screening: If you have a cervix, begin getting regular cervical cancer screening tests at age 21. Most people who have regular screenings with normal results can stop after age 65. Talk about this with your provider.  Breast cancer scan (mammogram): If you've ever had breasts, begin having regular mammograms starting at age 40. This is a scan to check for breast cancer.  Colon cancer screening: It is important to start screening for colon cancer at age 45.  Have a colonoscopy test every 10 years (or more often if you're at risk) Or, ask your provider about stool tests like a FIT test every year or Cologuard test every 3 years.  To learn more about your testing options, visit: https://www.[employer]tainment [employer]ises/447990.pdf.  For help making a decision, visit: https://bit.ly/aa56096.  Prostate cancer screening test: If you have a prostate and are age 55 to 69, ask your provider if you would benefit from a yearly prostate cancer screening test.  Lung cancer screening: If you are a current or former smoker age 50 to 80, ask your care team if ongoing lung cancer screenings are right for you.  For informational purposes only. Not to replace the advice of your health care provider. Copyright   2023 [employer]. All rights reserved. Clinically reviewed by the [facility] Transitions Program. [employer] 091616 - REV 01/24.    Learning About Stress  What is stress?     Stress is your  body's response to a hard situation. Your body can have a physical, emotional, or mental response. Stress is a fact of life for most people, and it affects everyone differently. What causes stress for you may not be stressful for someone else.  A lot of things can cause stress. You may feel stress when you go on a job interview, take a test, or run a race. This kind of short-term stress is normal and even useful. It can help you if you need to work hard or react quickly. For example, stress can help you finish an important job on time.  Long-term stress is caused by ongoing stressful situations or events. Examples of long-term stress include long-term health problems, ongoing problems at work, or conflicts in your family. Long-term stress can harm your health.  How does stress affect your health?  When you are stressed, your body responds as though you are in danger. It makes hormones that speed up your heart, make you breathe faster, and give you a burst of energy. This is called the fight-or-flight stress response. If the stress is over quickly, your body goes back to normal and no harm is done.  But if stress happens too often or lasts too long, it can have bad effects. Long-term stress can make you more likely to get sick, and it can make symptoms of some diseases worse. If you tense up when you are stressed, you may develop neck, shoulder, or low back pain. Stress is linked to high blood pressure and heart disease.  Stress also harms your emotional health. It can make you ruffin, tense, or depressed. Your relationships may suffer, and you may not do well at work or school.  What can you do to manage stress?  You can try these things to help manage stress:   Do something active. Exercise or activity can help reduce stress. Walking is a great way to get started. Even everyday activities such as housecleaning or yard work can help.  Try yoga or gayathri chi. These techniques combine exercise and meditation. You may need  some training at first to learn them.  Do something you enjoy. For example, listen to music or go to a movie. Practice your hobby or do volunteer work.  Meditate. This can help you relax, because you are not worrying about what happened before or what may happen in the future.  Do guided imagery. Imagine yourself in any setting that helps you feel calm. You can use online videos, books, or a teacher to guide you.  Do breathing exercises. For example:  From a standing position, bend forward from the waist with your knees slightly bent. Let your arms dangle close to the floor.  Breathe in slowly and deeply as you return to a standing position. Roll up slowly and lift your head last.  Hold your breath for just a few seconds in the standing position.  Breathe out slowly and bend forward from the waist.  Let your feelings out. Talk, laugh, cry, and express anger when you need to. Talking with supportive friends or family, a counselor, or a lisa leader about your feelings is a healthy way to relieve stress. Avoid discussing your feelings with people who make you feel worse.  Write. It may help to write about things that are bothering you. This helps you find out how much stress you feel and what is causing it. When you know this, you can find better ways to cope.  What can you do to prevent stress?  You might try some of these things to help prevent stress:  Manage your time. This helps you find time to do the things you want and need to do.  Get enough sleep. Your body recovers from the stresses of the day while you are sleeping.  Get support. Your family, friends, and community can make a difference in how you experience stress.  Limit your news feed. Avoid or limit time on social media or news that may make you feel stressed.  Do something active. Exercise or activity can help reduce stress. Walking is a great way to get started.  Where can you learn more?  Go to https://www.healthwise.net/patiented  Enter N032 in the  "search box to learn more about \"Learning About Stress.\"  Current as of: October 24, 2023               Content Version: 14.0    1320-0367 [employer].   Care instructions adapted under license by your healthcare professional. If you have questions about a medical condition or this instruction, always ask your healthcare professional. [employer] disclaims any warranty or liability for your use of this information.      Learning About Depression Screening  What is depression screening?  Depression screening is a way to see if you have depression symptoms. It may be done by a doctor or counselor. It's often part of a routine checkup. That's because your mental health is just as important as your physical health.  Depression is a mental health condition that affects how you feel, think, and act. You may:  Have less energy.  Lose interest in your daily activities.  Feel sad and grouchy for a long time.  Depression is very common. It affects people of all ages.  Many things can lead to depression. Some people become depressed after they have a stroke or find out they have a major illness like cancer or heart disease. The death of a loved one or a breakup may lead to depression. It can run in families. Most experts believe that a combination of inherited genes and stressful life events can cause it.  What happens during screening?  You may be asked to fill out a form about your depression symptoms. You and the doctor will discuss your answers. The doctor may ask you more questions to learn more about how you think, act, and feel.  What happens after screening?  If you have symptoms of depression, your doctor will talk to you about your options.  Doctors usually treat depression with medicines or counseling. Often, combining the two works best. Many people don't get help because they think that they'll get over the depression on their own. But people with depression may not get better unless they " "get treatment.  The cause of depression is not well understood. There may be many factors involved. But if you have depression, it's not your fault.  A serious symptom of depression is thinking about death or suicide. If you or someone you care about talks about this or about feeling hopeless, get help right away.  It's important to know that depression can be treated. Medicine, counseling, and self-care may help.  Where can you learn more?  Go to https://www.[employer]cademy.net/patiented  Enter T185 in the search box to learn more about \"Learning About Depression Screening.\"  Current as of: June 24, 2023               Content Version: 14.0    1848-7638 [employer].   Care instructions adapted under license by your healthcare professional. If you have questions about a medical condition or this instruction, always ask your healthcare professional. [employer] disclaims any warranty or liability for your use of this information.      Substance Use Disorder: Care Instructions  Overview     You can improve your life and health by stopping your use of alcohol or drugs. When you don't drink or use drugs, you may feel and sleep better. You may get along better with your family, friends, and coworkers. There are medicines and programs that can help with substance use disorder.  How can you care for yourself at home?  Here are some ways to help you stay sober and prevent relapse.  If you have been given medicine to help keep you sober or reduce your cravings, be sure to take it exactly as prescribed.  Talk to your doctor about programs that can help you stop using drugs or drinking alcohol.  Do not keep alcohol or drugs in your home.  Plan ahead. Think about what you'll say if other people ask you to drink or use drugs. Try not to spend time with people who drink or use drugs.  Use the time and money spent on drinking or drugs to do something that's important to you.  Preventing a relapse  Have a plan " to deal with relapse. Learn to recognize changes in your thinking that lead you to drink or use drugs. Get help before you start to drink or use drugs again.  Try to stay away from situations, friends, or places that may lead you to drink or use drugs.  If you feel the need to drink alcohol or use drugs again, seek help right away. Call a trusted friend or family member. Some people get support from organizations such as Narcotics Anonymous or [employer] or from treatment facilities.  If you relapse, get help as soon as you can. Some people make a plan with another person that outlines what they want that person to do for them if they relapse. The plan usually includes how to handle the relapse and who to notify in case of relapse.  Don't give up. Remember that a relapse doesn't mean that you have failed. Use the experience to learn the triggers that lead you to drink or use drugs. Then quit again. Recovery is a lifelong process. Many people have several relapses before they are able to quit for good.  Follow-up care is a key part of your treatment and safety. Be sure to make and go to all appointments, and call your doctor if you are having problems. It's also a good idea to know your test results and keep a list of the medicines you take.  When should you call for help?   Call 911  anytime you think you may need emergency care. For example, call if you or someone else:    Has overdosed or has withdrawal signs. Be sure to tell the emergency workers that you are or someone else is using or trying to quit using drugs. Overdose or withdrawal signs may include:  Losing consciousness.  Seizure.  Seeing or hearing things that aren't there (hallucinations).     Is thinking or talking about suicide or harming others.   Where to get help 24 hours a day, 7 days a week   If you or someone you know talks about suicide, self-harm, a mental health crisis, a substance use crisis, or any other kind of emotional distress, get  "help right away. You can:    Call the Suicide and Crisis Lifeline at 988.     Call 4-889-609-CXRO (1-707.299.5415).     Text HOME to 049541 to access the Crisis Text Line.   Consider saving these numbers in your phone.  Go to [email or web address] for more information or to chat online.  Call your doctor now or seek immediate medical care if:    You are having withdrawal symptoms. These may include nausea or vomiting, sweating, shakiness, and anxiety.   Watch closely for changes in your health, and be sure to contact your doctor if:    You have a relapse.     You need more help or support to stop.   Where can you learn more?  Go to https://www.[employer].net/patiented  Enter H573 in the search box to learn more about \"Substance Use Disorder: Care Instructions.\"  Current as of: November 15, 2023               Content Version: 14.0    9674-2643 Healthwise, [employer].   Care instructions adapted under license by your healthcare professional. If you have questions about a medical condition or this instruction, always ask your healthcare professional. Healthwise, [employer] disclaims any warranty or liability for your use of this information.      "

## 2024-04-22 NOTE — PROGRESS NOTES
"Preventive Care Visit  [facility] JUANY Magallanes CNP, Nurse Practitioner Primary Care  Apr 22, 2024      Assessment & Plan     Routine general medical examination at a health care facility  Routine health maintenance screenings and immunizations discussed.  Reports she had recent health maintenance lab work through [facility].  Plans to continue to follow with psychiatric provider at [facility] for her medications.  Denies any need for refills today.    Gender dysphoria in adult  Patient is interested in starting testosterone therapy.  Brian has  been working with a therapist and also psychiatrist, has done a lot of work over the last year and is ready to proceed with hormone replacement therapy.  Recommend appointment with my colleague, Dr. Durham.    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  - PRIMARY CARE FOLLOW-UP SCHEDULING; Future    Type 2 diabetes mellitus without complication, without long-term current use of insulin (H)  Had recent bariatric surgery, gastric sleeve in January 2024.  Is checking blood sugar and has been able to wean off of glipizide.  Not currently taking Trulicity.  Followed by [facility] provider.  Did offer referral to dietitian as recent weight loss was not as significant as expected, patient defers.  Has an appointment with dietitian externally.    Mixed hyperlipidemia  Reviewed fasting lipid panel from October 2023.  Followed by [facility] provider.    Bipolar affective disorder, remission status unspecified (H)  Followed by psychiatric provider.    Generalized anxiety disorder  Followed by psychiatric provider.              BMI  Estimated body mass index is 41.84 kg/m  as calculated from the following:    Height as of this encounter: 1.727 m (5' 8\").    Weight as of this encounter: 124.8 kg (275 lb 3.2 oz).       Depression Screening Follow Up        4/22/2024     2:05 PM   PHQ   PHQ-9 Total Score 10   Q9: Thoughts of better off dead/self-harm past 2 " weeks Several days   F/U: Thoughts of suicide or self-harm No   F/U: Safety concerns No                     Follow Up Actions Taken  Crisis resource information provided in the After Visit Summary  Referred patient back to mental health provider    Discussed the following ways the patient can remain in a safe environment:    Counseling  Appropriate preventive services were discussed with this patient, including applicable screening as appropriate for fall prevention, nutrition, physical activity, Tobacco-use cessation, weight loss and cognition.  Checklist reviewing preventive services available has been given to the patient.  Reviewed patient's diet, addressing concerns and/or questions.   She is at risk for lack of exercise and has been provided with information to increase physical activity for the benefit of her well-being.   The patient was instructed to see the dentist every 6 months.   The patient's PHQ-9 score is consistent with moderate depression. She was provided with information regarding depression.           Subjective   Brian is a 28 year old, presenting for the following:  Physical (Preventative adult visit)        4/22/2024     2:09 PM   Additional Questions   Roomed by TREVOR Reyes        Health Care Directive  Patient does not have a Health Care Directive or Living Will:     Brian is a pleasant 28-year-old patient who identifies as transgender male who recently moved from [place] to [place].  Had bariatric surgery (gastric sleeve) on 1/18/2024.  Had initially lost a lot of weight quite quickly, weight was 280 couple weeks ago, today 275.  Has an appointment with dietitian tomorrow.  Continues to follow with surgical weight loss team.  Initial weight at time of surgery was 303 pounds.  Brian is eating regular meals and gets full quickly.  States he might be snacking too much with the recent moved to [place].  This has been stressful.  He is currently living with his mom.    He does suffer from  anxiety, depression, bipolar 1, OCD and possibly ADHD.  He is followed by Felicitas Mackenzie CNP at [facility] and he plans to continue to follow by telehealth.  Does not need any refills today of psychotropic medication.  Also has a therapist and does group therapy via Zoom every Thursday.    Brian relates he has known he has been transgender his entire life.  Worked with a therapist over the last year and he is ready for testosterone therapy.      Pap smear completed by previous provider, not need needed for 3 years, normal.    History of diabetes-he was able to stop glipizide with recent weight loss.  Has recently stopped Trulicity on his own.  He is checking blood sugar and states readings have been good.                    4/22/2024   General Health   How would you rate your overall physical health? (!) FAIR   Feel stress (tense, anxious, or unable to sleep) Rather much   (!) STRESS CONCERN      4/22/2024   Nutrition   Three or more servings of calcium each day? (!) I DON'T KNOW   Diet: Diabetic   How many servings of fruit and vegetables per day? (!) 0-1   How many sweetened beverages each day? 0-1         4/22/2024   Exercise   Days per week of moderate/strenous exercise 2 days   Average minutes spent exercising at this level 20 min   (!) EXERCISE CONCERN      4/22/2024   Social Factors   Frequency of gathering with friends or relatives More than three times a week   Worry food won't last until get money to buy more Yes   Food not last or not have enough money for food? No   Do you have housing?  Yes   Are you worried about losing your housing? No   Lack of transportation? No   Unable to get utilities (heat,electricity)? No   (!) FOOD SECURITY CONCERN PRESENT      4/22/2024   Dental   Dentist two times every year? (!) NO         4/22/2024   TB Screening   Were you born outside of the [place]? No       Today's PHQ-9 Score:       4/22/2024     2:05 PM   PHQ-9 SCORE   PHQ-9 Total Score MyChart 10  "(Moderate depression)   PHQ-9 Total Score 10         4/22/2024   Substance Use   Alcohol more than 3/day or more than 7/wk No   Do you use any other substances recreationally? (!) XANAX OR OTHER BENZOS     Social History     Tobacco Use    Smoking status: Never    Smokeless tobacco: Never   Vaping Use    Vaping status: Never Used                    4/22/2024   One time HIV Screening   Previous HIV test? I don't know         4/22/2024   STI Screening   New sexual partner(s) since last STI/HIV test? No     History of abnormal Pap smear: NO - age 30- 65 PAP every 3 years recommended             4/22/2024   Contraception/Family Planning   Questions about contraception or family planning No        Reviewed and updated as needed this visit by Provider                          Review of Systems  CONSTITUTIONAL: NEGATIVE for fever, chills, change in weight  INTEGUMENTARY/SKIN: NEGATIVE for worrisome rashes, moles or lesions  EYES: NEGATIVE for vision changes or irritation  ENT/MOUTH: NEGATIVE for ear, mouth and throat problems  RESP: NEGATIVE for significant cough or SOB  BREAST: NEGATIVE for masses, tenderness or discharge  CV: NEGATIVE for chest pain, palpitations or peripheral edema  GI: NEGATIVE for nausea, abdominal pain, heartburn, or change in bowel habits  : NEGATIVE for frequency, dysuria, or hematuria  MUSCULOSKELETAL: NEGATIVE for significant arthralgias or myalgia  NEURO: NEGATIVE for weakness, dizziness or paresthesias  ENDOCRINE: NEGATIVE for temperature intolerance, skin/hair changes  HEME: NEGATIVE for bleeding problems  PSYCHIATRIC: NEGATIVE for changes in mood or affect     Objective    Exam  /78 (BP Location: Right arm, Patient Position: Sitting, Cuff Size: Adult Large)   Pulse 83   Temp 97.7  F (36.5  C) (Tympanic)   Resp 16   Ht 1.727 m (5' 8\")   Wt 124.8 kg (275 lb 3.2 oz)   SpO2 97%   BMI 41.84 kg/m     Estimated body mass index is 41.84 kg/m  as calculated from the following:    Height " "as of this encounter: 1.727 m (5' 8\").    Weight as of this encounter: 124.8 kg (275 lb 3.2 oz).    Physical Exam  GENERAL: alert and no distress, obese  EYES: Eyes grossly normal to inspection, PERRL and conjunctivae and sclerae normal  HENT: ear canals and TM's normal, nose and mouth without ulcers or lesions  NECK: no adenopathy, no asymmetry, masses, or scars  RESP: lungs clear to auscultation - no rales, rhonchi or wheezes  CV: regular rate and rhythm, normal S1 S2, no S3 or S4, no murmur, click or rub, no peripheral edema  ABDOMEN: soft, nontender, no hepatosplenomegaly, no masses and bowel sounds normal  Laparoscopic surgical sites approximated and without signs of infection.  MS: no gross musculoskeletal defects noted, no edema  SKIN: no suspicious lesions or rashes  NEURO: Normal strength and tone, mentation intact and speech normal  PSYCH: mentation appears normal, affect normal/bright  Breast exam deferred.      Signed Electronically by: JUANY Diaz CNP    Answers submitted by the patient for this visit:  Patient Health Questionnaire (Submitted on 4/22/2024)  If you checked off any problems, how difficult have these problems made it for you to do your work, take care of things at home, or get along with other people?: Somewhat difficult  PHQ9 TOTAL SCORE: 10    "

## 2024-05-06 ENCOUNTER — OFFICE VISIT (OUTPATIENT)
Dept: FAMILY MEDICINE | Facility: CLINIC | Age: 29
End: 2024-05-06
Payer: MEDICAID

## 2024-05-06 VITALS
WEIGHT: 271.1 LBS | HEIGHT: 68 IN | BODY MASS INDEX: 41.09 KG/M2 | RESPIRATION RATE: 16 BRPM | SYSTOLIC BLOOD PRESSURE: 113 MMHG | HEART RATE: 78 BPM | DIASTOLIC BLOOD PRESSURE: 82 MMHG | OXYGEN SATURATION: 95 % | TEMPERATURE: 97.5 F

## 2024-05-06 DIAGNOSIS — E11.9 TYPE 2 DIABETES MELLITUS WITHOUT COMPLICATION, WITHOUT LONG-TERM CURRENT USE OF INSULIN (H): ICD-10-CM

## 2024-05-06 DIAGNOSIS — F64.0 GENDER DYSPHORIA IN ADULT: Primary | ICD-10-CM

## 2024-05-06 DIAGNOSIS — Z23 NEED FOR VACCINATION: ICD-10-CM

## 2024-05-06 DIAGNOSIS — Z51.81 ENCOUNTER FOR THERAPEUTIC DRUG MONITORING: ICD-10-CM

## 2024-05-06 LAB
BASOPHILS # BLD AUTO: 0.1 10E3/UL (ref 0–0.2)
BASOPHILS NFR BLD AUTO: 1 %
EOSINOPHIL # BLD AUTO: 0.2 10E3/UL (ref 0–0.7)
EOSINOPHIL NFR BLD AUTO: 2 %
ERYTHROCYTE [DISTWIDTH] IN BLOOD BY AUTOMATED COUNT: 13.6 % (ref 10–15)
HBA1C MFR BLD: 7 % (ref 0–5.6)
HCT VFR BLD AUTO: 48 % (ref 35–53)
HGB BLD-MCNC: 15.4 G/DL (ref 11.7–17.7)
IMM GRANULOCYTES # BLD: 0 10E3/UL
IMM GRANULOCYTES NFR BLD: 0 %
LYMPHOCYTES # BLD AUTO: 3.8 10E3/UL (ref 0.8–5.3)
LYMPHOCYTES NFR BLD AUTO: 35 %
MCH RBC QN AUTO: 29.4 PG (ref 26.5–33)
MCHC RBC AUTO-ENTMCNC: 32.1 G/DL (ref 31.5–36.5)
MCV RBC AUTO: 92 FL (ref 78–100)
MONOCYTES # BLD AUTO: 0.5 10E3/UL (ref 0–1.3)
MONOCYTES NFR BLD AUTO: 5 %
NEUTROPHILS # BLD AUTO: 6.1 10E3/UL (ref 1.6–8.3)
NEUTROPHILS NFR BLD AUTO: 57 %
PLATELET # BLD AUTO: 272 10E3/UL (ref 150–450)
RBC # BLD AUTO: 5.23 10E6/UL (ref 3.8–5.9)
WBC # BLD AUTO: 10.7 10E3/UL (ref 4–11)

## 2024-05-06 PROCEDURE — G2211 COMPLEX E/M VISIT ADD ON: HCPCS | Performed by: FAMILY MEDICINE

## 2024-05-06 PROCEDURE — 84270 ASSAY OF SEX HORMONE GLOBUL: CPT | Performed by: FAMILY MEDICINE

## 2024-05-06 PROCEDURE — 80053 COMPREHEN METABOLIC PANEL: CPT | Performed by: FAMILY MEDICINE

## 2024-05-06 PROCEDURE — 83036 HEMOGLOBIN GLYCOSYLATED A1C: CPT | Performed by: FAMILY MEDICINE

## 2024-05-06 PROCEDURE — 80061 LIPID PANEL: CPT | Performed by: FAMILY MEDICINE

## 2024-05-06 PROCEDURE — 99215 OFFICE O/P EST HI 40 MIN: CPT | Performed by: FAMILY MEDICINE

## 2024-05-06 PROCEDURE — 82248 BILIRUBIN DIRECT: CPT | Performed by: FAMILY MEDICINE

## 2024-05-06 PROCEDURE — 36415 COLL VENOUS BLD VENIPUNCTURE: CPT | Performed by: FAMILY MEDICINE

## 2024-05-06 PROCEDURE — 90471 IMMUNIZATION ADMIN: CPT | Mod: SL | Performed by: FAMILY MEDICINE

## 2024-05-06 PROCEDURE — 85025 COMPLETE CBC W/AUTO DIFF WBC: CPT | Mod: QW | Performed by: FAMILY MEDICINE

## 2024-05-06 PROCEDURE — 84403 ASSAY OF TOTAL TESTOSTERONE: CPT | Performed by: FAMILY MEDICINE

## 2024-05-06 PROCEDURE — 90677 PCV20 VACCINE IM: CPT | Performed by: FAMILY MEDICINE

## 2024-05-06 RX ORDER — TESTOSTERONE CYPIONATE 200 MG/ML
50 INJECTION, SOLUTION INTRAMUSCULAR WEEKLY
Qty: 2 ML | Refills: 0 | Status: SHIPPED | OUTPATIENT
Start: 2024-05-06 | End: 2024-05-06

## 2024-05-06 RX ORDER — CONTAINER,EMPTY
1 EACH MISCELLANEOUS
Qty: 1 EACH | Refills: 3 | Status: SHIPPED | OUTPATIENT
Start: 2024-05-06

## 2024-05-06 RX ORDER — TESTOSTERONE CYPIONATE 200 MG/ML
50 INJECTION, SOLUTION INTRAMUSCULAR
Qty: 6 ML | Refills: 0 | Status: SHIPPED | OUTPATIENT
Start: 2024-05-06 | End: 2024-08-06

## 2024-05-06 ASSESSMENT — ANXIETY QUESTIONNAIRES
1. FEELING NERVOUS, ANXIOUS, OR ON EDGE: MORE THAN HALF THE DAYS
2. NOT BEING ABLE TO STOP OR CONTROL WORRYING: SEVERAL DAYS
5. BEING SO RESTLESS THAT IT IS HARD TO SIT STILL: MORE THAN HALF THE DAYS
GAD7 TOTAL SCORE: 10
6. BECOMING EASILY ANNOYED OR IRRITABLE: SEVERAL DAYS
7. FEELING AFRAID AS IF SOMETHING AWFUL MIGHT HAPPEN: SEVERAL DAYS
IF YOU CHECKED OFF ANY PROBLEMS ON THIS QUESTIONNAIRE, HOW DIFFICULT HAVE THESE PROBLEMS MADE IT FOR YOU TO DO YOUR WORK, TAKE CARE OF THINGS AT HOME, OR GET ALONG WITH OTHER PEOPLE: SOMEWHAT DIFFICULT
GAD7 TOTAL SCORE: 10
3. WORRYING TOO MUCH ABOUT DIFFERENT THINGS: SEVERAL DAYS
IF YOU CHECKED OFF ANY PROBLEMS ON THIS QUESTIONNAIRE, HOW DIFFICULT HAVE THESE PROBLEMS MADE IT FOR YOU TO DO YOUR WORK, TAKE CARE OF THINGS AT HOME, OR GET ALONG WITH OTHER PEOPLE: SOMEWHAT DIFFICULT

## 2024-05-06 ASSESSMENT — PATIENT HEALTH QUESTIONNAIRE - PHQ9
SUM OF ALL RESPONSES TO PHQ QUESTIONS 1-9: 13
5. POOR APPETITE OR OVEREATING: MORE THAN HALF THE DAYS

## 2024-05-06 NOTE — LETTER
May 21, 2024      Brian Shirley  [address] [address]        Dear ,    We are writing to inform you of your test results.    Let's talk about your labs when you come in to see me in August.  You are welcome to follow up sooner if you would like to.     Resulted Orders   Hepatic Panel   Result Value Ref Range    Protein Total 7.6 6.4 - 8.3 g/dL    Albumin 4.5 3.5 - 5.2 g/dL    Bilirubin Total 0.2 <=1.2 mg/dL    Alkaline Phosphatase 86 40 - 150 U/L      Comment:      Female:    0-15 days     U/L  15d-1 year   122-469 U/L  1-10 years   142-335 U/L  10-13 years  129-417 U/L  13-15 years   U/L  15-17 years   U/L  17-19 years  45-87 U/L  19 years and older   U/L      Male:  0-15 days     U/L  15d-1 year   122-469 U/L  1-10 years   142-335 U/L  10-13 years  129-417 U/L  13-15 years  116-468 U/L  15-17 years   U/L  17-19 years   U/L  19 years and older   U/L    Reference intervals for this test were updated on 11/14/2023 to more accurately reflect our healthy population. There may be differences in the flagging of prior results with similar values performed with this method. Interpretation of those prior results can be made in the context of the updated reference intervals.    AST 31 0 - 45 U/L      Comment:      Reference intervals for this test were updated on 6/12/2023 to more accurately reflect our healthy population. There may be differences in the flagging of prior results with similar values performed with this method. Interpretation of those prior results can be made in the context of the updated reference intervals.    ALT 35 0 - 70 U/L      Comment:      Female   All ages       0-50 U/L     Male   0-20 Years     0-50 U/L  20-Unsp. Years 0-70 U/L      Reference intervals for this test were updated on 6/12/2023 to more accurately reflect our healthy population. There may be differences in the flagging of prior results with similar values performed  "with this method. Interpretation of those prior results can be made in the context of the updated reference intervals.      Bilirubin Direct <0.20 0.00 - 0.30 mg/dL    Narrative    The generation of reference intervals for this test is currently based on binary male or female sex. If the electronic health record information indicates another gender identity or if Legal Sex is recorded as \"Unknown\", both male and female reference intervals are provided where applicable, and should be considered according to the individual's appropriate clinical context.   Glucose   Result Value Ref Range    Glucose 120 (H) 70 - 99 mg/dL    Patient Fasting > 8hrs? Yes    Hemoglobin A1c   Result Value Ref Range    Hemoglobin A1C 7.0 (H) 0.0 - 5.6 %      Comment:      Normal <5.7%   Prediabetes 5.7-6.4%    Diabetes 6.5% or higher     Note: Adopted from ADA consensus guidelines.   Basic metabolic panel   Result Value Ref Range    Sodium 140 135 - 145 mmol/L      Comment:      Reference intervals for this test were updated on 09/26/2023 to more accurately reflect our healthy population. There may be differences in the flagging of prior results with similar values performed with this method. Interpretation of those prior results can be made in the context of the updated reference intervals.     Potassium 4.1 3.4 - 5.3 mmol/L    Chloride 105 98 - 107 mmol/L    Carbon Dioxide (CO2) 25 22 - 29 mmol/L    Anion Gap 10 7 - 15 mmol/L    Urea Nitrogen 9.2 6.0 - 20.0 mg/dL    Creatinine 0.90 0.51 - 1.17 mg/dL      Comment:      Male and Female  0-2 Months    0.31-0.88 mg/dL  2-12 Months   0.16-0.39 mg/dL  1-2 Years     0.18-0.35 mg/dL  3-4 Years     0.26-0.42 mg/dL  5-6 Years     0.29-0.47 mg/dL  7-8 Years     0.34-0.53 mg/dL  9-10 Years    0.33-0.64 mg/dL  11-12 Years   0.44-0.68 mg/dL  13-14 Years   0.46-0.77 mg/dL    Female  15 Years and older  0.51-0.95 mg/dL    Male  15 Years and older  0.67-1.17 mg/dL        GFR Estimate 88 >60 mL/min/1.73m2 " "     Comment:      The generation of the estimated GFR is currently based on binary male or female sex. If the electronic health record information indicates another gender identity or if Legal Sex is recorded as \"Unknown\", GFR estimates are not automatically calculated, and application of GFR equations or a direct GFR measurement should be considered according to the individual's appropriate clinical context.    Calcium 9.6 8.6 - 10.0 mg/dL    Glucose 120 (H) 70 - 99 mg/dL    Narrative    The generation of reference intervals for this test is currently based on binary male or female sex. If the electronic health record information indicates another gender identity or if Legal Sex is recorded as \"Unknown\", both male and female reference intervals are provided where applicable, and should be considered according to the individual's appropriate clinical context.   Lipid panel reflex to direct LDL Fasting   Result Value Ref Range    Cholesterol 257 (H) <200 mg/dL    Triglycerides 309 (H) <150 mg/dL    Direct Measure HDL 52 >=40 mg/dL    LDL Cholesterol Calculated 143 (H) <=100 mg/dL    Non HDL Cholesterol 205 (H) <130 mg/dL    Patient Fasting > 8hrs? Yes     Narrative    Cholesterol  Desirable:  <200 mg/dL    Triglycerides  Normal:  Less than 150 mg/dL  Borderline High:  150-199 mg/dL  High:  200-499 mg/dL  Very High:  Greater than or equal to 500 mg/dL    Direct Measure HDL  Female:  Greater than or equal to 50 mg/dL   Male:  Greater than or equal to 40 mg/dL    LDL Cholesterol  Desirable:  <100mg/dL  Above Desirable:  100-129 mg/dL   Borderline High:  130-159 mg/dL   High:  160-189 mg/dL   Very High:  >= 190 mg/dL    Non HDL Cholesterol  Desirable:  130 mg/dL  Above Desirable:  130-159 mg/dL  Borderline High:  160-189 mg/dL  High:  190-219 mg/dL  Very High:  Greater than or equal to 220 mg/dL   CBC with platelets and differential   Result Value Ref Range    WBC Count 10.7 4.0 - 11.0 10e3/uL    RBC Count 5.23 3.80 - " "5.90 10e6/uL      Comment:      Reference Range:                                                     Female 3.80-5.20 10e6/uL                                      Male 4.40-5.90 10e6u/L    Hemoglobin 15.4 11.7 - 17.7 g/dL      Comment:      Reference Range:                                                     Female 11.7-15.7 g/dL                                      Male 13.3-17.7 g/dL    Hematocrit 48.0 35.0 - 53.0 %      Comment:      Reference Range:                                                     Female 35.0-47.0 %                                            Male 40.0-54.0 %    MCV 92 78 - 100 fL    MCH 29.4 26.5 - 33.0 pg    MCHC 32.1 31.5 - 36.5 g/dL    RDW 13.6 10.0 - 15.0 %    Platelet Count 272 150 - 450 10e3/uL    % Neutrophils 57 %    % Lymphocytes 35 %    % Monocytes 5 %    % Eosinophils 2 %    % Basophils 1 %    % Immature Granulocytes 0 %    Absolute Neutrophils 6.1 1.6 - 8.3 10e3/uL    Absolute Lymphocytes 3.8 0.8 - 5.3 10e3/uL    Absolute Monocytes 0.5 0.0 - 1.3 10e3/uL    Absolute Eosinophils 0.2 0.0 - 0.7 10e3/uL    Absolute Basophils 0.1 0.0 - 0.2 10e3/uL    Absolute Immature Granulocytes 0.0 <=0.4 10e3/uL    Narrative    The generation of reference intervals for this test is currently based on binary male or female sex. If the electronic health record information indicates another gender identity or if Legal Sex is recorded as \"Unknown\", both male and female reference intervals are provided where applicable, and should be considered according to the individual's appropriate clinical context.   Sex Hormone Binding Globulin   Result Value Ref Range    Sex Hormone Binding Globulin 36 11 - 135 nmol/L      Comment:      Female Reference Range:  Geoff Stage I:  nmol/L  Geoff Stage II:  nmol/L  Geoff Stage III: 12-98 nmol/L  Geoff Stage IV:  nmol/L  Geoff Stage V:  nmol/L    Male Reference Range:  Geoff Stage I:  nmol/L  Geoff Stage II:  nmol/L  Geoff Stage " "III:  nmol/L  Geoff Stage IV: 11-60 nmol/L  Geoff Stage V: 11-71 nmol/L    Narrative    The generation of reference intervals for this test is currently based on binary male or female sex. If the electronic health record information indicates another gender identity or if Legal Sex is recorded as \"Unknown\", both male and female reference intervals are provided where applicable, and should be considered according to the individual's appropriate clinical context.   Testosterone Free and Total   Result Value Ref Range    Free Testosterone Calculated 0.32 ng/dL    Testosterone Total 19 8 - 950 ng/dL      Comment:        MALE:  0 to 5 months:  ng/dL  6 months to 9 years: <2-20 ng/dL  10 to 11 years: <2-130 ng/dL  12 to 13 years: <2-800 ng/dL  14 years: <2-1200 ng/dL  15 to 16 years: 100-1200 ng/dL  17 to 18 years: 300-1200 ng/dL  19 years and older: 240-950 ng/dL    FEMALE:  0 to 5 months: 20-80 ng/dL  6 months to 9 years: <2-20 ng/dL  10 to 11 years: <2-44 ng/dL  12 to 16 years: <2-75 ng/dL  17 to 18 years: 20-75 ng/dL  19 years and older: 8-60 ng/dL    Values by Geoff Stage:  Stage I (prepubertal)  Male: <2 to 20 ng/dL  Female: <2 to 20 ng/dL    Stage II  Male: 8 to 66 ng/dL  Female: <2 to 47 ng/dL    Stage III  Male: 26 to 800 ng/dL  Female: 17 to 75 ng/dL    Stage IV  Male: 85 to 1200 ng/dL  Female: 20 to 75 ng/dL    Stage V (young adult)  Male: 300 to 950 ng/dL  Female: 12 to 60 ng/dL    Puberty onset (transition from Geoff Stage I to Geoff Stage II) occurs for boys at a median age of 11.5 years and for girls at median age of 10.5 years. There is evidence that it may occur up to 1 year  earlier in obese girls and in  girls. For boys there is no definite proven relationship between puberty onset and body weight or ethnic origin. Progression through Geoff stages is variable. Geoff Stage V (young adult) should be reached by age 18.        Narrative    The generation of reference " "intervals for this test is currently based on binary male or female sex. If the electronic health record information indicates another gender identity or if Legal Sex is recorded as \"Unknown\", both male and female reference intervals are provided where applicable, and should be considered according to the individual's appropriate clinical context.  This test was developed and its performance characteristics determined by the [facility],  Special Chemistry Laboratory. It has not been cleared or approved by the FDA. The laboratory is regulated under CLIA as qualified to perform high-complexity testing. This test is used for clinical purposes. It should not be regarded as investigational or for research.       If you have any questions or concerns, please call the clinic at the number listed above.       Sincerely,      Alysha Durham MD                              "

## 2024-05-06 NOTE — PROGRESS NOTES
"  Assessment & Plan   Problem List Items Addressed This Visit    None  Visit Diagnoses       Gender dysphoria in adult    -  Primary    Relevant Medications    testosterone cypionate (DEPOTESTOSTERONE) 200 MG/ML injection    Needle, Disp, 25G X 1-1/2\" MISC    needle, disp, 18G X 1\" MISC    syringe, disposable, 1 ML MISC    Other Relevant Orders    Testosterone total    Hepatic Panel    CBC with Diff Plt    Lipid Cascade    Glucose    CBC with Platelets & Differential    Lipid panel reflex to direct LDL Fasting    Testosterone Free and Total    Hepatic panel (Albumin, ALT, AST, Bili, Alk Phos, TP)    Hemoglobin A1c    Need for vaccination        Type 2 diabetes mellitus without complication, without long-term current use of insulin (H)        Relevant Orders    Hemoglobin A1c    Albumin Random Urine Quantitative with Creat Ratio    Basic metabolic panel               Brian is here today to discuss their gender dysphoria.  They do meet criteria as documented below.  Informed consent obtained.  We did discuss timeline of changes    Morbid obesity with type 2 diabetes patient is currently on Trulicity.  In future may consider Mounjaro.  Getting updated hemoglobin A1c, urine microalbumin and basic metabolic panel.    History of bariatric surgery status post gastric sleeve done in April 2024.        Gender Dysphoria   A marked incongruence between one s experienced/expressed gender and assigned gender, of at least six months  duration, as manifested by the following:    Patient has a marked incongruence between one s experienced/expressed gender and primary and/or secondary sex characteristics        Patient has a strong desire to be rid of one s primary and/or secondary sex characteristics because of a marked incongruence with one s experienced/expressed gender (or in young adolescents, a desire to prevent the development of the anticipated secondary sex characteristics)    Patient has a strong desire for the primary " "and/or secondary sex characteristics of the other gender      Patient has a strong desire to be of the other gender     Patient has a  strong desire to be treated as the other gender     Patient has a strong conviction that one has the typical feelings and reactions of the other gender     Total time spent reviewing chart and preparing for appointment, with patient for appointment, and time spent charting and coordinating care on the day of the appointment in minutes was: 45    The longitudinal plan of care for the diagnosis(es)/condition(s) as documented were addressed during this visit. Due to the added complexity in care, I will continue to support Brian in the subsequent management and with ongoing continuity of care.             BMI  Estimated body mass index is 41.22 kg/m  as calculated from the following:    Height as of this encounter: 1.727 m (5' 8\").    Weight as of this encounter: 123 kg (271 lb 1.6 oz).   Weight management plan: Discussed healthy diet and exercise guidelines          Subjective   Brian is a 28 year old, presenting for the following health issues:  Consult (Consult hormone therapy--transgender care)        5/6/2024    12:48 PM   Additional Questions   Roomed by Nila KIM CMA   Accompanied by Self     HPI     Patient is here to discuss transgender care, start testosterone therapy.  He was recently seen for his preventative care 04/22/2024.            Objective    /82 (BP Location: Right arm, Patient Position: Sitting, Cuff Size: Adult Large)   Pulse 78   Temp 97.5  F (36.4  C)   Resp 16   Ht 1.727 m (5' 8\")   Wt 123 kg (271 lb 1.6 oz)   SpO2 95%   BMI 41.22 kg/m    Body mass index is 41.22 kg/m .  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    Alcoholic intoxication without complication (H24) 07/15/2018    Gabapentin overdose, intentional self-harm, initial encounter (H) 07/15/2018    History of asthma 04/22/2024       Current Outpatient Medications   Medication Sig Dispense Refill    acetaminophen (TYLENOL) 325 MG tablet Take 1,000 mg by mouth      albuterol (PROAIR RESPICLICK) 108 (90 Base) MCG/ACT inhaler Inhale 1-2 puffs into the lungs      albuterol (VENTOLIN HFA) 108 (90 Base) MCG/ACT inhaler INHALE 1 TO 2 PUFFS BY MOUTH EVERY 4 TO 6 HOURS AS NEEDED FOR COUGH OR SHORTNESS OF BREATH OR WHEEZING      ALPRAZolam (XANAX) 1 MG tablet TAKE 1/2 TO 1 TABLET BY MOUTH TWICE DAILY AS NEEDED FOR SEVERE ANXIETY OR PANIC ATTACKS      amitriptyline (ELAVIL) 150 MG tablet Take 150 mg by mouth      atorvastatin (LIPITOR) 40 MG tablet Take 40 mg by mouth      busPIRone (BUSPAR) 10 MG tablet TAKE 1 TO 2 TABLETS BY MOUTH TWICE DAILY AS NEEDED FOR ANXIETY RELATED TO MENSTRUATION      famotidine (PEPCID) 20 MG tablet Take 20 mg by mouth      fluticasone-salmeterol (ADVAIR DISKUS) 500-50 MCG/ACT inhaler Inhale 1 puff into the lungs      glipiZIDE (GLUCOTROL) 10 MG tablet Take 10 mg by mouth      guaiFENesin-codeine (ROBITUSSIN AC) 100-10 MG/5ML solution Take 5 mLs by mouth      hydrocortisone 2.5 % cream Apply  topically two times a day. Apply to affected area: bilateral ear canal for itching      lamoTRIgine (LAMICTAL) 150 MG tablet TAKE 1 TABLET BY MOUTH ONCE DAILY AND 1/2 TABLET IN EVENING      levocetirizine (XYZAL) 5 MG tablet Take 5 mg by mouth      montelukast (SINGULAIR) 10 MG tablet Take 10 mg by mouth      needle, disp, 18G X 1\" MISC Use to draw up hormones once weekly 25 each 3    Needle, Disp, 25G X 1-1/2\" MISC Use once weekly for administering hormone IM 25 each 3    omeprazole (PRILOSEC) 20 MG DR capsule Take 20 mg by " "mouth      prazosin (MINIPRESS) 5 MG capsule Take 5 mg by mouth every evening      propranolol (INDERAL) 80 MG tablet Take 80 mg by mouth      senna-docusate (SENOKOT-S/PERICOLACE) 8.6-50 MG tablet Take 1 tablet by mouth 2 times daily      syringe, disposable, 1 ML MISC Use once weekly to draw up hormones 25 each 3    testosterone cypionate (DEPOTESTOSTERONE) 200 MG/ML injection Inject 0.25 mLs (50 mg) into the muscle once a week 2 mL 0    tiZANidine (ZANAFLEX) 4 MG tablet Take 4 mg by mouth         No family history on file.    Allergies   Allergen Reactions    Psyllium Swelling and Other (See Comments)    Food Itching     Red Jello, ear and throat gets itchy    Benefiber Other (See Comments)     Caused an \"itchy\" feeling in throat and into chest after drinking       History   Smoking Status    Never   Smokeless Tobacco    Never                  Social History     Social History     Socioeconomic History    Marital status: Single     Spouse name: None    Number of children: None    Years of education: None    Highest education level: None   Tobacco Use    Smoking status: Never     Passive exposure: Never    Smokeless tobacco: Never   Vaping Use    Vaping status: Never Used     Social Determinants of Health     Financial Resource Strain: Low Risk  (5/6/2024)    Financial Resource Strain     Within the past 12 months, have you or your family members you live with been unable to get utilities (heat, electricity) when it was really needed?: No   Food Insecurity: Low Risk  (5/6/2024)    Food Insecurity     Within the past 12 months, did you worry that your food would run out before you got money to buy more?: No     Within the past 12 months, did the food you bought just not last and you didn t have money to get more?: No   Recent Concern: Food Insecurity - High Risk (4/22/2024)    Food Insecurity     Within the past 12 months, did you worry that your food would run out before you got money to buy more?: No     Within " "the past 12 months, did the food you bought just not last and you didn t have money to get more?: Yes   Transportation Needs: Low Risk  (5/6/2024)    Transportation Needs     Within the past 12 months, has lack of transportation kept you from medical appointments, getting your medicines, non-medical meetings or appointments, work, or from getting things that you need?: No   Physical Activity: Insufficiently Active (4/22/2024)    Exercise Vital Sign     Days of Exercise per Week: 2 days     Minutes of Exercise per Session: 20 min   Stress: Stress Concern Present (4/22/2024)    [place] [employer] - Occupational Stress Questionnaire     Feeling of Stress : Rather much   Social Connections: Unknown (4/22/2024)    Social Connection and Isolation Panel [NHANES]     Frequency of Social Gatherings with Friends and Family: More than three times a week   Interpersonal Safety: Low Risk  (4/22/2024)    Interpersonal Safety     Do you feel physically and emotionally safe where you currently live?: Yes     Within the past 12 months, have you been hit, slapped, kicked or otherwise physically hurt by someone?: No     Within the past 12 months, have you been humiliated or emotionally abused in other ways by your partner or ex-partner?: No   Housing Stability: Low Risk  (5/6/2024)    Housing Stability     Do you have housing? : Yes     Are you worried about losing your housing?: No            Physical Exam:     Vitals:    05/06/24 1252 05/06/24 1321   BP: 113/82    BP Location: Right arm    Patient Position: Sitting    Cuff Size: Adult Large    Pulse: 78    Resp: 16    Temp: (!) 96.5  F (35.8  C) 97.5  F (36.4  C)   TempSrc: Tympanic    SpO2: 95%    Weight: 123 kg (271 lb 1.6 oz)    Height: 1.727 m (5' 8\")      BMI= Body mass index is 41.22 kg/m .   Appearance: androgenous appearance and dress  GENERAL:: healthy, alert and no distress  Affect: Appropriate/mood-congruent        (This assessment is based on the " 2011 published Standards of Care for the Health of Transsexual, Transgender, and Gender-Nonconforming People, Version 7, by the [employer]gender [employer]. [facility] SOC Guidelines)      .   Follow up:  Follow up in 3 months.    Alysha Durham MD    {

## 2024-05-06 NOTE — PATIENT INSTRUCTIONS
"      Informed Consent   Testosterone Therapy       This form refers to the use of testosterone by persons in the female-to-male spectrum who wish to become more masculine to reduce gender dysphoria and facilitate a more masculine gender presentation. While there are risks associated with taking testosterone, when appropriately prescribed it can greatly improve mental health and quality of life.     Please seek another opinion if you want additional perspective on any aspect of your care.       Masculinizing Effects     1. I understand that testosterone may be prescribed to reduce female physical characteristics and masculinize my body.     2. I understand that the masculinizing effects of testosterone can take several months to a year to become noticeable, that the rate and degree of change can't be predicted, and that changes may not be complete for 2-5 years after I start testosterone.     3. I understand that these changes will likely be permanent even if I stop taking testosterone:    Lower voice pitch (i.e., voice becoming deeper).   Increased growth of hair, with thicker/coarser hairs, on arms, legs, chest, back, and abdomen.   Gradual growth of moustache/facial hair.   Hair loss at the temples and crown of the head, with the possibility of becoming completely bald.   Genital changes may or may not be permanent if testosterone is stopped. These include clitoral growth (typically 1-3 cm) and vaginal dryness.     4. I understand that the following changes are usually not permanent (that is, they will likely reverse if I stop taking testosterone). Although testosterone does not change these features, there are other treatments that may be helpful:    Acne, which may be severe and can cause permanent scarring if not treated.   Fat may redistribute to a more masculine pattern (decreased on buttocks/hips/thighs, increased in abdomen - changing from \"pear shape\" to \"apple shape\").   Increased muscle mass and upper " body strength.   Increased libido (sex drive).   Menstrual periods typically stop within 3-6 months of starting testosterone.   5. I understand that it is not known what the effects of testosterone are on fertility. Fertility is reduced and I may never be able to get pregnant, even if I stop testosterone. On the other hand, even if my period stops, it may still be possible for me to get pregnant, and I am aware of birth control options (if applicable). I have been informed that I can't take testosterone if I am pregnant.     6. I understand that there are some aspects of my body that will not be changed by testosterone:   Breasts may appear slightly smaller due to fat loss, but will not substantially shrink   Although voice pitch will likely drop, other aspects of speech will not become more masculine.       Risks of Testosterone     7. I understand that the medical effects and safety of testosterone are not fully understood, and that there may be long-term risks that are not yet known.     8. I understand that I am strongly advised not to take more testosterone than I am prescribed, as this increases health risks. I have been informed that taking more will not make masculinization happen more quickly or increase the degree of change.     9. I understand that testosterone can cause changes that increase my risk of heart disease, including:     Decreasing good cholesterol (HDL) and increasing bad cholesterol (LDL)   Increasing blood pressure   Increasing deposits of fat around my internal organ    I have been advised that my risks of heart disease are greater if people in my family have had heart disease, if I am overweight, or if I smoke.   I have been advised that heart health checkups, including monitoring of my weight and cholesterol levels, should be done periodically as long as I am taking testosterone.     10. I understand that testosterone can damage the liver, possibly leading to liver disease. I have been  advised that I should be monitored for as long as I am taking testosterone.     11. I understand that testosterone can increase the red blood cells and hemoglobin, and while the increase is usually only to a normal male range (which does not pose health risks), a high increase can cause potentially life-threatening problems such as stroke and heart attack. I have been advised that my blood should be monitored periodically while I am taking testosterone.     12. I understand that taking testosterone can increase my risk for diabetes by decreasing my body's response to insulin, causing weight gain, and increasing deposits of fat around my internal organs. I have been advised that my fasting blood glucose should be monitored periodically while I am taking testosterone.       13. I understand that it is not known if testosterone increases the risk of ovarian, breast, or uterine cancer.     14. I understand that taking testosterone can lead to my cervix and the walls of my vagina becoming more fragile, and that this can lead to tears or abrasions that increase the risk of sexually transmitted infections (including HIV) if I have vaginal sex - no matter what the gender of my partner is. I have been advised that blas discussion with my doctor about my sexual practices can help determine how best to prevent and monitor for sexually transmitted infections.     15. I have been informed that testosterone can cause headaches or migraines. I understand that if I am frequently having headaches or migraines, or the pain is unusually severe, it is recommended that I talk with my health care provider.     16. I understand that testosterone can cause emotional changes, including increased irritability, frustration, and anger. I have been advised that my doctor can assist me in finding resources to explore and cope with these changes.     17. I understand that testosterone will result in changes that will be noticeable by other  people, and that some transgender people in similar circumstances have experienced harassment, discrimination, and violence, while others have lost support of loved ones. I have been advised that my doctor can assist me in finding advocacy and support resources.     Prevention of Medical Complications       18. I agree to take testosterone as prescribed and to tell my doctor if I am not happy with the treatment or am experiencing any problems.     19. I understand that the right dose or type of medication prescribed for me may not be the same as for someone else.     20. I understand that physical examinations and blood tests are needed on a regular basis to check for negative side effects of testosterone.     21. I understand that testosterone can interact with other medication (including other sources of hormones), dietary supplements, herbs, alcohol, and street drugs. I understand that being honest with my doctor about what else I am taking will help prevent medical complications that could be life-threatening. I have been informed that I will continue to get medical care no matter what information I share.     22. I understand that some medical conditions make it dangerous to take testosterone. I agree that I will be checked for risky conditions before the decision to take testosterone is made.     23. I understand that I can choose to stop taking testosterone at any time, and that it is advised that I do this with the help of my doctor to make sure there are no negative reactions to stopping. I understand that my doctor may suggest I reduce or stop taking testosterone if there are severe side effects or health risks that can't be controlled.         My signature below confirms that:       My doctor has talked with me about the benefits and risks of testosterone, the possible or likely consequences of hormone therapy, and potential alternative treatment options.   I understand the risks that may be involved.   I  understand that this form covers known effects and risks and that there may be long-term effects or risks that are not yet known  I have had sufficient opportunity to discuss treatment options with my doctor. All of my questions have been answered to my satisfaction.   I believe I have adequate knowledge on which to base informed consent to the provision of testosterone therapy.     Based on this:   _____ I wish to begin taking testosterone.     _____ I do not wish to begin taking testosterone at this time.         Whatever your current decision is, please talk with your doctor any time you have questions, concerns, or want to re-evaluate your options.         ____________________________________ __________________   Patient Signature     Date           ____________________________________ __________________   Prescribing clinician Signature    Date         Some online resources for transgender health    [place] [facility]gender [facility] Coalition   Home to the [facility] at [address] [place] [address], [phone]. Also has support groups.  http://www.mntranshealth.org/    Center of Excellence for Transgender Health  Increasing access to comprehensive, effective, and affirming healthcare services for trans and gender-variant communities.  http://www.transhealth.[facility].edu/trans?page=brittney-00-05    [facility]   Community-based non-profit committed to advancing the health & wellness of LGBTQ communities through research, education and advocacy. http://www.StrataCloudhealth.org    [facility] Glossary of Transgender Terms  http://www.Restorsea Holdingswayhealth.org/site/DocServer/Handout_7-C_Glossary_of_Gender_and_Transgender_Terms__fi.pdf?swrYW=9092    Safe, gender-neutral public restrooms in the [facility]   http://www.mntranshealth.org//index.php?option=com_content&task=view&id=12&Itemid    Trans Youth Support Network  For people 26 and under who identify as a trans or gender non-conforming person and want to be a part of an  activist organization. Offers peer support, education and community building opportunities.   http://www.transyouthsupportnetwork.org/    Reclaim:  [facility] offers mental and integrative health services for LGBTQ youth and their families.  http://www.reclaim-lgbtyouth.org/    Gender Spectrum, for Trans Youth  Gender Spectrum provides education, training and support to help create a gender sensitive and inclusive environment for all children and teens. http://www.genderspectrum.org/    Ethan s FTM Resource Guide  Information on topics of interest to female-to-male (FTM, F2M) trans men, and their friends and loved ones.  http://ftBonitaSoftuide.org/    Also check out your local 500Indieser resource center!  LGBTQIA Services at [facility]: http://www.[facility]/lgbtqia/  LGBTQ@[facility]: http://www.[facility]/multiculturallife/lgbtq/  GLBTA Programs office at [facility]: https://diversity.[facility]/glbta/          Patient Education Information Sheet   [facility]      You Will Be Able To:    1.  Self inject your medication accurately  2.  Receive the injection in the comfort of your own home    How Do I Get My Medication?    We will enter a prescription for your medication and supplies. They can be picked up at the pharmacy or mailed to your home by the pharmacy. It may come in a 1 ml or 10ml size.        1ml=1cc             10ml is 2 teaspoons    How Do I Store My Medication?    Testosterone and estrogen can be stored at room temperature.   USP ([place] Pharmacopeia) recommends you dispose of the vial after 28 days. Many patients keep it for longer, but how long the medicine stays effective after exposure to air and light is uncertain. Definitely if its many months old, consider discarding it and getting a new vial.    Special Note for Testosterone and Estrogen Injections:    Hormones are in an oil which is thick & can be hard to draw into the syringe.  You will receive two needles  for each hormone injection.   One needle is larger and used to draw the medication into the syringe (18g)   The other is thinner and used for the injection (22 or 23g)    Equipment Needed:    1. Medication Vial   2. Syringe   3. 2 Needles  4. Alcohol swab or Alcohol and cotton balls  5. Band-aid if needed  6. Sharps Disposal - can use laundry detergent tub       Parts of a Syringe    Intramuscular Injection (IM)  The needle passes through the skin and subcutaneous fatty tissue then medication is injected directly into the muscle. Push the syringe straight in, all the way to the hub. Then depress the plunger, waiting a few seconds for the medication to enter the muscle. Then remove the syringe and dispose of it.        Sites for IM Injection (thigh)          Sites for IM Injection (Hip)         Steps for Intramuscular Injection:    Gather Supplies (Double check for correct medication and dosage).  Wash Hands  Choose a site for the injection: Sites include: thigh or buttock  Draw up medication into syringe (don't forget to push out excess air with plunger). You will have two needles. Use the larger needle to draw up the medicine. Then, change the needle to the thinner one for the injection. Carefully recap needle and set syringe aside.  Wipe injection site with alcohol swab  Relax the muscle. Pinch a piece of skin and tissue to inject into  Dart the needle through the skin at a 90 degree angle   Take hold of syringe with free hand to stabilize   Carefully pull back on the plunger slightly (aspirate) and check for blood in the syringe  If there is no blood, push plunger to inject medication  If there is blood, remove the needle, throw away and start over  After injecting the medication, remove the needle and place in sharps container  Use a band-aid to cover the site if desired    Intramuscular Injection Safety Tips:    1. Double check for correct medication and dosage  2. Dart the needle in quickly and to the  hub  3. Remember to aspirate (remove needle if you see blood, discard and start over)  4. Dispose of  used needle and syringe right away in proper sharps container     You Can Do It!    Once you learn the skills, you will be a pro in no time!  If you are having trouble, consider coming to clinic, or going to the shot clinic at:    [place] [facility],   [address]  [place], [phone]  http://www.mntranshealth.org/          Thoughts of self harm?   Trans Lifeline can be reached at [phone].   This service is staffed by trans people 24/7.   For LGBT youth (ages 24 and younger) contemplating suicide, the [employer] Lifeline can be reached at 1-588-4029.       Effects and Expected Time Course of Feminizing Hormones    Effect Expected Onset Expected Maximum Effect   Body Fat redistribution 3-6 months 2-5 years   Decreased Muscle mass 3-6 months 1-2 years   Softening of skin/decreased oiliness 3-6 months Unknown   Decreased Libido 1-3 months 1-2 years   Decreased Spontaneous Erections 1-3 months 3-6 months   Male Sexual Dysfunction Variable Variable   Breast Growth 3-6 months 2-3 years   Decreased Testicular volume 3-6 months 2-3 years   Decreased sperm production Variable Variable   Thinning and slowed growth of body and facial hair  + 6-12 months >3 years   Male pattern baldness No regrowth, loss stops 1-3 months 1-2 years   +complete removal of male facial hair and body hair requires electrolysis, laser treatment or both      Effects and Expected Time Course of Masculinizing  Hormones    Effect Expected Onset Expected Maximum Effect   Skin oiliness/Acne 1-6 months 1-2 years   Facial/body hair growth 3-6 months 3-5 years    Scalp hair loss >12 months+ Variable   Increased muscle mass/strength 6-12 months 2-5 years   Body fat redistribution 3-6 months 2-5 years   Cessation of menses 2-6 months n/a   Clitoral enlargement 3-6 months 1-2 years   Vaginal atrophy 3-6 months 1-2 years    Deepened voice 3-12 months 1-2 years         [facility] [place] is a volunteer organization working to improve the quality of life for those in the transgender and gender-nonconforming community (including non-binary, gender fluid, intersex, etc.), as well as their family, friends, and neighbors in the [facility] ([place]). We will work to educate our community, advocate for trans rights, and offer supportive services and resources for those in need. Website listed below.     https://www.BTC Trip.org/    The [place] Transgender Health Directory is a list of providers from various healthcare specialties who specialize in providing care for those in the transgender and gender-nonconforming community.    https://Orthogem.org/search-directory/    Centers for Disease Control and Prevention has excellent resources for LGBTQ and transgender health     https://www.cdc.gov/lgbthealth/transgender.htm    [facility] Transgender Health Services    https://med.[facility]/sexualhealth/clinic/transgender-health-services       MyC Medical Advice  Open     5/9/2023  [facility] Primo Swartz        I'm Primo, a coordinator at the [facility]. I'm reaching out regarding a referral from Dr. Durham for mental health care. Below is more information about mental health providers that our patients have had positive experiences with. If you have any questions or need more help with services, feel free to respond to this message or call our program at [phone].      Thanks,      Primo           [facility]:  These providers have been specifically recommended by community members as providing safe and positive experiences for transgender/non-binary/gender diverse patients. If you have a negative experience, please tell us so we can remove the provider.  If you have other recommendations, please let us know.  Each practice listed below may have a waitlist and take various types of insurance. If you need letters of support for gender affirming care, providers may require a certain number of sessions to get to know clients before writing a letter.  Please inquire with each individual practice.      Mental Health Providers     [facility]   Coltan [place]  [phone]   [email or web address]    http://www.[employer].Odersun/?      [facility]   Ailyn [place]ema [place]   [phone]   [email or web address]   http://www.YR Free      [facility]   Providers: Don Bo, PhD, [place], [place]   [phone]   https://www.Pharminex/our-staff       [facility]   Providers: Kathryn Moreno   [place], [place]   www.[email or web address].Odersun      Varsha Hamilton [place], [place]   [phone]   https://encouragementclinic.[facility].me/#home      Organizations--[facility]   Multiple Providers   [place]?   [phone]   https://www.sexualhealth.[facility]/clinic-center-sexual-health/transgender-health-services   **waitlist for 25+ currently     [facility]   Providers: Skip Kamara [facility]; Mauricio Parry MA, [facility]; Tonya Fam [facility] & others   [place]   [phone]   http://www.Rezora.Odersun/?      [facility]   Multiple Providers   [place]   [phone]   [email or web address] Chef    http://www.Level Chef/?      [facility]   All Ages   [place], [facility] locations   English and Swedish:[phone]    [place]: [phone]   https://www.theAusten Riggs Centerlypartnership.org/?      [place] [facility]   Nate Iyer [place], [place]   [phone]   [email or web address].[facility]    https://naturebased.[facility]/      [facility]   Providers: Мария Figueroa [facility]   [place]    [phone]   [email or web address]   https://PayTango.[employer]/rukhsana Yuan [facility]   Providers: Giana Gómez [facility]   [phone]   giana@[employer]   https://[employer]/      [facility]   Provider: Alexei Simmons MSW, [facility]?   [place]   [phone]   [email or web address]   [email or web address]://[email or web address]/contact      [facility]   Providers: Nevaeh Chavez PhD,    [phone]   [facility]   https://www.Hukkster/care/find/doctor/20309/       [facility]   [facility] Providers   [phone]   [place]   https://transcendpsychotherapy.com/      [facility]   Providers: Lisa Trinidad MSW, [facility]   [phone]   https://[facility].org/bio-maynor      [facility]   Providers: [facility]   [phone]   info@Rent the Runway   https://Rent the Runway/?      [facility]   Bibiana Brown, LPCC [place]   [phone]   https://WiSpry/te/      [facility]   Providers: Nancy Mcnair MSW, [facility]   **telehealth only   [place]    [phone]   email: info@[employer]?   https://[employer] Financial/?      Organizations--[place]     [facility]   Ages 12-26   [phone]   [place]   https://reclaim.[facility]/?      [facility]   Giovany Love Psych NP   [place]   [phone]   Age 12+ psych care and hormone care   **in person only      [facility]   Providers: Maria De Jesus Bae MS, & others   **Only provides letters of supports with multiple visits   [place]?   [phone]   https://M[employer] Mall/team-members/scot/       [facility]   Sexual Health and Gender Program   Providers:  Mercedes Kaur MA; Higinio Ba PsyD, CST; Wilmer Magaña MA   [place]   [phone]   https://MoFuse.com/counseling-for-lgbtqia-concerns/?      [facility]   Providers: Yanelis Yu PsyD   [place]   [phone]   https://account.[facility].org/providers/5818?      [facility]   Kevan Harry   **Autism competent   Phone: [phone]   https://Rupture/      [facility]?   Ages 11-24   [phone]   [facility]   https://fclk1mfxj.org/support/mental-health/?     [facility]   Providers: Grace Key LMFT   **Autism competent     [place]   [phone]   https://www.[facility]org/sean?      [facility] Multiple Providers   [place], [place]   [phone]   clinic@[facility]   https://[facility]/      [facility]   Providers: Tyshawn Schaffer PhD   **Kinyarwanda Speaking   [place]   [phone]   https://www.[employer]./care/find/doctor/196923/?      [facility]   Provider: Caryl Etienne   [place]   [phone]   http://www.intentionalselfcounseling.org       [facility]   Providers: Darryl Simeon MA, LMFT [place]   [phone]   [email or web address]?   https://www.parkdaletherapy.org/darryl?      [facility]   Providers: Katiana Chew MA, LMFT [place]   [phone]   https://www.Hakiaapy.com/?      [facility]   Providers: Corinne Segal MA, [facility] (9+)   [place]   [phone]   https://www.[employer].com/meet-our-team/vunuruxyt-xg-jkrhanccpe      [facility]   **Autism competent   [place]   Phone: [phone]   https://[employer]./index.html      [facility]   Provider: JACKLYN Ruiz   [phone]   [email or web address].         [facility] Private [facility]     Parveen Dolan  Carmen   **Slovenian and English   [place] - Telehealth only currently   [phone] (call or text)   email: [email or web address]       Katiana Neal PsyD   [place]   [phone]   https://www.[email or web address].Reebee/?      Marla Madera LICSW [place]   [phone]   info@Marshad Technology Group.Reebee?   https://www.PPT Reasearch/psychotherapy/?      Genevieve Lord MA   [place]   [phone]   https://SocialDiabetes/??      Taniya Barlow LICSW [place]   [phone]   https://www.GroupCharger/      Malachi Gutierrez LICSW [place]   [phone]   https://Rhenovia Pharma/   https://Rhenovia Pharma/contact/      SHREYAS Price PsyD [place]   http://www.Keychain Logistics.Reebee/contact/       Mely Fay [facility]   [phone]   [place]   https://Marketo Japan/?   **waitlist      Landry ANNE, [facility]   [phone]   https://IgnitionOne.com/bio/?      Landry Patterson MA, LP, PhD   [facility]   [place]   [phone]   [email or web address]   http://www.BoomBang/about/?      Jessika Jewell [facility]   [phone]   [place]   [email or web address]   http://www."CUBED, Inc.".Reebee?      Afua Nino, PhD   [place], [place]   [phone]   http://[email or web address]/      Afua Crump [facility]   [phone]   https://www.TapFame.Reebee/?      Margareth Palma [facility], [facility]   [place]   [phone]   https://Mozilla/       Licha Chi (they/them), [facility]   [place], [place]   Phone: [phone]   www.Transformpsychotherapyllc.com    Neurodivergence, Poly Relationships, Gender and Sexuality      Oriana ANNE, [facility]   [phone]   [place], [place]   [email or web address]?   https://Verastem.Reebee/?      Ryan Dean PhD [place]   [phone]   http://www.sharOn-Q-ityrenee.Reebee/?      DAYANA Peterson, MElroyEd., [facility], [facility]    [phone]   [email or web address].[email or web address]?   https://www.uAfrica.Lemoptix/       Vandana Coombs [facility]    JACKLYN Frausto   [phone]   https://www.Heyy/      Francine Foster MA [facility]   [phone]   http://RawFlow/       Yaritza Henley [facility]   [phone]   JACKLYN Oliva@CHiWAO Mobile App   https://CHiWAO Mobile App/?      Chante York [facility]   Ages 18+   [phone]   [place]   Chante@Red Sky Lab   https://www.Red Sky Lab/?      Private Pay - [facility]     Martina Fenton MA   [place], [place]   [phone]   https://www.lemonade.uk       Savanah Pen[facility], [facility]   [phone]   [email or web address]@Arctic Empire     **Sex and relationship therapy      SOCORRO Rodriguez, JLS Consulting   [place]   [phone]   https://www.socialworktherapy.net/services       [facility]   [place], [place]   [phone]   https://www.psychologytoday.com/us/therapists/xovinm-zyszumeqjo-wwicqefyjjk-mn/286947       Sharath Kunz [facility], telehealth   https://www.CAMAC Energy.Lemoptix/contact    https://www.CAMAC Energy.Lemoptix/services          Walk-In Counseling     [facility]   [address] [place] [address]   [phone]   Mon, Wed, Fri 1:00-3:00pm: In person & Virtual   Mon-Thurs 5:30pm-7:30pm: Virtual   https://walkin.org/join-counseling-now-cw-dmmsq-sq-computer/          [facility] Psychiatric Providers     Joyce HARRINGTON, CNP (he/they)    [facility]   [phone]   https://www.[facility].org/providers/WalterYkelly-7889177424    **waitlist      Turner Bishop PA-C   [facility]   [phone]   https://www.StrategyEye.Lemoptix/care/find/doctor/17838/       [facility]   Provider: Hank Tabor, Psych NP   [place]   [phone]    https://[facility]/clinicians/yrsiqf-gxhyacio-iqyf-phn-pmhnp-bc/       [facility]   Multiple Providers   [place], [place]?   [phone]   https://Versa NetworksHegg Health Center AveraFazland/services/medication-management/      [facility]?   Multiple Providers   [facility] locations   [phone]   email: [email or web address] Pharmaceuticals   https://Idun Pharmaceuticals/??         [facility]   Providers: Ruma Coleman PsyD; SOCORRO Bustillos?   [place], [place]   [phone]   https://www.[facility]/locations/[facility]-clinic/?      Varsha Hamilton [place], [place]   [phone]   https://encouragementclinic.[facility].me/#home       [facility]   Providers: Denise Valencia [place], [place]   [phone]   https://www.[facility]/location/[place]/?      [facility]   [place], [place]   [phone]   http://[facility]/contact.html?      [facility]   Providers: Justin Malloy [facility]   [address], [address]   [place], [place] [address] [phone]   https://www.SkipoGenesis HospitalSocialPicks/services/integrated-behavioral-health/          [facility]   Providers: Ashley JOSE   [place], [place]   [phone]   [email or web address]   https://www.AliveCor/ourstaff?      [facility]   Providers: Kvng Mantilla   [place], [place]   [phone]   oneal@[facility].org?   http://www.[facility].org/our-team.html?      [facility]   Providers: Vernell Parkinson, Annika Lopez Katelyn Croteau   [place], [place]   Phone: [phone]   LGBTQ Therapy - [facility]   pridecounselingservices.com

## 2024-05-07 ENCOUNTER — ALLIED HEALTH/NURSE VISIT (OUTPATIENT)
Dept: FAMILY MEDICINE | Facility: CLINIC | Age: 29
End: 2024-05-07
Payer: MEDICAID

## 2024-05-07 DIAGNOSIS — Z71.9 COUNSELED BY NURSE: Primary | ICD-10-CM

## 2024-05-07 LAB
ALBUMIN SERPL BCG-MCNC: 4.5 G/DL (ref 3.5–5.2)
ALP SERPL-CCNC: 86 U/L (ref 40–150)
ALT SERPL W P-5'-P-CCNC: 35 U/L (ref 0–70)
ANION GAP SERPL CALCULATED.3IONS-SCNC: 10 MMOL/L (ref 7–15)
AST SERPL W P-5'-P-CCNC: 31 U/L (ref 0–45)
BILIRUB DIRECT SERPL-MCNC: <0.2 MG/DL (ref 0–0.3)
BILIRUB SERPL-MCNC: 0.2 MG/DL
BUN SERPL-MCNC: 9.2 MG/DL (ref 6–20)
CALCIUM SERPL-MCNC: 9.6 MG/DL (ref 8.6–10)
CHLORIDE SERPL-SCNC: 105 MMOL/L (ref 98–107)
CHOLEST SERPL-MCNC: 257 MG/DL
CREAT SERPL-MCNC: 0.9 MG/DL (ref 0.51–1.17)
DEPRECATED HCO3 PLAS-SCNC: 25 MMOL/L (ref 22–29)
EGFRCR SERPLBLD CKD-EPI 2021: 88 ML/MIN/1.73M2
FASTING STATUS PATIENT QL REPORTED: YES
FASTING STATUS PATIENT QL REPORTED: YES
GLUCOSE SERPL-MCNC: 120 MG/DL (ref 70–99)
GLUCOSE SERPL-MCNC: 120 MG/DL (ref 70–99)
HDLC SERPL-MCNC: 52 MG/DL
LDLC SERPL CALC-MCNC: 143 MG/DL
NONHDLC SERPL-MCNC: 205 MG/DL
POTASSIUM SERPL-SCNC: 4.1 MMOL/L (ref 3.4–5.3)
PROT SERPL-MCNC: 7.6 G/DL (ref 6.4–8.3)
SHBG SERPL-SCNC: 36 NMOL/L (ref 11–135)
SODIUM SERPL-SCNC: 140 MMOL/L (ref 135–145)
TRIGL SERPL-MCNC: 309 MG/DL

## 2024-05-07 PROCEDURE — 99207 PR NO CHARGE NURSE ONLY: CPT

## 2024-05-07 NOTE — PROGRESS NOTES
Pt arrived to clinic for teaching self administration of testosterone. Pt successfully demonstrated drawing up medication and self administration. Pt will call or return to clinic if questions about self administration or other concerns.

## 2024-05-09 LAB
TESTOST FREE SERPL-MCNC: 0.32 NG/DL
TESTOST SERPL-MCNC: 19 NG/DL (ref 8–950)

## 2024-06-06 ENCOUNTER — NURSE TRIAGE (OUTPATIENT)
Dept: NURSING | Facility: CLINIC | Age: 29
End: 2024-06-06
Payer: MEDICAID

## 2024-06-06 ENCOUNTER — TELEPHONE (OUTPATIENT)
Dept: FAMILY MEDICINE | Facility: CLINIC | Age: 29
End: 2024-06-06
Payer: MEDICAID

## 2024-06-06 ENCOUNTER — MYC MEDICAL ADVICE (OUTPATIENT)
Dept: FAMILY MEDICINE | Facility: CLINIC | Age: 29
End: 2024-06-06
Payer: MEDICAID

## 2024-06-06 NOTE — TELEPHONE ENCOUNTER
VM left for pt, informing PCP has a message in their chart in response to medication concern. My chart message sent to pt as well.

## 2024-06-06 NOTE — TELEPHONE ENCOUNTER
Please see My Chart Message    S-(situation):   Patient calling with questions/concerns    B-(background):   Patient self injected Testosterone.  Reports medicine leaked with large amount of blood.    A-(assessment):   Bleeding has stopped. Bruising noted in area. (RN reassured patient that bleeding can occur with injection).    Patient concerned about the loss of medication.  PATIENT takes 0.5 ml every other week.      R-(recommendations):     Please advise if patient needs to inject another dose to replace what was lost or if alternative option to make certain receives medication.

## 2024-06-06 NOTE — CONFIDENTIAL NOTE
Please let patient know he did not inject the medication correctly and needs an appointment with a nurse to learn how to do this correctly.  Would like patient to wait until he is due for his next dose as we have no way of knowing how much he got.

## 2024-06-06 NOTE — TELEPHONE ENCOUNTER
Returned a call/MyChart message from the clinic made by Ekaterina Robledo. Caller was transferred to Nurse Advisors.  I transferred Brian to the clinic and instructed she tell them not to transfer her to nurse advisors and that she wants to speak to someone in the clinic, preferably, Ekaterina Robledo RN.  Caller stated understanding and agreement.  Cindy MENDOZA RN [place] [facility]

## 2024-06-21 ENCOUNTER — ALLIED HEALTH/NURSE VISIT (OUTPATIENT)
Dept: FAMILY MEDICINE | Facility: CLINIC | Age: 29
End: 2024-06-21
Payer: MEDICAID

## 2024-06-21 DIAGNOSIS — K90.9 INTESTINAL MALABSORPTION, UNSPECIFIED TYPE: Primary | ICD-10-CM

## 2024-06-21 DIAGNOSIS — E11.9 TYPE 2 DIABETES MELLITUS WITHOUT COMPLICATION, WITHOUT LONG-TERM CURRENT USE OF INSULIN (H): ICD-10-CM

## 2024-06-21 DIAGNOSIS — Z71.9 COUNSELED BY NURSE: Primary | ICD-10-CM

## 2024-06-21 PROCEDURE — 99207 PR NO CHARGE NURSE ONLY: CPT

## 2024-06-21 NOTE — PROGRESS NOTES
Patient arrived for testosterone teaching. Patient demonstrated proper drawing up of correct dose, and self administration. Patient will call with questions, and may return for reassurance with next dose.

## 2024-07-19 DIAGNOSIS — K90.9 INTESTINAL MALABSORPTION: Primary | ICD-10-CM

## 2024-07-22 ENCOUNTER — TELEPHONE (OUTPATIENT)
Dept: FAMILY MEDICINE | Facility: CLINIC | Age: 29
End: 2024-07-22

## 2024-07-22 ENCOUNTER — LAB (OUTPATIENT)
Dept: LAB | Facility: CLINIC | Age: 29
End: 2024-07-22
Payer: MEDICAID

## 2024-07-22 DIAGNOSIS — F64.0 GENDER DYSPHORIA IN ADULT: ICD-10-CM

## 2024-07-22 DIAGNOSIS — E11.9 TYPE 2 DIABETES MELLITUS WITHOUT COMPLICATION, WITHOUT LONG-TERM CURRENT USE OF INSULIN (H): ICD-10-CM

## 2024-07-22 DIAGNOSIS — K90.9 INTESTINAL MALABSORPTION, UNSPECIFIED TYPE: ICD-10-CM

## 2024-07-22 LAB
ALBUMIN SERPL BCG-MCNC: 4.4 G/DL (ref 3.5–5.2)
ALP SERPL-CCNC: 86 U/L (ref 40–150)
ALT SERPL W P-5'-P-CCNC: 19 U/L (ref 0–70)
AST SERPL W P-5'-P-CCNC: 29 U/L (ref 0–45)
BASOPHILS # BLD AUTO: 0 10E3/UL (ref 0–0.2)
BASOPHILS NFR BLD AUTO: 0 %
BILIRUB DIRECT SERPL-MCNC: <0.2 MG/DL (ref 0–0.3)
BILIRUB SERPL-MCNC: 0.4 MG/DL
CALCIUM SERPL-MCNC: 9.5 MG/DL (ref 8.8–10.4)
CHOLEST SERPL-MCNC: 123 MG/DL
EOSINOPHIL # BLD AUTO: 0.2 10E3/UL (ref 0–0.7)
EOSINOPHIL NFR BLD AUTO: 1 %
ERYTHROCYTE [DISTWIDTH] IN BLOOD BY AUTOMATED COUNT: 13.2 % (ref 10–15)
FASTING STATUS PATIENT QL REPORTED: YES
HBA1C MFR BLD: 6.8 % (ref 0–5.6)
HCT VFR BLD AUTO: 42.8 % (ref 35–53)
HDLC SERPL-MCNC: 41 MG/DL
HGB BLD-MCNC: 13.8 G/DL (ref 11.7–17.7)
IMM GRANULOCYTES # BLD: 0 10E3/UL
IMM GRANULOCYTES NFR BLD: 0 %
LDLC SERPL CALC-MCNC: 48 MG/DL
LYMPHOCYTES # BLD AUTO: 3.6 10E3/UL (ref 0.8–5.3)
LYMPHOCYTES NFR BLD AUTO: 26 %
MCH RBC QN AUTO: 30.1 PG (ref 26.5–33)
MCHC RBC AUTO-ENTMCNC: 32.2 G/DL (ref 31.5–36.5)
MCV RBC AUTO: 93 FL (ref 78–100)
MONOCYTES # BLD AUTO: 0.7 10E3/UL (ref 0–1.3)
MONOCYTES NFR BLD AUTO: 5 %
NEUTROPHILS # BLD AUTO: 9.5 10E3/UL (ref 1.6–8.3)
NEUTROPHILS NFR BLD AUTO: 68 %
NONHDLC SERPL-MCNC: 82 MG/DL
PLATELET # BLD AUTO: 290 10E3/UL (ref 150–450)
PROT SERPL-MCNC: 7.5 G/DL (ref 6.4–8.3)
RBC # BLD AUTO: 4.58 10E6/UL (ref 3.8–5.9)
SHBG SERPL-SCNC: 27 NMOL/L (ref 11–135)
TRIGL SERPL-MCNC: 169 MG/DL
VIT B12 SERPL-MCNC: 691 PG/ML (ref 232–1245)
VIT D+METAB SERPL-MCNC: 58 NG/ML (ref 20–50)
WBC # BLD AUTO: 13.9 10E3/UL (ref 4–11)

## 2024-07-22 PROCEDURE — 80076 HEPATIC FUNCTION PANEL: CPT

## 2024-07-22 PROCEDURE — 82607 VITAMIN B-12: CPT

## 2024-07-22 PROCEDURE — 85025 COMPLETE CBC W/AUTO DIFF WBC: CPT | Mod: QW

## 2024-07-22 PROCEDURE — 36415 COLL VENOUS BLD VENIPUNCTURE: CPT

## 2024-07-22 PROCEDURE — 84270 ASSAY OF SEX HORMONE GLOBUL: CPT

## 2024-07-22 PROCEDURE — 82306 VITAMIN D 25 HYDROXY: CPT

## 2024-07-22 PROCEDURE — 82310 ASSAY OF CALCIUM: CPT

## 2024-07-22 PROCEDURE — 80061 LIPID PANEL: CPT

## 2024-07-22 PROCEDURE — 84403 ASSAY OF TOTAL TESTOSTERONE: CPT

## 2024-07-22 PROCEDURE — 83036 HEMOGLOBIN GLYCOSYLATED A1C: CPT

## 2024-07-22 NOTE — TELEPHONE ENCOUNTER
General Call    Contacts       Contact Date/Time Type Contact Phone/Fax    07/22/2024 02:50 PM CDT Phone (Incoming) [facility] [phone]          Reason for Call:  Lab specimen    What are your questions or concerns:  There was not enough of a specimen to do a Urine Test. Patient will have to redo test or cancel lab order

## 2024-07-22 NOTE — TELEPHONE ENCOUNTER
Called to let Brian know that we only have lab orders for her, no MA/LPN visit needed. Brian is on the lab schedule for 2 visits, not sure what time he will be coming. 9:30 or 10:30.

## 2024-07-24 LAB
TESTOST FREE SERPL-MCNC: 1.22 NG/DL
TESTOST SERPL-MCNC: 60 NG/DL (ref 8–950)

## 2024-08-06 ENCOUNTER — OFFICE VISIT (OUTPATIENT)
Dept: FAMILY MEDICINE | Facility: CLINIC | Age: 29
End: 2024-08-06
Payer: MEDICAID

## 2024-08-06 VITALS
TEMPERATURE: 97.8 F | SYSTOLIC BLOOD PRESSURE: 110 MMHG | HEIGHT: 68 IN | BODY MASS INDEX: 39.36 KG/M2 | DIASTOLIC BLOOD PRESSURE: 80 MMHG | WEIGHT: 259.7 LBS | RESPIRATION RATE: 16 BRPM | HEART RATE: 75 BPM | OXYGEN SATURATION: 95 %

## 2024-08-06 DIAGNOSIS — E66.01 CLASS 2 SEVERE OBESITY DUE TO EXCESS CALORIES WITH SERIOUS COMORBIDITY AND BODY MASS INDEX (BMI) OF 39.0 TO 39.9 IN ADULT (H): ICD-10-CM

## 2024-08-06 DIAGNOSIS — E11.9 TYPE 2 DIABETES MELLITUS WITHOUT COMPLICATION, WITHOUT LONG-TERM CURRENT USE OF INSULIN (H): Primary | ICD-10-CM

## 2024-08-06 DIAGNOSIS — E66.812 CLASS 2 SEVERE OBESITY DUE TO EXCESS CALORIES WITH SERIOUS COMORBIDITY AND BODY MASS INDEX (BMI) OF 39.0 TO 39.9 IN ADULT (H): ICD-10-CM

## 2024-08-06 DIAGNOSIS — F64.0 GENDER DYSPHORIA IN ADULT: ICD-10-CM

## 2024-08-06 DIAGNOSIS — R79.89 LOW VITAMIN D LEVEL: ICD-10-CM

## 2024-08-06 DIAGNOSIS — J45.40 MODERATE PERSISTENT ASTHMA WITHOUT COMPLICATION: ICD-10-CM

## 2024-08-06 DIAGNOSIS — Z59.41 FOOD INSECURITY: ICD-10-CM

## 2024-08-06 PROBLEM — I10 HYPERTENSION: Status: ACTIVE | Noted: 2024-08-06

## 2024-08-06 PROBLEM — F33.1 MODERATE EPISODE OF RECURRENT MAJOR DEPRESSIVE DISORDER (H): Status: ACTIVE | Noted: 2018-07-15

## 2024-08-06 PROCEDURE — 99214 OFFICE O/P EST MOD 30 MIN: CPT | Performed by: FAMILY MEDICINE

## 2024-08-06 PROCEDURE — G2211 COMPLEX E/M VISIT ADD ON: HCPCS | Performed by: FAMILY MEDICINE

## 2024-08-06 RX ORDER — SEMAGLUTIDE 0.68 MG/ML
INJECTION, SOLUTION SUBCUTANEOUS
COMMUNITY
Start: 2024-07-29 | End: 2024-08-20

## 2024-08-06 RX ORDER — PREGABALIN 300 MG/1
300 CAPSULE ORAL
COMMUNITY
Start: 2023-09-29

## 2024-08-06 RX ORDER — CARBAMAZEPINE 100 MG/1
100 TABLET, EXTENDED RELEASE ORAL
COMMUNITY
Start: 2023-08-07

## 2024-08-06 RX ORDER — BREXPIPRAZOLE 0.5 MG/1
1 TABLET ORAL
COMMUNITY
Start: 2024-07-23

## 2024-08-06 RX ORDER — TESTOSTERONE CYPIONATE 200 MG/ML
50 INJECTION, SOLUTION INTRAMUSCULAR
Qty: 2 ML | Refills: 0 | Status: SHIPPED | OUTPATIENT
Start: 2024-08-06 | End: 2024-08-20

## 2024-08-06 RX ORDER — BLOOD SUGAR DIAGNOSTIC
STRIP MISCELLANEOUS
COMMUNITY
Start: 2024-02-01

## 2024-08-06 SDOH — ECONOMIC STABILITY - FOOD INSECURITY: FOOD INSECURITY: Z59.41

## 2024-08-06 ASSESSMENT — ASTHMA QUESTIONNAIRES: ACT_TOTALSCORE: 23

## 2024-08-06 NOTE — PATIENT INSTRUCTIONS
Check with your pharmacist to see if your testosterone is a single dose vial or a multiple dose vial with a preservative in it.  A single dose vial needs to be thrown out after 1 use, a multiple dose vial haS TO BE THROWN OUT 28 DAYS AFTER FIRST USE.

## 2024-08-06 NOTE — PROGRESS NOTES
Assessment & Plan   Problem List Items Addressed This Visit       Diabetes mellitus, type 2 (H) - Primary    Relevant Medications    ONETOUCH ULTRA test strip    OZEMPIC, 0.25 OR 0.5 MG/DOSE, 2 MG/3ML pen    Semaglutide, 1 MG/DOSE, (OZEMPIC) 4 MG/3ML pen    Semaglutide, 2 MG/DOSE, (OZEMPIC) 8 MG/3ML pen (Start on 9/3/2024)    Other Relevant Orders    Albumin Random Urine Quantitative with Creat Ratio    Adult Diabetes Education  Referral    Class 2 severe obesity due to excess calories with serious comorbidity in adult (H)    Relevant Medications    OZEMPIC, 0.25 OR 0.5 MG/DOSE, 2 MG/3ML pen    Semaglutide, 1 MG/DOSE, (OZEMPIC) 4 MG/3ML pen    Semaglutide, 2 MG/DOSE, (OZEMPIC) 8 MG/3ML pen (Start on 9/3/2024)    Moderate persistent asthma without complication     Other Visit Diagnoses       Food insecurity        Relevant Orders    Primary Care - Care Coordination Referral    Low vitamin D level        Relevant Orders    25 OH Vit D therapy monitoring    Gender dysphoria in adult        Relevant Medications    testosterone cypionate (DEPOTESTOSTERONE) 200 MG/ML injection             Gender dysphoria patient has been lost to follow-up.  He has been out of his testosterone.  He had not been taking it when recent labs were done.  Will have patient resume his test last thrown at current dose and recheck labs in 1 week.  Would then like him to follow-up with me to review results.  He has not noticed any physical changes with his current dose of testosterone and is hoping to increase the dose.    Food insecurity referral placed to primary care coordinators.    Previous low vitamin D level will check vitamin D level with next set of labs    Moderate persistent asthma without complication noted    Type 2 diabetes which is a comorbid condition to his morbid obesity.  Renewed patient's One Touch ultra test strips.  He is ready to increase his dose so additional doses of Ozempic placed.  He is aware of the black box  "warning that there is a no risk of thyroid medullary cancer in rodents.  He has not had any neck masses or dysphagia.          The longitudinal plan of care for the diagnosis(es)/condition(s) as documented were addressed during this visit. Due to the added complexity in care, I will continue to support Brian in the subsequent management and with ongoing continuity of care.                 Subjective   Brian is a 29 year old, presenting for the following health issues:  Follow Up ([facility], He is wondering if we have needle and syringes as he does not have the money for these supplies. )        8/6/2024     8:48 AM   Additional Questions   Roomed by Louisa     History of Present Illness       Reason for visit:  Testosterone    He eats 0-1 servings of fruits and vegetables daily.He consumes 0 sweetened beverage(s) daily.He exercises with enough effort to increase his heart rate 10 to 19 minutes per day.  He exercises with enough effort to increase his heart rate 3 or less days per week. He is missing 7 dose(s) of medications per week.  He is not taking prescribed medications regularly due to other.                     Objective    /80 (BP Location: Right arm, Patient Position: Sitting)   Pulse 75   Temp 97.8  F (36.6  C) (Tympanic)   Resp 16   Ht 1.727 m (5' 8\")   Wt 117.8 kg (259 lb 11.2 oz)   LMP  (LMP Unknown)   SpO2 95%   BMI 39.49 kg/m    Body mass index is 39.49 kg/m .  Physical Exam               Signed Electronically by: Alysha Durham MD    "

## 2024-08-08 ENCOUNTER — PATIENT OUTREACH (OUTPATIENT)
Dept: CARE COORDINATION | Facility: CLINIC | Age: 29
End: 2024-08-08
Payer: MEDICAID

## 2024-08-08 NOTE — PROGRESS NOTES
Clinic Care Coordination Contact  [facility] [[email or web address]]/Voicemail    Clinical Data: Care Coordinator Outreach    Outreach Documentation Number of Outreach Attempt   8/8/2024  11:38 AM 1       Left message on patient's voicemail with call back information and requested return call.    Plan: Care Coordinator will try to reach patient again in 1-2 business days.    JASPREET Torres  [phone]  [facility]

## 2024-08-08 NOTE — LETTER
Dear Brian,    I am a clinic community health worker who works with Alysha Durham MD with the [facility]. I have been trying to reach you recently to introduce Clinic Care Coordination. Below is a description of clinic care coordination and how I can further assist you.       The clinic care coordination team is made up of a registered nurse, , financial resource worker and community health worker who understand the health care system. The goal of clinic care coordination is to help you manage your health and improve access to the health care system. Our team works alongside your provider to assist you in determining your health and social needs. We can help you obtain health care and community resources, providing you with necessary information and education. We can work with you through any barriers and develop a care plan that helps coordinate and strengthen the communication between you and your care team.  Our services are voluntary and are offered without charge to you personally.    Please feel free to contact me with any questions or concerns regarding care coordination and what we can offer.      We are focused on providing you with the highest-quality healthcare experience possible.    Sincerely,     Edel Caraballo  Community Health Worker  [facility] Care Coordination   Elvia Liz[place]aga, [place], [place], [facility]  Office: [phone]

## 2024-08-09 NOTE — PROGRESS NOTES
Clinic Care Coordination Contact  [facility]/Voicemail    Clinical Data: Care Coordinator Outreach    Outreach Documentation Number of Outreach Attempt   8/8/2024  11:38 AM 1   8/9/2024   8:59 AM 2     Left message on patient's voicemail with call back information and requested return call.    Plan: Care Coordinator will send care coordination introduction letter with care coordinator contact information and explanation of care coordination services via Northcore Technologieshart. Care Coordinator will do no further outreaches at this time.    Edel Caraballo  Community Health Worker  [facility] Care Coordination   [place], [place], [place], [facility]  Office: [phone]

## 2024-08-13 ENCOUNTER — LAB (OUTPATIENT)
Dept: LAB | Facility: CLINIC | Age: 29
End: 2024-08-13
Payer: MEDICAID

## 2024-08-13 DIAGNOSIS — R79.89 LOW VITAMIN D LEVEL: ICD-10-CM

## 2024-08-13 DIAGNOSIS — Z51.81 ENCOUNTER FOR THERAPEUTIC DRUG MONITORING: ICD-10-CM

## 2024-08-13 DIAGNOSIS — E11.9 TYPE 2 DIABETES MELLITUS WITHOUT COMPLICATION, WITHOUT LONG-TERM CURRENT USE OF INSULIN (H): ICD-10-CM

## 2024-08-13 DIAGNOSIS — F64.0 GENDER DYSPHORIA IN ADULT: ICD-10-CM

## 2024-08-13 LAB
CREAT UR-MCNC: 157 MG/DL
ESTRADIOL SERPL-MCNC: 36 PG/ML
MICROALBUMIN UR-MCNC: 33.9 MG/L
MICROALBUMIN/CREAT UR: 21.59 MG/G CR (ref 0–25)
SHBG SERPL-SCNC: 54 NMOL/L (ref 11–135)

## 2024-08-13 PROCEDURE — 82043 UR ALBUMIN QUANTITATIVE: CPT

## 2024-08-13 PROCEDURE — 84270 ASSAY OF SEX HORMONE GLOBUL: CPT

## 2024-08-13 PROCEDURE — 82306 VITAMIN D 25 HYDROXY: CPT

## 2024-08-13 PROCEDURE — 82570 ASSAY OF URINE CREATININE: CPT

## 2024-08-13 PROCEDURE — 36415 COLL VENOUS BLD VENIPUNCTURE: CPT

## 2024-08-13 PROCEDURE — 82670 ASSAY OF TOTAL ESTRADIOL: CPT

## 2024-08-13 PROCEDURE — 84403 ASSAY OF TOTAL TESTOSTERONE: CPT

## 2024-08-15 LAB
TESTOST FREE SERPL-MCNC: 1.43 NG/DL
TESTOST SERPL-MCNC: 107 NG/DL (ref 8–950)

## 2024-08-18 LAB
DEPRECATED CALCIDIOL+CALCIFEROL SERPL-MC: <73 UG/L (ref 20–75)
VITAMIN D2 SERPL-MCNC: <5 UG/L
VITAMIN D3 SERPL-MCNC: 68 UG/L

## 2024-08-20 ENCOUNTER — TELEPHONE (OUTPATIENT)
Dept: FAMILY MEDICINE | Facility: CLINIC | Age: 29
End: 2024-08-20

## 2024-08-20 ENCOUNTER — MYC MEDICAL ADVICE (OUTPATIENT)
Dept: FAMILY MEDICINE | Facility: CLINIC | Age: 29
End: 2024-08-20

## 2024-08-20 ENCOUNTER — VIRTUAL VISIT (OUTPATIENT)
Dept: FAMILY MEDICINE | Facility: CLINIC | Age: 29
End: 2024-08-20
Payer: MEDICAID

## 2024-08-20 DIAGNOSIS — F64.0 GENDER DYSPHORIA IN ADULT: ICD-10-CM

## 2024-08-20 DIAGNOSIS — E11.9 TYPE 2 DIABETES MELLITUS WITHOUT COMPLICATION, WITHOUT LONG-TERM CURRENT USE OF INSULIN (H): Primary | ICD-10-CM

## 2024-08-20 DIAGNOSIS — F33.1 MODERATE EPISODE OF RECURRENT MAJOR DEPRESSIVE DISORDER (H): ICD-10-CM

## 2024-08-20 PROCEDURE — G2211 COMPLEX E/M VISIT ADD ON: HCPCS | Mod: 95 | Performed by: FAMILY MEDICINE

## 2024-08-20 PROCEDURE — 99214 OFFICE O/P EST MOD 30 MIN: CPT | Mod: GT | Performed by: FAMILY MEDICINE

## 2024-08-20 PROCEDURE — 96127 BRIEF EMOTIONAL/BEHAV ASSMT: CPT | Mod: 95 | Performed by: FAMILY MEDICINE

## 2024-08-20 RX ORDER — TESTOSTERONE CYPIONATE 200 MG/ML
75 INJECTION, SOLUTION INTRAMUSCULAR
Qty: 7 ML | Refills: 0 | Status: SHIPPED | OUTPATIENT
Start: 2024-08-20

## 2024-08-20 ASSESSMENT — PATIENT HEALTH QUESTIONNAIRE - PHQ9: SUM OF ALL RESPONSES TO PHQ QUESTIONS 1-9: 22

## 2024-08-20 NOTE — PROGRESS NOTES
Brian is a 29 year old who is being evaluated via a billable video visit.    How would you like to obtain your AVS? MyChart  If the video visit is dropped, the invitation should be resent by: Text to cell phone: [phone]  Will anyone else be joining your video visit? No      Assessment & Plan   Problem List Items Addressed This Visit       Diabetes mellitus, type 2 (H) - Primary    Moderate episode of recurrent major depressive disorder (H)    Gender dysphoria in adult    Relevant Medications    testosterone cypionate (DEPOTESTOSTERONE) 200 MG/ML injection    Other Relevant Orders    Estradiol      Type 2 diabetes patient is interested in getting a continuous glucose monitor.  His diabetes is usually well-controlled on his Ozempic.  We did discuss that it might be reasonable for him to have some immediate release insulin that he could use to carb count prior to meals.  Might be reasonable to have a sliding scale insulin available for him to use.  This would also make him eligible I think for a continuous glucose monitor which she is interested in also.    Depression poorly controlled with a PHQ-9 of 22.  Patient reports that 1 week ago they met with her psychiatrist and has been switched to Rexulti from previous lamotrigine.  He also has a therapist that he does group therapy with them when needed individual therapy.  He is not having any suicidal thoughts and does feel that he will be safe to continue to follow-up as an outpatient with his psych team.    Gender dysphoria patient's been taking 50 mg of testosterone IM.  Labs show that we have some room for increasing the dose.  Will plan to switch him to 75 mg of Depo testosterone IM every other week.  In 2-1/2 months we can have him repeat labs and follow-up in 3 months.  He has not noticed any physiological changes with his current dose of testosterone.      The longitudinal plan of care for the diagnosis(es)/condition(s) as documented were addressed during this  visit. Due to the added complexity in care, I will continue to support Brian in the subsequent management and with ongoing continuity of care.       Start [id number] pm  Depression Screening Follow Up        8/20/2024    11:56 AM   PHQ   PHQ-9 Total Score 22   Q9: Thoughts of better off dead/self-harm past 2 weeks Several days                     Follow Up Actions Taken  Crisis resource information provided in the After Visit Summary    Discussed the following ways the patient can remain in a safe environment:  be around others        Subjective   Brian is a 29 year old, presenting for the following health issues:  Recheck Medication        8/20/2024    11:50 AM   Additional Questions   Roomed by Louisa Mccullough Start Time: 12:06PM    History of Present Illness       Reason for visit:  Testosterone    He eats 0-1 servings of fruits and vegetables daily.He consumes 0 sweetened beverage(s) daily.He exercises with enough effort to increase his heart rate 10 to 19 minutes per day.  He exercises with enough effort to increase his heart rate 3 or less days per week. He is missing 7 dose(s) of medications per week.  He is not taking prescribed medications regularly due to other.                   Objective       PATIENT HEALTH QUESTIONNAIRE-9 (PHQ - 9)    Over the last 2 weeks, how often have you been bothered by any of the following problems?    1. Little interest or pleasure in doing things -  Nearly every day   2. Feeling down, depressed, or hopeless -  More than half the days   3. Trouble falling or staying asleep, or sleeping too much - Nearly every day   4. Feeling tired or having little energy -  Nearly every day   5. Poor appetite or overeating -  More than half the days   6. Feeling bad about yourself - or that you are a failure or have let yourself or your family down -  More than half the days   7. Trouble concentrating on things, such as reading the newspaper or watching television - Nearly every day   8. Moving  or speaking so slowly that other people could have noticed? Or the opposite - being so fidgety or restless that you have been moving around a lot more than usual Nearly every day   9. Thoughts that you would be better off dead or of hurting  yourself in some way Several days   Total Score: 22     If you checked off any problems, how difficult have these problems made it for you to do your work, take care of things at home, or get along with other people? Very difficult    Developed by Ran Niño, Mely Lemus, Joby Albert and colleagues, with an educational nelida from Pfizer [employer]. No permission required to reproduce, translate, display or distribute. permission required to reproduce, translate, display or distribute.     Vitals:  No vitals were obtained today due to virtual visit.    Physical Exam   GENERAL: alert and no distress  EYES: Eyes grossly normal to inspection.  No discharge or erythema, or obvious scleral/conjunctival abnormalities.  RESP: No audible wheeze, cough, or visible cyanosis.    SKIN: Visible skin clear. No significant rash, abnormal pigmentation or lesions.  NEURO: Cranial nerves grossly intact.  Mentation and speech appropriate for age.  PSYCH: Appropriate affect, tone, and pace of words          Video-Visit Details    Type of service:  Video Visit   Video End Time:12:22 PM  Originating Location (pt. Location): Home    Distant Location (provider location):  On-site  Platform used for Video Visit: Tiffanie  Signed Electronically by: Alysha Durham MD

## 2024-08-20 NOTE — TELEPHONE ENCOUNTER
FYI - Status Update    Who is Calling: patient    Update: patient called at 6:50pm that archana did not have renewal script for Testosterone that today  PCP discussed going up on the testosterone but she was unsure of the exact MG    Does caller want a call/response back: Yes     Could we send this information to you in [employer] or would you prefer to receive a phone call?:   Patient would prefer a phone call   Okay to leave a detailed message?: Yes at Cell number on file:    Telephone Information:   Mobile [phone]

## 2024-08-21 NOTE — TELEPHONE ENCOUNTER
Duplicate request. Replied to patient in Wiz Mapshart message.   Testosterone was sent in 8/20/24 by Dr. Durham.

## 2024-08-21 NOTE — TELEPHONE ENCOUNTER
Call to pharmacy, too early for fill and insurance will not cover until 8/29/24. Replied to patient.

## 2024-08-29 ENCOUNTER — PATIENT OUTREACH (OUTPATIENT)
Dept: CARE COORDINATION | Facility: CLINIC | Age: 29
End: 2024-08-29
Payer: MEDICAID

## 2024-08-29 NOTE — PROGRESS NOTES
Clinic Care Coordination Contact  Community Health Worker Initial Outreach    Patient accepts CC: No, patient let CHW know that patient will try working with her [place]  first and will call CHW back in the future if need be. Patient was sent Care Coordination introduction letter for future reference on 8/9/24.     Edel Caraballo  Community Health Worker  [facility] Care Coordination   [place], [place], [place], [facility]  Office: [phone]

## 2024-08-30 DIAGNOSIS — R39.9 SYMPTOMS INVOLVING URINARY SYSTEM: Primary | ICD-10-CM

## 2024-09-17 ENCOUNTER — MYC MEDICAL ADVICE (OUTPATIENT)
Dept: FAMILY MEDICINE | Facility: CLINIC | Age: 29
End: 2024-09-17
Payer: MEDICAID

## 2024-09-17 DIAGNOSIS — F64.0 GENDER DYSPHORIA IN ADULT: ICD-10-CM

## 2024-09-17 NOTE — TELEPHONE ENCOUNTER
Spoke with pharmacy, patient received 2 ml vial on 9/1/24. He does have refill available on file.     Call to patient. Patient states that he only uses single dose out of vial and discards remaining medication. He will  refill at pharmacy and call back to schedule follow up appointment.

## 2024-10-01 ENCOUNTER — MYC MEDICAL ADVICE (OUTPATIENT)
Dept: FAMILY MEDICINE | Facility: CLINIC | Age: 29
End: 2024-10-01
Payer: MEDICAID

## 2024-10-01 NOTE — TELEPHONE ENCOUNTER
Please see My Chart Message.    testosterone cypionate (DEPOTESTOSTERONE) 200 MG/ML injection   Inject 0.375 mLs (75 mg) into the muscle every 14 days

## 2024-10-09 DIAGNOSIS — Z90.3 H/O GASTRIC SLEEVE: Primary | ICD-10-CM

## 2024-10-09 DIAGNOSIS — K90.9 INTESTINAL MALABSORPTION, UNSPECIFIED: ICD-10-CM

## 2024-10-10 ENCOUNTER — TRANSCRIBE ORDERS (OUTPATIENT)
Dept: OTHER | Age: 29
End: 2024-10-10

## 2024-10-10 DIAGNOSIS — R39.11 URINARY HESITANCY: Primary | ICD-10-CM

## 2024-10-10 DIAGNOSIS — R39.9 URINARY SYMPTOM OR SIGN: ICD-10-CM

## 2024-10-22 ENCOUNTER — LAB (OUTPATIENT)
Dept: LAB | Facility: CLINIC | Age: 29
End: 2024-10-22
Payer: MEDICAID

## 2024-10-22 DIAGNOSIS — E11.9 DIABETES MELLITUS, TYPE 2 (H): Primary | ICD-10-CM

## 2024-10-22 DIAGNOSIS — F64.0 GENDER DYSPHORIA IN ADULT: ICD-10-CM

## 2024-10-22 LAB
ALBUMIN SERPL BCG-MCNC: 4.2 G/DL (ref 3.5–5.2)
ALP SERPL-CCNC: 91 U/L (ref 40–150)
ALT SERPL W P-5'-P-CCNC: 17 U/L (ref 0–70)
AST SERPL W P-5'-P-CCNC: 23 U/L (ref 0–45)
BASOPHILS # BLD AUTO: 0 10E3/UL (ref 0–0.2)
BASOPHILS NFR BLD AUTO: 0 %
BILIRUB DIRECT SERPL-MCNC: <0.2 MG/DL (ref 0–0.3)
BILIRUB SERPL-MCNC: 0.4 MG/DL
CALCIUM SERPL-MCNC: 9.5 MG/DL (ref 8.8–10.4)
CHOLEST SERPL-MCNC: 118 MG/DL
EOSINOPHIL # BLD AUTO: 0.1 10E3/UL (ref 0–0.7)
EOSINOPHIL NFR BLD AUTO: 1 %
ERYTHROCYTE [DISTWIDTH] IN BLOOD BY AUTOMATED COUNT: 12.5 % (ref 10–15)
EST. AVERAGE GLUCOSE BLD GHB EST-MCNC: 131 MG/DL
ESTRADIOL SERPL-MCNC: 47 PG/ML
FASTING STATUS PATIENT QL REPORTED: YES
HBA1C MFR BLD: 6.2 % (ref 0–5.6)
HCT VFR BLD AUTO: 47.2 % (ref 35–53)
HDLC SERPL-MCNC: 40 MG/DL
HGB BLD-MCNC: 14.9 G/DL (ref 11.7–17.7)
IMM GRANULOCYTES # BLD: 0 10E3/UL
IMM GRANULOCYTES NFR BLD: 0 %
LDLC SERPL CALC-MCNC: 41 MG/DL
LYMPHOCYTES # BLD AUTO: 4.2 10E3/UL (ref 0.8–5.3)
LYMPHOCYTES NFR BLD AUTO: 48 %
MCH RBC QN AUTO: 29.4 PG (ref 26.5–33)
MCHC RBC AUTO-ENTMCNC: 31.6 G/DL (ref 31.5–36.5)
MCV RBC AUTO: 93 FL (ref 78–100)
MONOCYTES # BLD AUTO: 0.5 10E3/UL (ref 0–1.3)
MONOCYTES NFR BLD AUTO: 5 %
NEUTROPHILS # BLD AUTO: 4 10E3/UL (ref 1.6–8.3)
NEUTROPHILS NFR BLD AUTO: 45 %
NONHDLC SERPL-MCNC: 78 MG/DL
PLATELET # BLD AUTO: 257 10E3/UL (ref 150–450)
PROT SERPL-MCNC: 7.2 G/DL (ref 6.4–8.3)
RBC # BLD AUTO: 5.07 10E6/UL (ref 3.8–5.9)
SHBG SERPL-SCNC: 48 NMOL/L (ref 11–135)
TRIGL SERPL-MCNC: 183 MG/DL
VIT B12 SERPL-MCNC: 913 PG/ML (ref 232–1245)
VIT D+METAB SERPL-MCNC: 81 NG/ML (ref 20–50)
WBC # BLD AUTO: 8.8 10E3/UL (ref 4–11)

## 2024-10-22 PROCEDURE — 36415 COLL VENOUS BLD VENIPUNCTURE: CPT

## 2024-10-22 PROCEDURE — 84403 ASSAY OF TOTAL TESTOSTERONE: CPT

## 2024-10-22 PROCEDURE — 84270 ASSAY OF SEX HORMONE GLOBUL: CPT

## 2024-10-22 PROCEDURE — 85025 COMPLETE CBC W/AUTO DIFF WBC: CPT | Mod: QW

## 2024-10-22 PROCEDURE — 82670 ASSAY OF TOTAL ESTRADIOL: CPT

## 2024-10-22 PROCEDURE — 82607 VITAMIN B-12: CPT | Performed by: SURGERY

## 2024-10-22 PROCEDURE — 82306 VITAMIN D 25 HYDROXY: CPT | Performed by: SURGERY

## 2024-10-22 PROCEDURE — 80061 LIPID PANEL: CPT

## 2024-10-22 PROCEDURE — 80076 HEPATIC FUNCTION PANEL: CPT

## 2024-10-22 PROCEDURE — 83036 HEMOGLOBIN GLYCOSYLATED A1C: CPT

## 2024-10-22 PROCEDURE — 82310 ASSAY OF CALCIUM: CPT | Performed by: SURGERY

## 2024-10-24 LAB
TESTOST FREE SERPL-MCNC: 3.75 NG/DL
TESTOST SERPL-MCNC: 247 NG/DL (ref 8–950)

## 2024-10-28 ENCOUNTER — TRANSFERRED RECORDS (OUTPATIENT)
Dept: MULTI SPECIALTY CLINIC | Facility: CLINIC | Age: 29
End: 2024-10-28
Payer: MEDICAID

## 2024-10-28 LAB — RETINOPATHY: NEGATIVE

## 2024-10-29 ENCOUNTER — VIRTUAL VISIT (OUTPATIENT)
Dept: FAMILY MEDICINE | Facility: CLINIC | Age: 29
End: 2024-10-29
Payer: MEDICAID

## 2024-10-29 DIAGNOSIS — F64.0 GENDER DYSPHORIA IN ADULT: ICD-10-CM

## 2024-10-29 DIAGNOSIS — R60.0 LEG EDEMA, RIGHT: ICD-10-CM

## 2024-10-29 DIAGNOSIS — D68.51 HETEROZYGOUS FACTOR V LEIDEN MUTATION (H): ICD-10-CM

## 2024-10-29 DIAGNOSIS — Z86.718 PERSONAL HISTORY OF DVT (DEEP VEIN THROMBOSIS): Primary | ICD-10-CM

## 2024-10-29 DIAGNOSIS — F33.41 RECURRENT MAJOR DEPRESSIVE DISORDER, IN PARTIAL REMISSION (H): ICD-10-CM

## 2024-10-29 PROCEDURE — 99214 OFFICE O/P EST MOD 30 MIN: CPT | Mod: 95 | Performed by: FAMILY MEDICINE

## 2024-10-29 PROCEDURE — G2211 COMPLEX E/M VISIT ADD ON: HCPCS | Mod: 95 | Performed by: FAMILY MEDICINE

## 2024-10-29 RX ORDER — CLINDAMYCIN PHOSPHATE 10 MG/G
GEL TOPICAL
COMMUNITY
Start: 2024-10-16

## 2024-10-29 RX ORDER — LORATADINE 10 MG/1
1 TABLET ORAL
COMMUNITY
Start: 2024-09-04

## 2024-10-29 RX ORDER — LUBIPROSTONE 8 UG/1
CAPSULE ORAL
COMMUNITY
Start: 2024-10-16

## 2024-10-29 RX ORDER — TESTOSTERONE CYPIONATE 200 MG/ML
100 INJECTION, SOLUTION INTRAMUSCULAR
Qty: 7 ML | Refills: 0 | Status: SHIPPED | OUTPATIENT
Start: 2024-10-29

## 2024-10-29 RX ORDER — TRETINOIN 0.1 MG/G
GEL TOPICAL
COMMUNITY
Start: 2024-10-17

## 2024-10-29 RX ORDER — RIVAROXABAN 10 MG/1
1 TABLET, FILM COATED ORAL
COMMUNITY
Start: 2024-10-15

## 2024-10-29 ASSESSMENT — PATIENT HEALTH QUESTIONNAIRE - PHQ9: SUM OF ALL RESPONSES TO PHQ QUESTIONS 1-9: 17

## 2024-10-29 NOTE — PROGRESS NOTES
Brian is a 29 year old who is being evaluated via a billable video visit.    How would you like to obtain your AVS? MyChart  If the video visit is dropped, the invitation should be resent by: Text to cell phone: [phone]  Will anyone else be joining your video visit? No      Assessment & Plan   Problem List Items Addressed This Visit       Gender dysphoria in adult    Relevant Medications    testosterone cypionate (DEPOTESTOSTERONE) 200 MG/ML injection    Personal history of DVT (deep vein thrombosis) - Primary    Relevant Orders    Adult Cardiology Eval  Referral    Heterozygous factor V Leiden mutation (H)      1) Activated protein C resistance (APC-R) due   to heterozygous factor V Leiden F5 c.1601G>A; p.[id number]   (legacy numbering [id number]) variant, a mild risk factor   for venous thromboembolism          Relevant Orders    Adult Oncology/Hematology  Referral     Other Visit Diagnoses       Leg edema, right        Relevant Medications    loratadine (CLARITIN) 10 MG tablet    Other Relevant Orders    Adult Cardiology Eval  Referral    Recurrent major depressive disorder, in partial remission (H)            Right lower leg edema has been constant since 2018 when patient was diagnosed with a DVT.  They completed 6 months worth of anticoagulation and then that was discontinued.  They have had ongoing pain and swelling and was seen by a provider at [facility].  She they were waiting to be contacted to get an ultrasound done but that never happened.  Patient then had a follow-up appointment with their psychiatrist who went ahead and restarted patient on Xarelto.  Chart review shows that patient is heterozygous for factor V Leiden.  Was able to review a heme-onc note from 2023 that recommended that patient have post surgical DVT prophylaxis but there is no recommendation as to whether patient should continue with anticoagulation ongoing.  Will have patient follow-up with heme-onc to find out  "if they should continue to be on an anticoagulant long-term.  Will also send referral to cardiology to see if patient is a candidate for thrombolytics.  Placing an order for patient to get right lower extremity ultrasound.    Gender dysphoria we started testosterone treatment in May.  Recent labs show that you are in range but towards the lower limits of where we wanted testosterone levels to be at.  Will bump up testosterone from 75 mg every other week to 100 mg every other week and plan to follow-up in 2 months.    Depression patient reports that they are working with their psychiatrist and that for the past 2 weeks they have really been feeling pretty good.  They will continue with psychiatry.       The longitudinal plan of care for the diagnosis(es)/condition(s) as documented were addressed during this visit. Due to the added complexity in care, I will continue to support Brian in the subsequent management and with ongoing continuity of care.    BMI  Estimated body mass index is 39.49 kg/m  as calculated from the following:    Height as of 8/6/24: 1.727 m (5' 8\").    Weight as of 8/6/24: 117.8 kg (259 lb 11.2 oz).   Weight management plan: Continue therapeutic lifestyle changes          Subjective   Brian is a 29 year old, presenting for the following health issues:  No chief complaint on file.        10/29/2024    11:02 AM   Additional Questions   Roomed by Louisa     Video Start Time: 11:22 AM    HPI                 Objective           Vitals:  No vitals were obtained today due to virtual visit.    Physical Exam   GENERAL: alert and no distress  EYES: Eyes grossly normal to inspection.  No discharge or erythema, or obvious scleral/conjunctival abnormalities.  RESP: No audible wheeze, cough, or visible cyanosis.    SKIN: Visible skin clear. No significant rash, abnormal pigmentation or lesions.  NEURO: Cranial nerves grossly intact.  Mentation and speech appropriate for age.  PSYCH: Appropriate affect, tone, and " pace of words          Video-Visit Details    Type of service:  Video Visit   Video End Time:11:43 AM  Originating Location (pt. Location): Home    Distant Location (provider location):  On-site  [facility]  Signed Electronically by: Alysha Durham MD

## 2024-10-29 NOTE — ASSESSMENT & PLAN NOTE
1) Activated protein C resistance (APC-R) due   to heterozygous factor V Leiden F5 c.1601G>A; p.[id number]   (legacy numbering [id number]) variant, a mild risk factor   for venous thromboembolism

## 2024-10-30 ENCOUNTER — HOSPITAL ENCOUNTER (OUTPATIENT)
Dept: ULTRASOUND IMAGING | Facility: CLINIC | Age: 29
Discharge: HOME | End: 2024-10-30
Attending: FAMILY MEDICINE | Admitting: FAMILY MEDICINE
Payer: MEDICAID

## 2024-10-30 DIAGNOSIS — Z86.718 PERSONAL HISTORY OF DVT (DEEP VEIN THROMBOSIS): ICD-10-CM

## 2024-10-30 DIAGNOSIS — R60.0 LEG EDEMA, RIGHT: ICD-10-CM

## 2024-10-30 PROCEDURE — 93971 EXTREMITY STUDY: CPT | Mod: RT

## 2024-10-31 ENCOUNTER — PATIENT OUTREACH (OUTPATIENT)
Dept: ONCOLOGY | Facility: CLINIC | Age: 29
End: 2024-10-31
Payer: MEDICAID

## 2024-11-04 ENCOUNTER — TELEPHONE (OUTPATIENT)
Dept: FAMILY MEDICINE | Facility: CLINIC | Age: 29
End: 2024-11-04
Payer: MEDICAID

## 2024-11-04 NOTE — TELEPHONE ENCOUNTER
Spoke with patient and informed of results. No questions or concerns at this time.     ----- Message from Doreen Adair sent at 11/4/2024  3:47 PM CST -----  Please call patient with ultrasound result which was normal.  Please have him follow up with Dr. Durham with any additional questions or if symptoms not improving.     Doreen Adair MD on 11/4/2024 at 3:47 PM

## 2024-11-07 ENCOUNTER — OFFICE VISIT (OUTPATIENT)
Dept: CARDIOLOGY | Facility: CLINIC | Age: 29
End: 2024-11-07
Attending: FAMILY MEDICINE
Payer: MEDICAID

## 2024-11-07 VITALS
OXYGEN SATURATION: 98 % | BODY MASS INDEX: 37.3 KG/M2 | HEIGHT: 68 IN | DIASTOLIC BLOOD PRESSURE: 79 MMHG | RESPIRATION RATE: 16 BRPM | HEART RATE: 87 BPM | WEIGHT: 246.1 LBS | SYSTOLIC BLOOD PRESSURE: 120 MMHG | TEMPERATURE: 99.1 F

## 2024-11-07 DIAGNOSIS — D68.51 HETEROZYGOUS FACTOR V LEIDEN MUTATION (H): Primary | ICD-10-CM

## 2024-11-07 DIAGNOSIS — Z86.718 PERSONAL HISTORY OF DVT (DEEP VEIN THROMBOSIS): ICD-10-CM

## 2024-11-07 DIAGNOSIS — R60.0 LOCALIZED EDEMA: ICD-10-CM

## 2024-11-07 DIAGNOSIS — R60.0 LEG EDEMA, RIGHT: ICD-10-CM

## 2024-11-07 PROCEDURE — 99203 OFFICE O/P NEW LOW 30 MIN: CPT | Performed by: INTERNAL MEDICINE

## 2024-11-07 NOTE — PROGRESS NOTES
[facility]  [phone]          Assessment/Recommendations   Patient with history of DVT in the right leg with continued swelling of the right leg.  Suspect valve damage at the time of DVT with persistent edema.  No evidence of thrombus in the right lower extremity on recent lower extremity ultrasound.  We talked about various means to improve that and my biggest recommendation would be for a right lower extremity compression sock which I think could be quite helpful.  My sense is that surgical interventions would not be particularly helpful but could have her seen in the vascular clinic as well.  Thrombolytics would not be indicated at this time.    If she has recurrent DVTs or there is any concern for pulmonary embolus, I would have a low threshold for an echocardiogram to look at her right and left ventricular systolic function.    Above recommendations, suggestions discussed with patient.  No further cardiac evaluation required at this time but would be happy to see patient in the future if questions or problems arise.       History of Present Illness/Subjective    Mr. Dilia Shirley is a 29 year old adult with known history of deep venous thrombosis in 2018.  Patient had quite significant swelling of the right lower extremity at that time.  There is been persistent swelling since that time.  Patient thought that it would be better with weight loss and patient has lost 100 pounds after bariatric surgery but that swelling continues to be about the same, possibly mildly improved.  It bothers her to look at.  It is not painful.    She denies unusual shortness of breath, paroxysmal nocturnal dyspnea, orthopnea, and does have some orthostatic symptoms where she will feel lightheaded if she gets up quickly.  Patient does have diabetes, smokes 3 to 4 cigarettes a day, and is treated for increased cholesterol.  No family history of premature coronary artery disease and not treated for  "hypertension.    Patient grew up in [place] [facility] [place].  Now lives in [facility] [place].  Currently not working.       Physical Examination Review of Systems   /79 (BP Location: Left arm, Patient Position: Sitting, Cuff Size: Adult Regular)   Pulse 87   Temp 99.1  F (37.3  C) (Oral)   Resp 16   Ht 1.727 m (5' 8\")   Wt 111.6 kg (246 lb 1.6 oz)   LMP  (LMP Unknown)   SpO2 98%   BMI 37.42 kg/m    Body mass index is 37.42 kg/m .  Wt Readings from Last 3 Encounters:   11/07/24 111.6 kg (246 lb 1.6 oz)   08/06/24 117.8 kg (259 lb 11.2 oz)   05/06/24 123 kg (271 lb 1.6 oz)     General Appearance:   Alert, cooperative and in no acute distress.   ENT/Mouth: Pink/moist oral mucosa   EYES:  no scleral icterus, normal conjunctivae   Neck: JVP normal. No Hepatojugular reflux. Thyroid not visualized.   Chest/Lungs:   Lungs are clear to auscultation, equal chest wall expansion.   Cardiovascular:   S1, S2 without murmur ,clicks or rubs. Brachial, radial pulses are intact and symetric. No carotid bruits noted   Abdomen:  Nontender. BS+. No bruits.   Extremities: No cyanosis, clubbing.  Right lower extremity below the knee is significantly larger than the left.  There is no pitting edema but there is evidence of some extra fluid.   Skin: no xanthelasma, warm.    Neurologic: normal arm movement bilateral, no tremors     Psychiatric: Appropriate affect.      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 Social History     Socioeconomic History    Marital status: Single     Spouse name: Not on file    Number of children: Not on file    Years of education: Not on file    Highest education level: Not on file   Occupational History    Not on file   Tobacco Use    Smoking status: Never     Passive exposure: Never    Smokeless tobacco: Never   Vaping Use    Vaping status: Never Used   Substance and Sexual Activity    Alcohol use: Never    Drug use: Never    Sexual activity: Not on file   Other Topics Concern    Not on file   Social History Narrative    Not on file     Social Drivers of Health     Financial Resource Strain: Low Risk  (9/11/2024)    Received from [employer]    Financial Resource Strain     Difficulty of Paying Living Expenses: 3     Difficulty of Paying Living Expenses: Not on file   Food Insecurity: No Food Insecurity (9/11/2024)    Received from [employer]    Food Insecurity     Do you worry your food will run out before you are able to buy more?: 1   Transportation Needs: No Transportation Needs (9/11/2024)    Received from [employer]    Transportation Needs     Does lack of transportation keep you from medical appointments?: 1     Does lack of transportation keep you from work, meetings or getting things that you need?: 1   Physical Activity: Insufficiently Active (4/22/2024)    Exercise Vital Sign     Days of Exercise per Week: 2 days     Minutes of Exercise per Session: 20 min   Stress: Stress Concern Present (4/22/2024)    [place] [employer] - Occupational Stress Questionnaire     Feeling of Stress : Rather much   Social Connections: Socially Integrated (9/11/2024)    Received from [employer]    Social Connections     Do you often feel lonely or isolated from those  around you?: 0   Interpersonal Safety: Low Risk  (4/22/2024)    Interpersonal Safety     Do you feel physically and emotionally safe where you currently live?: Yes     Within the past 12 months, have you been hit, slapped, kicked or otherwise physically hurt by someone?: No     Within the past 12 months, have you been humiliated or emotionally abused in other ways by your partner or ex-partner?: No   Housing Stability: Low Risk  (9/11/2024)    Received from [employer] [facility]    Housing Stability     What is your housing situation today?: 1          Medications  Allergies   Current Outpatient Medications   Medication Sig Dispense Refill    nicotine polacrilex (NICORETTE) 4 MG gum CHEW 1 PIECE OF GUM INSIDE OF MOUTH EVERY 2 HOURS AS NEEDED FOR SMOKING CRAVINGS      acetaminophen (TYLENOL) 325 MG tablet Take 1,000 mg by mouth      albuterol (PROAIR RESPICLICK) 108 (90 Base) MCG/ACT inhaler Inhale 1-2 puffs into the lungs      albuterol (VENTOLIN HFA) 108 (90 Base) MCG/ACT inhaler INHALE 1 TO 2 PUFFS BY MOUTH EVERY 4 TO 6 HOURS AS NEEDED FOR COUGH OR SHORTNESS OF BREATH OR WHEEZING      ALPRAZolam (XANAX) 1 MG tablet TAKE 1/2 TO 1 TABLET BY MOUTH TWICE DAILY AS NEEDED FOR SEVERE ANXIETY OR PANIC ATTACKS      amitriptyline (ELAVIL) 150 MG tablet Take 150 mg by mouth      atorvastatin (LIPITOR) 40 MG tablet Take 40 mg by mouth      busPIRone (BUSPAR) 10 MG tablet TAKE 1 TO 2 TABLETS BY MOUTH TWICE DAILY AS NEEDED FOR ANXIETY RELATED TO MENSTRUATION      carBAMazepine (TEGRETOL XR) 100 MG 12 hr tablet Take 100 mg by mouth      clindamycin (CLEOCIN-T) 1 % external gel APPLY TOPICALLY TO FACE DAILY      famotidine (PEPCID) 20 MG tablet Take 20 mg by mouth      fluticasone-salmeterol (ADVAIR DISKUS) 500-50 MCG/ACT inhaler Inhale 1 puff into the lungs      hydrocortisone 2.5 % cream Apply  topically two times a day. Apply to affected area: bilateral ear canal for itching      lamoTRIgine (LAMICTAL)  "150 MG tablet TAKE 1 TABLET BY MOUTH ONCE DAILY AND 1/2 TABLET IN EVENING      levocetirizine (XYZAL) 5 MG tablet Take 5 mg by mouth      loratadine (CLARITIN) 10 MG tablet Take 1 tablet by mouth daily at 2 pm.      lubiprostone (AMITIZA) 8 MCG capsule TAKE 1 CAPSULE BY MOUTH TWICE DAILY FOR CONSTIPATION      montelukast (SINGULAIR) 10 MG tablet Take 10 mg by mouth      needle, disp, 18G X 1\" MISC Use to draw up hormones once weekly 25 each 3    Needle, Disp, 25G X 1-1/2\" MISC Use once weekly for administering hormone IM 25 each 3    omeprazole (PRILOSEC) 20 MG DR capsule Take 40 mg by mouth 2 times daily.      ONETOUCH ULTRA test strip USE AS DIRECTED TO TEST THREE TIMES DAILY      prazosin (MINIPRESS) 5 MG capsule Take 5 mg by mouth every evening      pregabalin (LYRICA) 300 MG capsule Take 300 mg by mouth      propranolol (INDERAL) 80 MG tablet Take 80 mg by mouth      RETIN-A 0.01 % external gel APPLY EXTERNALLY TO THE AFFECTED AREA ONCE A DAY AS NEEDED FOR ACNE TO CLEAN FACE AND NECK      REXULTI 0.5 MG tablet Take 1 tablet by mouth daily at 2 pm      Semaglutide, 1 MG/DOSE, (OZEMPIC) 4 MG/3ML pen Inject 1 mg subcutaneously every 7 days (Patient not taking: Reported on 10/29/2024) 3 mL 0    Semaglutide, 2 MG/DOSE, (OZEMPIC) 8 MG/3ML pen Inject 2 mg subcutaneously every 7 days 9 mL 3    senna-docusate (SENOKOT-S/PERICOLACE) 8.6-50 MG tablet Take 1 tablet by mouth 2 times daily      Sharps Container MISC 1 each every 3 months 1 each 3    syringe, disposable, 1 ML MISC Use once weekly to draw up hormones 25 each 3    testosterone cypionate (DEPOTESTOSTERONE) 200 MG/ML injection Inject 0.5 mLs (100 mg) into the muscle every 14 days. 7 mL 0    tiZANidine (ZANAFLEX) 4 MG tablet Take 4 mg by mouth      XARELTO ANTICOAGULANT 10 MG TABS tablet Take 1 tablet by mouth daily at 2 pm.      Allergies   Allergen Reactions    Psyllium Swelling and Other (See Comments)    Food Itching     Red Jello, ear and throat gets itchy    " "Benefiber Other (See Comments)     Caused an \"itchy\" feeling in throat and into chest after drinking         Lab Results    Chemistry/lipid CBC Cardiac Enzymes/BNP/TSH/INR   Lab Results   Component Value Date    CHOL 118 10/22/2024    HDL 40 10/22/2024    TRIG 183 (H) 10/22/2024    BUN 9.2 05/06/2024     05/06/2024    CO2 25 05/06/2024    Lab Results   Component Value Date    WBC 8.8 10/22/2024    HGB 14.9 10/22/2024    HCT 47.2 10/22/2024    MCV 93 10/22/2024     10/22/2024    Lab Results   Component Value Date    TSH 2.67 08/13/2019                                            "

## 2024-11-07 NOTE — LETTER
11/7/2024    Alysha Durham MD  [address] [facility] [address]    RE: Dilia Shirley       Dear Colleague,     I had the pleasure of seeing Dilia Shirley in the [facility].      [facility]  [phone]          Assessment/Recommendations   Patient with history of DVT in the right leg with continued swelling of the right leg.  Suspect valve damage at the time of DVT with persistent edema.  No evidence of thrombus in the right lower extremity on recent lower extremity ultrasound.  We talked about various means to improve that and my biggest recommendation would be for a right lower extremity compression sock which I think could be quite helpful.  My sense is that surgical interventions would not be particularly helpful but could have her seen in the vascular clinic as well.  Thrombolytics would not be indicated at this time.    If she has recurrent DVTs or there is any concern for pulmonary embolus, I would have a low threshold for an echocardiogram to look at her right and left ventricular systolic function.    Above recommendations, suggestions discussed with patient.  No further cardiac evaluation required at this time but would be happy to see patient in the future if questions or problems arise.       History of Present Illness/Subjective    Mr. Dilia Shirley is a 29 year old adult with known history of deep venous thrombosis in 2018.  Patient had quite significant swelling of the right lower extremity at that time.  There is been persistent swelling since that time.  Patient thought that it would be better with weight loss and patient has lost 100 pounds after bariatric surgery but that swelling continues to be about the same, possibly mildly improved.  It bothers her to look at.  It is not painful.    She denies unusual shortness of breath, paroxysmal nocturnal dyspnea, orthopnea, and does have some orthostatic symptoms where she will feel lightheaded if  "she gets up quickly.  Patient does have diabetes, smokes 3 to 4 cigarettes a day, and is treated for increased cholesterol.  No family history of premature coronary artery disease and not treated for hypertension.    Patient grew up in [place].  Now lives in [place].  Currently not working.       Physical Examination Review of Systems   /79 (BP Location: Left arm, Patient Position: Sitting, Cuff Size: Adult Regular)   Pulse 87   Temp 99.1  F (37.3  C) (Oral)   Resp 16   Ht 1.727 m (5' 8\")   Wt 111.6 kg (246 lb 1.6 oz)   LMP  (LMP Unknown)   SpO2 98%   BMI 37.42 kg/m    Body mass index is 37.42 kg/m .  Wt Readings from Last 3 Encounters:   11/07/24 111.6 kg (246 lb 1.6 oz)   08/06/24 117.8 kg (259 lb 11.2 oz)   05/06/24 123 kg (271 lb 1.6 oz)     General Appearance:   Alert, cooperative and in no acute distress.   ENT/Mouth: Pink/moist oral mucosa   EYES:  no scleral icterus, normal conjunctivae   Neck: JVP normal. No Hepatojugular reflux. Thyroid not visualized.   Chest/Lungs:   Lungs are clear to auscultation, equal chest wall expansion.   Cardiovascular:   S1, S2 without murmur ,clicks or rubs. Brachial, radial pulses are intact and symetric. No carotid bruits noted   Abdomen:  Nontender. BS+. No bruits.   Extremities: No cyanosis, clubbing.  Right lower extremity below the knee is significantly larger than the left.  There is no pitting edema but there is evidence of some extra fluid.   Skin: no xanthelasma, warm.    Neurologic: normal arm movement bilateral, no tremors     Psychiatric: Appropriate affect.      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 Social History     Socioeconomic History     Marital status: Single     Spouse name: Not on file     Number of children: Not on file     Years of education: Not on file     Highest education level: Not on file   Occupational History     Not on file   Tobacco Use     Smoking status: Never     Passive exposure: Never     Smokeless tobacco: Never   Vaping Use     Vaping status: Never Used   Substance and Sexual Activity     Alcohol use: Never     Drug use: Never     Sexual activity: Not on file   Other Topics Concern     Not on file   Social History Narrative     Not on file     Social Drivers of Health     Financial Resource Strain: Low Risk  (9/11/2024)    [facility]    Financial Resource Strain      Difficulty of Paying Living Expenses: 3      Difficulty of Paying Living Expenses: Not on file   Food Insecurity: No Food Insecurity (9/11/2024)    Received from [facility]    Food Insecurity      Do you worry your food will run out before you are able to buy more?: 1   Transportation Needs: No Transportation Needs (9/11/2024)    Received from [facility] [employer] [facility]    Transportation Needs      Does lack of transportation keep you from medical appointments?: 1      Does lack of transportation keep you from work, meetings or getting things that you need?: 1   Physical Activity: Insufficiently Active (4/22/2024)    Exercise Vital Sign      Days of Exercise per Week: 2 days      Minutes of Exercise per Session: 20 min   Stress: Stress Concern Present (4/22/2024)    [employer] - Occupational Stress  Questionnaire      Feeling of Stress : Rather much   Social Connections: Socially Integrated (9/11/2024)    Received from [facility]    Social Connections      Do you often feel lonely or isolated from those around you?: 0   Interpersonal Safety: Low Risk  (4/22/2024)    Interpersonal Safety      Do you feel physically and emotionally safe where you currently live?: Yes      Within the past 12 months, have you been hit, slapped, kicked or otherwise physically hurt by someone?: No      Within the past 12 months, have you been humiliated or emotionally abused in other ways by your partner or ex-partner?: No   Housing Stability: Low Risk  (9/11/2024)    Received from [facility]    Housing Stability      What is your housing situation today?: 1          Medications  Allergies   Current Outpatient Medications   Medication Sig Dispense Refill     nicotine polacrilex (NICORETTE) 4 MG gum CHEW 1 PIECE OF GUM INSIDE OF MOUTH EVERY 2 HOURS AS NEEDED FOR SMOKING CRAVINGS       acetaminophen (TYLENOL) 325 MG tablet Take 1,000 mg by mouth       albuterol (PROAIR RESPICLICK) 108 (90 Base) MCG/ACT inhaler Inhale 1-2 puffs into the lungs       albuterol (VENTOLIN HFA) 108 (90 Base) MCG/ACT inhaler INHALE 1 TO 2 PUFFS BY MOUTH EVERY 4 TO 6 HOURS AS NEEDED FOR COUGH OR SHORTNESS OF BREATH OR WHEEZING       ALPRAZolam (XANAX) 1 MG tablet TAKE 1/2 TO 1 TABLET BY MOUTH TWICE DAILY AS NEEDED FOR SEVERE ANXIETY OR PANIC ATTACKS       amitriptyline (ELAVIL) 150 MG tablet Take 150 mg by mouth       atorvastatin (LIPITOR) 40 MG tablet Take 40 mg by mouth       busPIRone (BUSPAR) 10 MG tablet TAKE 1 TO 2 TABLETS BY MOUTH TWICE DAILY AS NEEDED FOR ANXIETY RELATED TO MENSTRUATION       carBAMazepine (TEGRETOL XR) 100 MG 12 hr tablet Take 100 mg by mouth       clindamycin (CLEOCIN-T) 1 % external gel APPLY TOPICALLY TO FACE DAILY       famotidine (PEPCID) 20 MG tablet Take 20 mg  "by mouth       fluticasone-salmeterol (ADVAIR DISKUS) 500-50 MCG/ACT inhaler Inhale 1 puff into the lungs       hydrocortisone 2.5 % cream Apply  topically two times a day. Apply to affected area: bilateral ear canal for itching       lamoTRIgine (LAMICTAL) 150 MG tablet TAKE 1 TABLET BY MOUTH ONCE DAILY AND 1/2 TABLET IN EVENING       levocetirizine (XYZAL) 5 MG tablet Take 5 mg by mouth       loratadine (CLARITIN) 10 MG tablet Take 1 tablet by mouth daily at 2 pm.       lubiprostone (AMITIZA) 8 MCG capsule TAKE 1 CAPSULE BY MOUTH TWICE DAILY FOR CONSTIPATION       montelukast (SINGULAIR) 10 MG tablet Take 10 mg by mouth       needle, disp, 18G X 1\" MISC Use to draw up hormones once weekly 25 each 3     Needle, Disp, 25G X 1-1/2\" MISC Use once weekly for administering hormone IM 25 each 3     omeprazole (PRILOSEC) 20 MG DR capsule Take 40 mg by mouth 2 times daily.       ONETOUCH ULTRA test strip USE AS DIRECTED TO TEST THREE TIMES DAILY       prazosin (MINIPRESS) 5 MG capsule Take 5 mg by mouth every evening       pregabalin (LYRICA) 300 MG capsule Take 300 mg by mouth       propranolol (INDERAL) 80 MG tablet Take 80 mg by mouth       RETIN-A 0.01 % external gel APPLY EXTERNALLY TO THE AFFECTED AREA ONCE A DAY AS NEEDED FOR ACNE TO CLEAN FACE AND NECK       REXULTI 0.5 MG tablet Take 1 tablet by mouth daily at 2 pm       Semaglutide, 1 MG/DOSE, (OZEMPIC) 4 MG/3ML pen Inject 1 mg subcutaneously every 7 days (Patient not taking: Reported on 10/29/2024) 3 mL 0     Semaglutide, 2 MG/DOSE, (OZEMPIC) 8 MG/3ML pen Inject 2 mg subcutaneously every 7 days 9 mL 3     senna-docusate (SENOKOT-S/PERICOLACE) 8.6-50 MG tablet Take 1 tablet by mouth 2 times daily       Sharps Container MISC 1 each every 3 months 1 each 3     syringe, disposable, 1 ML MISC Use once weekly to draw up hormones 25 each 3     testosterone cypionate (DEPOTESTOSTERONE) 200 MG/ML injection Inject 0.5 mLs (100 mg) into the muscle every 14 days. 7 mL 0     " "tiZANidine (ZANAFLEX) 4 MG tablet Take 4 mg by mouth       XARELTO ANTICOAGULANT 10 MG TABS tablet Take 1 tablet by mouth daily at 2 pm.      Allergies   Allergen Reactions     Psyllium Swelling and Other (See Comments)     Food Itching     Red Jello, ear and throat gets itchy     Benefiber Other (See Comments)     Caused an \"itchy\" feeling in throat and into chest after drinking         Lab Results    Chemistry/lipid CBC Cardiac Enzymes/BNP/TSH/INR   Lab Results   Component Value Date    CHOL 118 10/22/2024    HDL 40 10/22/2024    TRIG 183 (H) 10/22/2024    BUN 9.2 05/06/2024     05/06/2024    CO2 25 05/06/2024    Lab Results   Component Value Date    WBC 8.8 10/22/2024    HGB 14.9 10/22/2024    HCT 47.2 10/22/2024    MCV 93 10/22/2024     10/22/2024    Lab Results   Component Value Date    TSH 2.67 08/13/2019                                                Thank you for allowing me to participate in the care of your patient.      Sincerely,     Nixon Manley MD     [facility]  cc:   Alysha Durham MD  [address] [place] [id number]      "

## 2024-11-21 ENCOUNTER — OFFICE VISIT (OUTPATIENT)
Dept: FAMILY MEDICINE | Facility: CLINIC | Age: 29
End: 2024-11-21
Attending: FAMILY MEDICINE
Payer: MEDICAID

## 2024-11-21 VITALS
RESPIRATION RATE: 16 BRPM | BODY MASS INDEX: 37.5 KG/M2 | HEIGHT: 68 IN | DIASTOLIC BLOOD PRESSURE: 80 MMHG | HEART RATE: 79 BPM | TEMPERATURE: 97.8 F | WEIGHT: 247.4 LBS | SYSTOLIC BLOOD PRESSURE: 124 MMHG | OXYGEN SATURATION: 94 %

## 2024-11-21 DIAGNOSIS — Z98.84 STATUS POST BARIATRIC SURGERY: ICD-10-CM

## 2024-11-21 DIAGNOSIS — E11.9 TYPE 2 DIABETES MELLITUS WITHOUT COMPLICATION, WITHOUT LONG-TERM CURRENT USE OF INSULIN (H): Primary | ICD-10-CM

## 2024-11-21 DIAGNOSIS — F64.0 GENDER DYSPHORIA IN ADULT: ICD-10-CM

## 2024-11-21 RX ORDER — TESTOSTERONE CYPIONATE 200 MG/ML
150 INJECTION, SOLUTION INTRAMUSCULAR
Qty: 7 ML | Refills: 0 | Status: SHIPPED | OUTPATIENT
Start: 2024-11-21

## 2024-11-21 ASSESSMENT — ANXIETY QUESTIONNAIRES
8. IF YOU CHECKED OFF ANY PROBLEMS, HOW DIFFICULT HAVE THESE MADE IT FOR YOU TO DO YOUR WORK, TAKE CARE OF THINGS AT HOME, OR GET ALONG WITH OTHER PEOPLE?: VERY DIFFICULT
5. BEING SO RESTLESS THAT IT IS HARD TO SIT STILL: SEVERAL DAYS
6. BECOMING EASILY ANNOYED OR IRRITABLE: SEVERAL DAYS
7. FEELING AFRAID AS IF SOMETHING AWFUL MIGHT HAPPEN: SEVERAL DAYS
GAD7 TOTAL SCORE: 9
4. TROUBLE RELAXING: SEVERAL DAYS
GAD7 TOTAL SCORE: 9
3. WORRYING TOO MUCH ABOUT DIFFERENT THINGS: MORE THAN HALF THE DAYS
2. NOT BEING ABLE TO STOP OR CONTROL WORRYING: SEVERAL DAYS
GAD7 TOTAL SCORE: 9
IF YOU CHECKED OFF ANY PROBLEMS ON THIS QUESTIONNAIRE, HOW DIFFICULT HAVE THESE PROBLEMS MADE IT FOR YOU TO DO YOUR WORK, TAKE CARE OF THINGS AT HOME, OR GET ALONG WITH OTHER PEOPLE: VERY DIFFICULT
1. FEELING NERVOUS, ANXIOUS, OR ON EDGE: MORE THAN HALF THE DAYS
7. FEELING AFRAID AS IF SOMETHING AWFUL MIGHT HAPPEN: SEVERAL DAYS

## 2024-11-21 ASSESSMENT — PATIENT HEALTH QUESTIONNAIRE - PHQ9
SUM OF ALL RESPONSES TO PHQ QUESTIONS 1-9: 11
SUM OF ALL RESPONSES TO PHQ QUESTIONS 1-9: 11
10. IF YOU CHECKED OFF ANY PROBLEMS, HOW DIFFICULT HAVE THESE PROBLEMS MADE IT FOR YOU TO DO YOUR WORK, TAKE CARE OF THINGS AT HOME, OR GET ALONG WITH OTHER PEOPLE: VERY DIFFICULT

## 2024-11-21 NOTE — PROGRESS NOTES
Assessment & Plan   Problem List Items Addressed This Visit       Diabetes mellitus, type 2 (H) - Primary    Gender dysphoria in adult    Relevant Medications    testosterone cypionate (DEPOTESTOSTERONE) 200 MG/ML injection    Status post bariatric surgery    Last eye exam 10/28/2024 no diabetic retinopathy      Gender dysphoria results from recent lab work show that testosterone is in range however patient would like to increase it and we do have some room for that.  They have been doing 100 mg/week.  We will increase this to 150 mg/week.  Will have patient get labs repeated in approximately 2 months and then schedule a follow-up appointment.    History of DVT patient is currently on Xarelto however review of recent cardiology note reveals that they recommended patient use a compression hose and no mention of need for DVT.  If patient starts using the compression hose and finds that it is helpful wonderful.  If not then we can also place a referral to vascular surgery.  Copy of cardiology note was printed out for patient so they can share with their psychiatrist who is currently prescribing the Xarelto for patient.  Patient recently had a pretty heavy.  Likely at least an partially contributable to the Xarelto.    Diabetes has been well-controlled.  Patient is on Ozempic.  They have been placed on Amitiza for constipation which seems to be finally starting to work.  They could also consider taking bisacodyl.      The longitudinal plan of care for the diagnosis(es)/condition(s) as documented were addressed during this visit. Due to the added complexity in care, I will continue to support Brian in the subsequent management and with ongoing continuity of care.            Subjective   Brian is a 29 year old, presenting for the following health issues:  Recheck Medication and Musculoskeletal Problem (Pt c/o swelling R leg follow up taking blood thinners. )        11/21/2024    10:07 AM   Additional Questions   Roomed by  "Louisa     Via the Health Maintenance questionnaire, the patient has reported the following services have been completed -Eye Exam: [employer] 2024-10-03, this information has been sent to the abstraction team.  History of Present Illness       Reason for visit:  Follow up with testosterone. Follow up on right leg.    He eats 0-1 servings of fruits and vegetables daily.He consumes 0 sweetened beverage(s) daily.He exercises with enough effort to increase his heart rate 9 or less minutes per day.  He exercises with enough effort to increase his heart rate 5 days per week. He is missing 1 dose(s) of medications per week.  He is not taking prescribed medications regularly due to remembering to take.                     Objective    /80 (BP Location: Right arm, Patient Position: Sitting)   Pulse 79   Temp 97.8  F (36.6  C) (Tympanic)   Resp 16   Ht 1.727 m (5' 8\")   Wt 112.2 kg (247 lb 6.4 oz)   LMP  (LMP Unknown)   SpO2 94%   BMI 37.62 kg/m    Body mass index is 37.62 kg/m .  Physical Exam               Signed Electronically by: Alysha Durham MD    "

## 2024-12-27 ENCOUNTER — TELEPHONE (OUTPATIENT)
Dept: FAMILY MEDICINE | Facility: CLINIC | Age: 29
End: 2024-12-27
Payer: MEDICAID

## 2024-12-27 NOTE — TELEPHONE ENCOUNTER
Semaglutide, 2 MG/DOSE, (OZEMPIC) 8 MG/3ML pen 2 mg, EVERY 7 DAYS    Prior Authorization Retail Medication Request    Medication/Dose:   Diagnosis and ICD code (if different than what is on RX):    New/renewal/insurance change PA/secondary ins. PA:  Previously Tried and Failed:    Rationale:      Insurance   Primary:   Insurance ID:      Secondary (if applicable):  Insurance ID:      Pharmacy Information (if different than what is on RX)  Name:    Phone:    Fax:    Clinic Information  Preferred routing pool for dept communication:

## 2024-12-30 NOTE — TELEPHONE ENCOUNTER
PA Initiation    Medication: OZEMPIC (2 MG/DOSE) 8 MG/3ML SC SOPN  Insurance Company: [employer] ([facility]) - Phone [phone] Fax [phone]  Pharmacy Filling the Rx: [id number] [place] [address] [address] [facility] AT [facility] & [address]  Filling Pharmacy Phone: [phone]  Filling Pharmacy Fax: [phone]  Start Date: 12/30/2024

## 2025-01-02 NOTE — TELEPHONE ENCOUNTER
Contacted pharmacy to follow up on request.  Per pharmacy they have not submitted it to insurance yet and will be sending in today.    Skyrizi Counseling: I discussed with the patient the risks of risankizumab-rzaa including but not limited to immunosuppression, and serious infections.  The patient understands that monitoring is required including a PPD at baseline and must alert us or the primary physician if symptoms of infection or other concerning signs are noted.

## 2025-01-07 ENCOUNTER — VIRTUAL VISIT (OUTPATIENT)
Dept: FAMILY MEDICINE | Facility: CLINIC | Age: 30
End: 2025-01-07
Attending: FAMILY MEDICINE
Payer: MEDICAID

## 2025-01-07 DIAGNOSIS — F64.0 GENDER DYSPHORIA IN ADULT: ICD-10-CM

## 2025-01-07 DIAGNOSIS — Z51.81 ENCOUNTER FOR THERAPEUTIC DRUG MONITORING: ICD-10-CM

## 2025-01-07 DIAGNOSIS — E11.9 TYPE 2 DIABETES MELLITUS WITHOUT COMPLICATION, WITHOUT LONG-TERM CURRENT USE OF INSULIN (H): Primary | ICD-10-CM

## 2025-01-07 PROCEDURE — 98006 SYNCH AUDIO-VIDEO EST MOD 30: CPT | Performed by: FAMILY MEDICINE

## 2025-01-07 RX ORDER — TESTOSTERONE CYPIONATE 200 MG/ML
150 INJECTION, SOLUTION INTRAMUSCULAR
Qty: 7 ML | Refills: 0 | Status: SHIPPED | OUTPATIENT
Start: 2025-01-07

## 2025-01-07 NOTE — PROGRESS NOTES
Brian is a 29 year old who is being evaluated via a billable video visit.    How would you like to obtain your AVS? MyChart  If the video visit is dropped, the invitation should be resent by: Text to cell phone: [phone]  Will anyone else be joining your video visit? No      Assessment & Plan   Problem List Items Addressed This Visit       Diabetes mellitus, type 2 (H) - Primary    Relevant Medications    dulaglutide (TRULICITY) 1.5 MG/0.5ML pen    Dulaglutide 3 MG/0.5ML SOAJ (Start on 2/4/2025)    Dulaglutide 4.5 MG/0.5ML SOAJ (Start on 3/4/2025)    Gender dysphoria in adult    Relevant Medications    testosterone cypionate (DEPOTESTOSTERONE) 200 MG/ML injection    Other Relevant Orders    Testosterone Free and Total    CBC with Platelets & Differential    Lipid panel reflex to direct LDL Fasting    Estradiol     Other Visit Diagnoses       Encounter for therapeutic drug monitoring        Relevant Orders    Testosterone Free and Total    CBC with Platelets & Differential    Lipid panel reflex to direct LDL Fasting    Estradiol           Patient shares with me that they have type 2 diabetes.  They are primary care provider has tried to order their Trulicity but apparently the order has not gone through.  They need to switch from Ozempic 2 mg/week to Trulicity due to insurance preferences.  Will send over prescription for Trulicity 1.5 mg/week x 4 weeks then 3 mg/week x 4 weeks then 4.5 mg/week x 4 weeks with some refills.  Did asked Brian to ask their primary care provider to send me some notes so that I know that there hemoglobin A1c and other diabetes care are updated and so that I do not have to duplicate this for them.    Gender dysphoria at our last visit we increased patient's testosterone.  They report that they have noticed a significant change.  They are now having some changes with her voice and they have had some increased acne.  They do not feel like the acne needs to have any other treatment at this  time.  They also do not feel like we need to make any changes in their testosterone dosing.  I have ordered standing labs for testosterone, CBC, lipids and estradiol.  Given patient's previous history of blood clots I think it will be important for us to keep an eye on CBCs.  Will plan to have patient follow-up in 3 months but sooner if needed.          The longitudinal plan of care for the diagnosis(es)/condition(s) as documented were addressed during this visit. Due to the added complexity in care, I will continue to support Brian in the subsequent management and with ongoing continuity of care.        5/6/2024     1:56 PM 11/21/2024     9:41 AM   JENNY-7 SCORE   Total Score  9 (mild anxiety)   Total Score 10 9        Patient-reported            8/20/2024    11:56 AM 10/29/2024    11:14 AM 11/21/2024     9:40 AM   PHQ   PHQ-9 Total Score 22 17 11    Q9: Thoughts of better off dead/self-harm past 2 weeks Several days Not at all Not at all       Patient-reported        The longitudinal plan of care for the diagnosis(es)/condition(s) as documented were addressed during this visit. Due to the added complexity in care, I will continue to support Brian in the subsequent management and with ongoing continuity of care.            Subjective   Brian is a 29 year old, presenting for the following health issues:  No chief complaint on file.        1/7/2025    11:54 AM   Additional Questions   Roomed by Louisa     Video Start Time: 12:17 PM    HPI                 Objective           Vitals:  No vitals were obtained today due to virtual visit.    Physical Exam   GENERAL: alert and no distress  EYES: Eyes grossly normal to inspection.  No discharge or erythema, or obvious scleral/conjunctival abnormalities.  RESP: No audible wheeze, cough, or visible cyanosis.    SKIN: Visible skin clear. No significant rash, abnormal pigmentation or lesions.  NEURO: Cranial nerves grossly intact.  Mentation and speech appropriate for age.  PSYCH:  Appropriate affect, tone, and pace of words          Video-Visit Details    Type of service:  Video Visit   Video End Time:12:33 PM  Originating Location (pt. Location): Home    Distant Location (provider location):  On-site  Platform used for Video Visit: Tiffanie  Signed Electronically by: Alysha Durham MD

## 2025-01-08 NOTE — TELEPHONE ENCOUNTER
Prior Authorization Not Processed    Medication: OZEMPIC (2 MG/DOSE) 8 MG/3ML SC SOPN  Insurance Company: [employer] ([facility]) - Phone [phone] Fax [phone]  Expected CoPay: $    Pharmacy Filling the Rx: [id number] - [place], [place] - [address] AT [facility] & [facility]  Pharmacy Notified:   Patient Notified:     Per pharmacy provider discontinued script and sent trulicity.

## 2025-02-04 ENCOUNTER — LAB (OUTPATIENT)
Dept: LAB | Facility: CLINIC | Age: 30
End: 2025-02-04
Payer: MEDICAID

## 2025-02-04 DIAGNOSIS — E11.9 DIABETES MELLITUS, TYPE 2 (H): Primary | ICD-10-CM

## 2025-02-04 DIAGNOSIS — F64.0 GENDER DYSPHORIA IN ADULT: ICD-10-CM

## 2025-02-04 DIAGNOSIS — Z51.81 ENCOUNTER FOR THERAPEUTIC DRUG MONITORING: ICD-10-CM

## 2025-02-04 LAB
ALBUMIN SERPL BCG-MCNC: 4.3 G/DL (ref 3.5–5.2)
ALP SERPL-CCNC: 96 U/L (ref 40–150)
ALT SERPL W P-5'-P-CCNC: 20 U/L (ref 0–70)
AST SERPL W P-5'-P-CCNC: 20 U/L (ref 0–45)
BASOPHILS # BLD AUTO: 0 10E3/UL (ref 0–0.2)
BASOPHILS NFR BLD AUTO: 0 %
BILIRUB DIRECT SERPL-MCNC: <0.2 MG/DL (ref 0–0.3)
BILIRUB SERPL-MCNC: 0.4 MG/DL
CHOLEST SERPL-MCNC: 111 MG/DL
EOSINOPHIL # BLD AUTO: 0.1 10E3/UL (ref 0–0.7)
EOSINOPHIL NFR BLD AUTO: 1 %
ERYTHROCYTE [DISTWIDTH] IN BLOOD BY AUTOMATED COUNT: 12.6 % (ref 10–15)
EST. AVERAGE GLUCOSE BLD GHB EST-MCNC: 128 MG/DL
ESTRADIOL SERPL-MCNC: 38 PG/ML
FASTING STATUS PATIENT QL REPORTED: YES
HBA1C MFR BLD: 6.1 % (ref 0–5.6)
HCT VFR BLD AUTO: 50.4 % (ref 35–53)
HDLC SERPL-MCNC: 45 MG/DL
HGB BLD-MCNC: 16.3 G/DL (ref 11.7–17.7)
IMM GRANULOCYTES # BLD: 0 10E3/UL
IMM GRANULOCYTES NFR BLD: 0 %
LDLC SERPL CALC-MCNC: 47 MG/DL
LYMPHOCYTES # BLD AUTO: 3.6 10E3/UL (ref 0.8–5.3)
LYMPHOCYTES NFR BLD AUTO: 34 %
MCH RBC QN AUTO: 30.1 PG (ref 26.5–33)
MCHC RBC AUTO-ENTMCNC: 32.3 G/DL (ref 31.5–36.5)
MCV RBC AUTO: 93 FL (ref 78–100)
MONOCYTES # BLD AUTO: 0.6 10E3/UL (ref 0–1.3)
MONOCYTES NFR BLD AUTO: 5 %
NEUTROPHILS # BLD AUTO: 6.2 10E3/UL (ref 1.6–8.3)
NEUTROPHILS NFR BLD AUTO: 59 %
NONHDLC SERPL-MCNC: 66 MG/DL
PLATELET # BLD AUTO: 250 10E3/UL (ref 150–450)
PROT SERPL-MCNC: 7.4 G/DL (ref 6.4–8.3)
RBC # BLD AUTO: 5.42 10E6/UL (ref 3.8–5.9)
SHBG SERPL-SCNC: 35 NMOL/L (ref 11–135)
TRIGL SERPL-MCNC: 95 MG/DL
WBC # BLD AUTO: 10.5 10E3/UL (ref 4–11)

## 2025-02-04 PROCEDURE — 85025 COMPLETE CBC W/AUTO DIFF WBC: CPT | Mod: QW

## 2025-02-04 PROCEDURE — 82670 ASSAY OF TOTAL ESTRADIOL: CPT

## 2025-02-04 PROCEDURE — 83036 HEMOGLOBIN GLYCOSYLATED A1C: CPT

## 2025-02-04 PROCEDURE — 80076 HEPATIC FUNCTION PANEL: CPT

## 2025-02-04 PROCEDURE — 84270 ASSAY OF SEX HORMONE GLOBUL: CPT

## 2025-02-04 PROCEDURE — 80061 LIPID PANEL: CPT

## 2025-02-04 PROCEDURE — 36415 COLL VENOUS BLD VENIPUNCTURE: CPT

## 2025-02-10 ENCOUNTER — OFFICE VISIT (OUTPATIENT)
Dept: FAMILY MEDICINE | Facility: CLINIC | Age: 30
End: 2025-02-10
Payer: MEDICAID

## 2025-02-10 VITALS
WEIGHT: 256 LBS | BODY MASS INDEX: 38.8 KG/M2 | HEIGHT: 68 IN | DIASTOLIC BLOOD PRESSURE: 75 MMHG | RESPIRATION RATE: 20 BRPM | SYSTOLIC BLOOD PRESSURE: 109 MMHG | TEMPERATURE: 96.7 F | HEART RATE: 72 BPM | OXYGEN SATURATION: 96 %

## 2025-02-10 DIAGNOSIS — E11.9 TYPE 2 DIABETES MELLITUS WITHOUT COMPLICATION, WITHOUT LONG-TERM CURRENT USE OF INSULIN (H): ICD-10-CM

## 2025-02-10 DIAGNOSIS — E34.9 HORMONE DEFICIENCY: Primary | ICD-10-CM

## 2025-02-10 DIAGNOSIS — Z71.89 ACP (ADVANCE CARE PLANNING): ICD-10-CM

## 2025-02-10 PROCEDURE — 99214 OFFICE O/P EST MOD 30 MIN: CPT | Performed by: FAMILY MEDICINE

## 2025-02-10 PROCEDURE — G2211 COMPLEX E/M VISIT ADD ON: HCPCS | Performed by: FAMILY MEDICINE

## 2025-02-10 RX ORDER — BUPROPION HYDROCHLORIDE 150 MG/1
1 TABLET, EXTENDED RELEASE ORAL
COMMUNITY
Start: 2025-02-05

## 2025-02-10 RX ORDER — TESTOSTERONE CYPIONATE 200 MG/ML
200 INJECTION, SOLUTION INTRAMUSCULAR
Qty: 7 ML | Refills: 0 | Status: SHIPPED | OUTPATIENT
Start: 2025-02-10

## 2025-02-10 RX ORDER — SEMAGLUTIDE 2.68 MG/ML
2 INJECTION, SOLUTION SUBCUTANEOUS
COMMUNITY
Start: 2025-01-28

## 2025-02-10 RX ORDER — LINACLOTIDE 145 UG/1
145 CAPSULE, GELATIN COATED ORAL DAILY
COMMUNITY
Start: 2025-01-06

## 2025-02-10 ASSESSMENT — PATIENT HEALTH QUESTIONNAIRE - PHQ9
SUM OF ALL RESPONSES TO PHQ QUESTIONS 1-9: 9
SUM OF ALL RESPONSES TO PHQ QUESTIONS 1-9: 9
10. IF YOU CHECKED OFF ANY PROBLEMS, HOW DIFFICULT HAVE THESE PROBLEMS MADE IT FOR YOU TO DO YOUR WORK, TAKE CARE OF THINGS AT HOME, OR GET ALONG WITH OTHER PEOPLE: SOMEWHAT DIFFICULT

## 2025-02-10 ASSESSMENT — ANXIETY QUESTIONNAIRES
2. NOT BEING ABLE TO STOP OR CONTROL WORRYING: MORE THAN HALF THE DAYS
IF YOU CHECKED OFF ANY PROBLEMS ON THIS QUESTIONNAIRE, HOW DIFFICULT HAVE THESE PROBLEMS MADE IT FOR YOU TO DO YOUR WORK, TAKE CARE OF THINGS AT HOME, OR GET ALONG WITH OTHER PEOPLE: SOMEWHAT DIFFICULT
GAD7 TOTAL SCORE: 12
7. FEELING AFRAID AS IF SOMETHING AWFUL MIGHT HAPPEN: MORE THAN HALF THE DAYS
GAD7 TOTAL SCORE: 12
1. FEELING NERVOUS, ANXIOUS, OR ON EDGE: MORE THAN HALF THE DAYS
4. TROUBLE RELAXING: SEVERAL DAYS
GAD7 TOTAL SCORE: 12
8. IF YOU CHECKED OFF ANY PROBLEMS, HOW DIFFICULT HAVE THESE MADE IT FOR YOU TO DO YOUR WORK, TAKE CARE OF THINGS AT HOME, OR GET ALONG WITH OTHER PEOPLE?: SOMEWHAT DIFFICULT
7. FEELING AFRAID AS IF SOMETHING AWFUL MIGHT HAPPEN: MORE THAN HALF THE DAYS
5. BEING SO RESTLESS THAT IT IS HARD TO SIT STILL: SEVERAL DAYS
6. BECOMING EASILY ANNOYED OR IRRITABLE: MORE THAN HALF THE DAYS
3. WORRYING TOO MUCH ABOUT DIFFERENT THINGS: MORE THAN HALF THE DAYS

## 2025-02-10 ASSESSMENT — ASTHMA QUESTIONNAIRES
QUESTION_3 LAST FOUR WEEKS HOW OFTEN DID YOUR ASTHMA SYMPTOMS (WHEEZING, COUGHING, SHORTNESS OF BREATH, CHEST TIGHTNESS OR PAIN) WAKE YOU UP AT NIGHT OR EARLIER THAN USUAL IN THE MORNING: NOT AT ALL
QUESTION_1 LAST FOUR WEEKS HOW MUCH OF THE TIME DID YOUR ASTHMA KEEP YOU FROM GETTING AS MUCH DONE AT WORK, SCHOOL OR AT HOME: NONE OF THE TIME
QUESTION_5 LAST FOUR WEEKS HOW WOULD YOU RATE YOUR ASTHMA CONTROL: COMPLETELY CONTROLLED
ACT_TOTALSCORE: 25
QUESTION_4 LAST FOUR WEEKS HOW OFTEN HAVE YOU USED YOUR RESCUE INHALER OR NEBULIZER MEDICATION (SUCH AS ALBUTEROL): NOT AT ALL
ACT_TOTALSCORE: 25
QUESTION_2 LAST FOUR WEEKS HOW OFTEN HAVE YOU HAD SHORTNESS OF BREATH: NOT AT ALL

## 2025-02-10 ASSESSMENT — ENCOUNTER SYMPTOMS: NERVOUS/ANXIOUS: 1

## 2025-02-10 NOTE — PROGRESS NOTES
Assessment & Plan   Problem List Items Addressed This Visit       Hormone deficiency - Primary    Relevant Medications    testosterone cypionate (DEPOTESTOSTERONE) 200 MG/ML injection     Other Visit Diagnoses       ACP (advance care planning)            Hormone deficiency recent .  Patient feels like they are getting facial hair.  They would like to increase the testosterone dose if possible.  Right now they are getting 150 mg every 2 weeks will increase to 200 labs reviewed milligrams every 2 weeks and plan to recheck labs in approximately 3 months and then follow-up afterwards.  Diabetes mellitus  Patient continues on their Ozempic.  They see a different provider for prescribing their Ozempic    Depression and anxiety patient continues to work with her psych provider.  They are able to contract against suicide.      The longitudinal plan of care for the diagnosis(es)/condition(s) as documented were addressed during this visit. Due to the added complexity in care, I will continue to support Brian in the subsequent management and with ongoing continuity of care.          10/29/2024    11:14 AM 11/21/2024     9:40 AM 2/10/2025     9:32 AM   PHQ   PHQ-9 Total Score 17 11  9    Q9: Thoughts of better off dead/self-harm past 2 weeks Not at all Not at all Not at all       Patient-reported          5/6/2024     1:56 PM 11/21/2024     9:41 AM 2/10/2025     9:33 AM   JENNY-7 SCORE   Total Score  9 (mild anxiety) 12 (moderate anxiety)   Total Score 10 9  12        Patient-reported        Working with psychiatrist on mood      Diabetic foot exam: normal DP and PT pulses, no trophic changes or ulcerative lesions, and normal sensory exam            Subjective   Brian is a 29 year old, presenting for the following health issues:  Recheck Medication, Anxiety, Depression (Pt here for a testosterone,anxiety and depression medcheck), and Diabetes        2/10/2025     9:55 AM   Additional Questions   Roomed by Ortega BARILLAS     History of  Present Illness       Reason for visit:  Testosterone    He eats 0-1 servings of fruits and vegetables daily.He consumes 0 sweetened beverage(s) daily.He exercises with enough effort to increase his heart rate 9 or less minutes per day.  He exercises with enough effort to increase his heart rate 3 or less days per week. He is missing 1 dose(s) of medications per week.         Diabetes Follow-up    How often are you checking your blood sugar? A few times a month  What time of day are you checking your blood sugars (select all that apply)?  Before meals  Have you had any blood sugars above 200?  No  Have you had any blood sugars below 70?  No  What symptoms do you notice when your blood sugar is low?  Shaky  What concerns do you have today about your diabetes? None   Do you have any of these symptoms? (Select all that apply)  No numbness or tingling in feet.  No redness, sores or blisters on feet.  No complaints of excessive thirst.  No reports of blurry vision.  No significant changes to weight.      BP Readings from Last 2 Encounters:   02/10/25 109/75   11/21/24 124/80     Hemoglobin A1C (%)   Date Value   02/04/2025 6.1 (H)   10/22/2024 6.2 (H)     LDL Cholesterol Calculated (mg/dL)   Date Value   02/04/2025 47   10/22/2024 41             Depression and Anxiety   How are you doing with your depression since your last visit? No change  How are you doing with your anxiety since your last visit?  No change  Are you having other symptoms that might be associated with depression or anxiety? No  Have you had a significant life event? Health Concerns   Do you have any concerns with your use of alcohol or other drugs? No    Social History     Tobacco Use    Smoking status: Never     Passive exposure: Never    Smokeless tobacco: Never   Vaping Use    Vaping status: Never Used   Substance Use Topics    Alcohol use: Never    Drug use: Never         10/29/2024    11:14 AM 11/21/2024     9:40 AM 2/10/2025     9:32 AM   PHQ  "  PHQ-9 Total Score 17 11  9    Q9: Thoughts of better off dead/self-harm past 2 weeks Not at all Not at all Not at all       Patient-reported         5/6/2024     1:56 PM 11/21/2024     9:41 AM 2/10/2025     9:33 AM   JENNY-7 SCORE   Total Score  9 (mild anxiety) 12 (moderate anxiety)   Total Score 10 9  12        Patient-reported         2/10/2025     9:32 AM   Last PHQ-9   1.  Little interest or pleasure in doing things 1   2.  Feeling down, depressed, or hopeless 1   3.  Trouble falling or staying asleep, or sleeping too much 1   4.  Feeling tired or having little energy 2   5.  Poor appetite or overeating 2   6.  Feeling bad about yourself 1   7.  Trouble concentrating 1   8.  Moving slowly or restless 0   Q9: Thoughts of better off dead/self-harm past 2 weeks 0   PHQ-9 Total Score 9        Patient-reported         2/10/2025     9:33 AM   JENNY-7    1. Feeling nervous, anxious, or on edge 2   2. Not being able to stop or control worrying 2   3. Worrying too much about different things 2   4. Trouble relaxing 1   5. Being so restless that it is hard to sit still 1   6. Becoming easily annoyed or irritable 2   7. Feeling afraid, as if something awful might happen 2   JENNY-7 Total Score 12    If you checked any problems, how difficult have they made it for you to do your work, take care of things at home, or get along with other people? Somewhat difficult       Patient-reported       Suicide Assessment Five-step Evaluation and Treatment (SAFE-T)                Objective    /75 (BP Location: Right arm, Patient Position: Sitting, Cuff Size: Adult Large)   Pulse 72   Temp (!) 96.7  F (35.9  C) (Tympanic)   Resp 20   Ht 1.727 m (5' 8\")   Wt 116.1 kg (256 lb)   SpO2 96%   BMI 38.92 kg/m    Body mass index is 38.92 kg/m .  Physical Exam               Signed Electronically by: Alysha Durham MD    "

## 2025-02-13 ENCOUNTER — MYC MEDICAL ADVICE (OUTPATIENT)
Dept: FAMILY MEDICINE | Facility: CLINIC | Age: 30
End: 2025-02-13
Payer: MEDICAID

## 2025-02-27 ENCOUNTER — LAB (OUTPATIENT)
Dept: LAB | Facility: CLINIC | Age: 30
End: 2025-02-27
Payer: MEDICAID

## 2025-02-27 DIAGNOSIS — Z02.89 HEALTH EXAMINATION OF DEFINED SUBPOPULATION: ICD-10-CM

## 2025-02-27 DIAGNOSIS — F41.1 GENERALIZED ANXIETY DISORDER: ICD-10-CM

## 2025-02-27 DIAGNOSIS — R39.9 SYMPTOMS INVOLVING URINARY SYSTEM: ICD-10-CM

## 2025-02-27 LAB
ALBUMIN UR-MCNC: 30 MG/DL
AMPHETAMINES UR QL: NOT DETECTED
APPEARANCE UR: CLEAR
BARBITURATES UR QL SCN: NOT DETECTED
BENZODIAZ UR QL SCN: DETECTED
BILIRUB UR QL STRIP: ABNORMAL
BUPRENORPHINE UR QL: NOT DETECTED
CANNABINOIDS UR QL: NOT DETECTED
COCAINE UR QL SCN: NOT DETECTED
COLOR UR AUTO: YELLOW
D-METHAMPHET UR QL: NOT DETECTED
GLUCOSE UR STRIP-MCNC: NEGATIVE MG/DL
HGB UR QL STRIP: NEGATIVE
KETONES UR STRIP-MCNC: ABNORMAL MG/DL
LEUKOCYTE ESTERASE UR QL STRIP: ABNORMAL
METHADONE UR QL SCN: NOT DETECTED
NITRATE UR QL: NEGATIVE
OPIATES UR QL SCN: NOT DETECTED
OXYCODONE UR QL SCN: NOT DETECTED
PCP UR QL SCN: NOT DETECTED
PH UR STRIP: 6 [PH] (ref 5–7)
SP GR UR STRIP: >=1.03 (ref 1–1.03)
TRICYCLICS UR QL SCN: DETECTED
UROBILINOGEN UR STRIP-ACNC: 2 E.U./DL

## 2025-03-06 ENCOUNTER — MYC MEDICAL ADVICE (OUTPATIENT)
Dept: FAMILY MEDICINE | Facility: CLINIC | Age: 30
End: 2025-03-06
Payer: MEDICAID

## 2025-03-12 ENCOUNTER — TRANSFERRED RECORDS (OUTPATIENT)
Dept: HEALTH INFORMATION MANAGEMENT | Facility: CLINIC | Age: 30
End: 2025-03-12
Payer: MEDICAID

## 2025-03-12 ENCOUNTER — MEDICAL CORRESPONDENCE (OUTPATIENT)
Dept: HEALTH INFORMATION MANAGEMENT | Facility: CLINIC | Age: 30
End: 2025-03-12
Payer: MEDICAID

## 2025-03-19 ENCOUNTER — TRANSFERRED RECORDS (OUTPATIENT)
Dept: HEALTH INFORMATION MANAGEMENT | Facility: CLINIC | Age: 30
End: 2025-03-19
Payer: MEDICAID

## 2025-03-19 ENCOUNTER — MEDICAL CORRESPONDENCE (OUTPATIENT)
Dept: HEALTH INFORMATION MANAGEMENT | Facility: CLINIC | Age: 30
End: 2025-03-19
Payer: MEDICAID

## 2025-03-20 ENCOUNTER — TRANSCRIBE ORDERS (OUTPATIENT)
Dept: OTHER | Age: 30
End: 2025-03-20

## 2025-03-20 DIAGNOSIS — L70.0 ACNE VULGARIS: Primary | ICD-10-CM

## 2025-04-03 ENCOUNTER — MYC MEDICAL ADVICE (OUTPATIENT)
Dept: FAMILY MEDICINE | Facility: CLINIC | Age: 30
End: 2025-04-03
Payer: MEDICAID

## 2025-04-03 DIAGNOSIS — E34.9 HORMONE DEFICIENCY: ICD-10-CM

## 2025-04-03 RX ORDER — TESTOSTERONE CYPIONATE 200 MG/ML
200 INJECTION, SOLUTION INTRAMUSCULAR
Qty: 4 ML | Refills: 0 | Status: SHIPPED | OUTPATIENT
Start: 2025-04-03

## 2025-04-03 NOTE — TELEPHONE ENCOUNTER
Patient has no refills, per OV 02/2025 not due for appointment and labs until May 2025. Patient made aware, medication pended for refill.

## 2025-05-18 ENCOUNTER — HEALTH MAINTENANCE LETTER (OUTPATIENT)
Age: 30
End: 2025-05-18

## 2025-05-27 ENCOUNTER — MYC MEDICAL ADVICE (OUTPATIENT)
Dept: FAMILY MEDICINE | Facility: CLINIC | Age: 30
End: 2025-05-27
Payer: MEDICAID

## 2025-05-27 DIAGNOSIS — Z98.84 STATUS POST BARIATRIC SURGERY: Primary | ICD-10-CM

## 2025-05-27 NOTE — TELEPHONE ENCOUNTER
TC- please verify with patient (when) they are changing providers and (when) insurance coverage changes.    Please see patient my chart request for Labs.  If provider/insurance changes, patient will need to follow up with new PCP.

## 2025-05-31 DIAGNOSIS — E34.9 HORMONE DEFICIENCY: ICD-10-CM

## 2025-06-02 ENCOUNTER — TRANSFERRED RECORDS (OUTPATIENT)
Dept: HEALTH INFORMATION MANAGEMENT | Facility: CLINIC | Age: 30
End: 2025-06-02
Payer: MEDICAID

## 2025-06-02 RX ORDER — TESTOSTERONE CYPIONATE 200 MG/ML
INJECTION, SOLUTION INTRAMUSCULAR
Qty: 1 ML | Refills: 0 | Status: SHIPPED | OUTPATIENT
Start: 2025-06-02

## 2025-06-02 NOTE — CONFIDENTIAL NOTE
Please let patient know that I have sent in 1 dose of testosterone for him.  He has standing orders for labs and should get these drawn intermediate between 2 shots.  thanks

## 2025-06-08 ENCOUNTER — HEALTH MAINTENANCE LETTER (OUTPATIENT)
Age: 30
End: 2025-06-08

## 2025-06-12 ENCOUNTER — LAB (OUTPATIENT)
Dept: LAB | Facility: CLINIC | Age: 30
End: 2025-06-12
Payer: MEDICAID

## 2025-06-12 DIAGNOSIS — Z98.84 STATUS POST BARIATRIC SURGERY: ICD-10-CM

## 2025-06-12 DIAGNOSIS — Z51.81 ENCOUNTER FOR THERAPEUTIC DRUG MONITORING: ICD-10-CM

## 2025-06-12 DIAGNOSIS — F64.0 GENDER DYSPHORIA IN ADULT: ICD-10-CM

## 2025-06-12 DIAGNOSIS — E11.9 DIABETES MELLITUS, TYPE 2 (H): ICD-10-CM

## 2025-06-12 DIAGNOSIS — I10 HYPERTENSION: ICD-10-CM

## 2025-06-12 DIAGNOSIS — Z11.59 NEED FOR HEPATITIS C SCREENING TEST: ICD-10-CM

## 2025-06-12 DIAGNOSIS — Z11.4 SCREENING FOR HIV (HUMAN IMMUNODEFICIENCY VIRUS): ICD-10-CM

## 2025-06-12 LAB
ANION GAP SERPL CALCULATED.3IONS-SCNC: 10 MMOL/L (ref 7–15)
BASOPHILS # BLD AUTO: 0 10E3/UL (ref 0–0.2)
BASOPHILS NFR BLD AUTO: 1 %
BUN SERPL-MCNC: 10.7 MG/DL (ref 6–20)
CALCIUM SERPL-MCNC: 9.6 MG/DL (ref 8.8–10.4)
CHLORIDE SERPL-SCNC: 106 MMOL/L (ref 98–107)
CHOLEST SERPL-MCNC: 108 MG/DL
CREAT SERPL-MCNC: 1.04 MG/DL (ref 0.51–1.17)
EGFRCR SERPLBLD CKD-EPI 2021: 73 ML/MIN/1.73M2
EOSINOPHIL # BLD AUTO: 0.2 10E3/UL (ref 0–0.7)
EOSINOPHIL NFR BLD AUTO: 2 %
ERYTHROCYTE [DISTWIDTH] IN BLOOD BY AUTOMATED COUNT: 12 % (ref 10–15)
EST. AVERAGE GLUCOSE BLD GHB EST-MCNC: 128 MG/DL
ESTRADIOL SERPL-MCNC: 35 PG/ML
FASTING STATUS PATIENT QL REPORTED: YES
FASTING STATUS PATIENT QL REPORTED: YES
GLUCOSE SERPL-MCNC: 104 MG/DL (ref 70–99)
HBA1C MFR BLD: 6.1 % (ref 0–5.6)
HCO3 SERPL-SCNC: 27 MMOL/L (ref 22–29)
HCT VFR BLD AUTO: 48.8 % (ref 35–53)
HCV AB SERPL QL IA: NONREACTIVE
HDLC SERPL-MCNC: 37 MG/DL
HGB BLD-MCNC: 16.6 G/DL (ref 11.7–17.7)
HIV 1+2 AB+HIV1 P24 AG SERPL QL IA: NONREACTIVE
IMM GRANULOCYTES # BLD: 0 10E3/UL
IMM GRANULOCYTES NFR BLD: 0 %
LDLC SERPL CALC-MCNC: 29 MG/DL
LYMPHOCYTES # BLD AUTO: 3.5 10E3/UL (ref 0.8–5.3)
LYMPHOCYTES NFR BLD AUTO: 42 %
MCH RBC QN AUTO: 31.6 PG (ref 26.5–33)
MCHC RBC AUTO-ENTMCNC: 34 G/DL (ref 31.5–36.5)
MCV RBC AUTO: 93 FL (ref 78–100)
MONOCYTES # BLD AUTO: 0.7 10E3/UL (ref 0–1.3)
MONOCYTES NFR BLD AUTO: 8 %
NEUTROPHILS # BLD AUTO: 3.9 10E3/UL (ref 1.6–8.3)
NEUTROPHILS NFR BLD AUTO: 47 %
NONHDLC SERPL-MCNC: 71 MG/DL
PLATELET # BLD AUTO: 237 10E3/UL (ref 150–450)
POTASSIUM SERPL-SCNC: 3.7 MMOL/L (ref 3.4–5.3)
RBC # BLD AUTO: 5.26 10E6/UL (ref 3.8–5.9)
SHBG SERPL-SCNC: 26 NMOL/L (ref 11–135)
SODIUM SERPL-SCNC: 143 MMOL/L (ref 135–145)
TRIGL SERPL-MCNC: 212 MG/DL
VIT D+METAB SERPL-MCNC: 39 NG/ML (ref 20–50)
WBC # BLD AUTO: 8.3 10E3/UL (ref 4–11)

## 2025-06-12 PROCEDURE — 84270 ASSAY OF SEX HORMONE GLOBUL: CPT | Performed by: FAMILY MEDICINE

## 2025-06-12 PROCEDURE — 87389 HIV-1 AG W/HIV-1&-2 AB AG IA: CPT | Performed by: FAMILY MEDICINE

## 2025-06-12 PROCEDURE — 82306 VITAMIN D 25 HYDROXY: CPT | Performed by: FAMILY MEDICINE

## 2025-06-12 PROCEDURE — 80061 LIPID PANEL: CPT | Performed by: FAMILY MEDICINE

## 2025-06-12 PROCEDURE — 84403 ASSAY OF TOTAL TESTOSTERONE: CPT | Performed by: FAMILY MEDICINE

## 2025-06-12 PROCEDURE — 82670 ASSAY OF TOTAL ESTRADIOL: CPT | Performed by: FAMILY MEDICINE

## 2025-06-12 PROCEDURE — 86803 HEPATITIS C AB TEST: CPT | Performed by: FAMILY MEDICINE

## 2025-06-12 PROCEDURE — 80048 BASIC METABOLIC PNL TOTAL CA: CPT | Performed by: FAMILY MEDICINE

## 2025-06-14 LAB
TESTOST FREE SERPL-MCNC: 12.74 NG/DL
TESTOST SERPL-MCNC: 528 NG/DL (ref 8–950)

## 2025-06-19 ENCOUNTER — TELEPHONE (OUTPATIENT)
Dept: FAMILY MEDICINE | Facility: CLINIC | Age: 30
End: 2025-06-19

## 2025-06-19 ENCOUNTER — OFFICE VISIT (OUTPATIENT)
Dept: FAMILY MEDICINE | Facility: CLINIC | Age: 30
End: 2025-06-19
Attending: NURSE PRACTITIONER
Payer: MEDICAID

## 2025-06-19 VITALS
SYSTOLIC BLOOD PRESSURE: 110 MMHG | BODY MASS INDEX: 38.04 KG/M2 | TEMPERATURE: 97.4 F | HEART RATE: 84 BPM | RESPIRATION RATE: 16 BRPM | DIASTOLIC BLOOD PRESSURE: 78 MMHG | HEIGHT: 68 IN | WEIGHT: 251 LBS | OXYGEN SATURATION: 98 %

## 2025-06-19 DIAGNOSIS — H61.21 IMPACTED CERUMEN OF RIGHT EAR: ICD-10-CM

## 2025-06-19 DIAGNOSIS — F31.9 BIPOLAR I DISORDER (H): ICD-10-CM

## 2025-06-19 DIAGNOSIS — E11.9 TYPE 2 DIABETES MELLITUS WITHOUT COMPLICATION, WITHOUT LONG-TERM CURRENT USE OF INSULIN (H): ICD-10-CM

## 2025-06-19 DIAGNOSIS — Z98.84 STATUS POST BARIATRIC SURGERY: ICD-10-CM

## 2025-06-19 DIAGNOSIS — E34.9 HORMONE DEFICIENCY: ICD-10-CM

## 2025-06-19 DIAGNOSIS — Z00.00 ROUTINE GENERAL MEDICAL EXAMINATION AT A HEALTH CARE FACILITY: Primary | ICD-10-CM

## 2025-06-19 DIAGNOSIS — F34.1 DYSTHYMIC DISORDER: ICD-10-CM

## 2025-06-19 DIAGNOSIS — K58.1 IRRITABLE BOWEL SYNDROME WITH CONSTIPATION: ICD-10-CM

## 2025-06-19 RX ORDER — TIRZEPATIDE 12.5 MG/.5ML
12.5 INJECTION, SOLUTION SUBCUTANEOUS WEEKLY
Qty: 2 ML | Refills: 0 | Status: SHIPPED | OUTPATIENT
Start: 2025-06-19

## 2025-06-19 RX ORDER — TIRZEPATIDE 5 MG/.5ML
5 INJECTION, SOLUTION SUBCUTANEOUS WEEKLY
Qty: 2 ML | Refills: 0 | Status: SHIPPED | OUTPATIENT
Start: 2025-07-17

## 2025-06-19 RX ORDER — POLYETHYLENE GLYCOL 3350 17 G/17G
1 POWDER, FOR SOLUTION ORAL DAILY
Qty: 578 G | Refills: 11 | Status: SHIPPED | OUTPATIENT
Start: 2025-06-19

## 2025-06-19 RX ORDER — TESTOSTERONE CYPIONATE 200 MG/ML
225 INJECTION, SOLUTION INTRAMUSCULAR
Qty: 12 ML | Refills: 0 | Status: SHIPPED | OUTPATIENT
Start: 2025-06-19

## 2025-06-19 RX ORDER — TIRZEPATIDE 10 MG/.5ML
10 INJECTION, SOLUTION SUBCUTANEOUS WEEKLY
Qty: 2 ML | Refills: 0 | Status: SHIPPED | OUTPATIENT
Start: 2025-06-19

## 2025-06-19 RX ORDER — TIRZEPATIDE 7.5 MG/.5ML
7.5 INJECTION, SOLUTION SUBCUTANEOUS WEEKLY
Qty: 2 ML | Refills: 0 | Status: SHIPPED | OUTPATIENT
Start: 2025-06-19

## 2025-06-19 RX ORDER — VILAZODONE HYDROCHLORIDE 40 MG/1
40 TABLET ORAL DAILY
COMMUNITY

## 2025-06-19 RX ORDER — TIRZEPATIDE 2.5 MG/.5ML
2.5 INJECTION, SOLUTION SUBCUTANEOUS WEEKLY
Qty: 2 ML | Refills: 0 | Status: SHIPPED | OUTPATIENT
Start: 2025-06-19

## 2025-06-19 RX ORDER — TIRZEPATIDE 15 MG/.5ML
15 INJECTION, SOLUTION SUBCUTANEOUS WEEKLY
Qty: 6 ML | Refills: 1 | Status: SHIPPED | OUTPATIENT
Start: 2025-06-19

## 2025-06-19 SDOH — HEALTH STABILITY: PHYSICAL HEALTH: ON AVERAGE, HOW MANY DAYS PER WEEK DO YOU ENGAGE IN MODERATE TO STRENUOUS EXERCISE (LIKE A BRISK WALK)?: 1 DAY

## 2025-06-19 ASSESSMENT — PATIENT HEALTH QUESTIONNAIRE - PHQ9
10. IF YOU CHECKED OFF ANY PROBLEMS, HOW DIFFICULT HAVE THESE PROBLEMS MADE IT FOR YOU TO DO YOUR WORK, TAKE CARE OF THINGS AT HOME, OR GET ALONG WITH OTHER PEOPLE: VERY DIFFICULT
SUM OF ALL RESPONSES TO PHQ QUESTIONS 1-9: 14
SUM OF ALL RESPONSES TO PHQ QUESTIONS 1-9: 14

## 2025-06-19 ASSESSMENT — SOCIAL DETERMINANTS OF HEALTH (SDOH): HOW OFTEN DO YOU GET TOGETHER WITH FRIENDS OR RELATIVES?: NEVER

## 2025-06-19 NOTE — TELEPHONE ENCOUNTER
Patient called to report that [facility] did not receive the 2.5 mg, but did receive the 10 mg    06/19/2025   tirzepatide (MOUNJARO) 2.5 MG/0.5ML SOAJ auto-injector pen   2.5 mg, WEEKLY  Receipt confirmed by pharmacy (6/19/2025 10:02 AM CDT)       RN called and spoke with pharmacy. Pharmacy has received all RX.  Pharmacy was filling the 10mg dose.   Pharmacy will correct, and marshall the 2.5 mg dose.  
negative...

## 2025-06-19 NOTE — PATIENT INSTRUCTIONS
Family members can show kindness to their transgender loved ones by consistently using their chosen name and pronouns, educating themselves on gender identity and expression, and offering support for their journey. Being a good ally also involves advocating for their needs and creating a safe, accepting space for them.   Here's a more detailed breakdown:  1. Affirming the Person's Identity:  Use their chosen name and pronouns: This is one of the most fundamental ways to show respect and acceptance.  Believe them: Recognize that gender identity is personal and valid, and avoid questioning or debating their self-identified gender.  Follow their lead: They likely know what feels best for them in terms of coming out, using names and pronouns, and seeking support.   2. Educating Yourself and Others:  Learn about gender identity and expression: This includes understanding terms like transgender, non-binary, gender dysphoria, and the various ways people express their gender.   Seek out resources: Organizations like [employer], [employer], and Gender Spectrum offer valuable information and support for families.   Educate other family members: Encourage them to learn and be respectful as well.   3. Providing Support and Advocacy:  Offer emotional support:  .  Let them know you are there to listen and offer comfort when they need it.   Help them access resources:  .  Connect them with LGBTQ+ centers, support groups, and healthcare providers specializing in gender-affirming care.   Advocate for their needs:  .  If they face discrimination or challenges, stand up for them and help them navigate those situations.   Celebrate their milestones:  .  Acknowledge and celebrate their journey, whether it's coming out, starting hormones, or other transitions.   4. Being Patient and Understanding:  Allow for mistakes:  It's okay to make mistakes with pronouns or names, but it's important to apologize, correct yourself, and move on.    Be patient with the process:  Transitioning is a personal journey, and it may take time for them, and for you, to adjust.   Acknowledge your own feelings:  It's normal to have complex emotions about your loved one's transition. Seek support for yourself if needed.   5. Creating a Safe Space:  Respect their privacy: Don't share information about their transition without their consent.   Be mindful of your language and actions: Avoid making assumptions or using stereotypes about transgender people.   Be an ally: Stand up for them and other transgender people when you see or hear discriminatory behavior.   By following these tips, family members can create a supportive and loving environment for their transgender loved ones, fostering their well-being and happiness.       How can I support someone who's trans or nonbinary?  In This Section  Transgender and Nonbinary Identities  How do I know if I m transgender?  What do I need to know about transitioning?  What do I need to know about trans and nonbinary health care?  What do I need to know about sexual health as a trans or nonbinary person?  Coming out as trans and/or nonbinary.  What's transphobia, also called transmisia?  How can I support someone who's trans or nonbinary?  Transgender Identity Terms and Labels  To end transmisic harassment and discrimination, cisgender people must work to support transgender and nonbinary people -- and the systemic issues affecting them.    Support is important.  Every community has trans and nonbinary people. However, transgender and nonbinary communities continue to face discrimination, erasure, disapproval, violence, rejection, and even murder because of transmisia. It can also result in depression, substance use problems, self-harm, and suicide.     Transmisia affects trans and nonbinary people in all areas of life. Cisgender individuals and communities must all fight alongside their trans and nonbinary friends.     We have  to create safer, more inclusive communities that are welcoming to trans, nonbinary, and gender nonconforming people. We can t stop until everyone is treated with the respect and dignity they deserve -- and can live free of violence and discrimination.      What do I call people who are transgender or nonbinary?  Ask if they use pronouns and what their pronouns are -- just like you d ask them their name. Remember to use the name and pronouns that the person shares with you every time.    Check out these common terms.    Generally -- and especially if you don t know the right words to use for someone yet -- use inclusive language. Inclusive language is saying  person  instead of  man  or  woman,  or  they  instead of  he  or  she.  Never call someone  it  unless they ve asked you to.     If someone isn t sure which identity label fits them best, give them time to figure it out for themselves. The language a person uses may change over time, and that s totally normal.    How do I support the transgender and nonbinary people in my life?   Whatever your relationship -- parent, friend, partner, teacher -- your support is important.    The people closest to us are usually the best at helping us feel safe and connected. So, it can be dangerous for trans and nonbinary people when their loved ones are unsupportive. Many who experience violence are hurt by family members or partners, and many trans and nonbinary young people experience being unhoused (aka homeless) because their families kicked them out.    Trans and nonbinary people are more likely to feel healthy, happy, and safe if they have support from family members and others who are close to them.    Here are some tips for supporting a trans or nonbinary person in your life:    Listen to them and believe them about their identities and needs.   Use the person s chosen name and pronouns.  Support them in accessing trans and nonbinary community resources, if they  want.  Remember that being trans or nonbinary is normal and natural -- at any age and in every community.   Educate yourself about sex, gender identity, and gender modality.   Seek out resources for loved ones of trans and nonbinary people.   Find support for yourself outside of your relationship with them -- especially if you re a parent. You deserve space to process your feelings about your loved one s transgender or nonbinary identity. Local and national organizations like [facility] have programs for loved ones of LGBTQ+ people.    If you re a parent, check out our tips for talking to your child about gender identity.      What if I m a partner of a trans or nonbinary person?   Partners of trans and nonbinary people may have particular challenges or questions about how to be supportive -- especially if your partner is just coming out or starting to transition. Partners of openly trans and nonbinary people -- or who appear to be in a non-heterosexual relationship -- may also face discrimination, harassment, and violence.     Your partner s gender identity doesn t define your relationship or your sexual orientation. However, your partner s identity or transition may encourage you to reflect on your sexual or romantic orientation differently than you have before -- especially if they have a gender that isn t what you re used to being attracted to. For example, a heterosexual woman whose partner comes out as a trans woman might come to feel pansexual, bisexual, or another sexual or romantic orientation.    [place] or being attracted to trans and nonbinary people is totally natural, just like being trans or nonbinary is. But transmisic ideas and stereotypes have made some people  the idea of being attracted to trans or nonbinary people. It s never OK to respond to a trans or nonbinary person s identity or body with hostility, rudeness, or violence.     Here are some tips for being supportive to your trans or nonbinary  partner:   Ask your partner what they need and want day-to-day. For example, avoid making assumptions about the words they want you to use, or what kind of support they d like if someone misgenders them.   Ask your partner what they need and want in your sex life, including what kinds of touch they do or don t want.  Get support. Some LGBTQ+ organizations have groups or services for the partners of trans and nonbinary people. There are also online groups and discussion spaces. Supportive friends, family, or a trans-knowledgeable therapist can also be helpful.   Remember that your partner s transgender or nonbinary identity is an important part of who they are, but not their only identity, challenge, or beauty that they have.   Ask your partner about their desires, goals, and resources related to transition. Avoid assumptions about the ways your partner will or won t transition. And remember that these choices are always up to your partner.   Read articles and books or watch videos and performances made by trans and nonbinary people.   Use resources made specifically for partners, like the  Let s Get Real  guide from the [employer], which has information about dating and sex with trans and nonbinary partners.  It s normal to have questions, concerns, and feelings as a partner of a trans or nonbinary person. It s also normal to feel curious, happy for your partner, or excited to learn more about this person you care about.      Where can I learn more or get support?  Learn about gender identity and gender modality.   Learn about some trans and nonbinary terms and identity labels.  Gender Spectrum, which works to create gender-sensitive and inclusive environments for children and teens  [employer] s helpline for friends and family of trans and nonbinary people  Your local PFLAG chapter or other organization serving loved ones of LGBTQ+ people  [facility] [employer] s  Tips for Parents and Guardians of Trans  "Youth   The Family Acceptance Project's LGBTQ Youth & Family Resources   The National Center for Transgender [place]  The Juan Project's publication, \"A Guide to Being an Ally to Transgender and Nonbinary Youth\"  DELFIN JEONG's Straight for [place] site   The ACLU      In [place], [place], the safest way to dispose of , unwanted, or unused testosterone, which is a prescription medication, is through the [place] [employer]'s medication disposal program.   Here's how the program works:  Bring the medication to the [employer] [employer].  Follow specific instructions for disposing of pills (dumping into a baggie provided, discarding the empty bottle after removing personal information) and liquid medication (pouring onto paper towels before bagging).  Place the sealed baggie of medication into the disposal receptacle.  This is a confidential and free service for personal use.   Important Notes:  Do not put empty bottles in the receptacle.  Remove personal information from bottles before discarding them.   This program helps ensure controlled substances like testosterone are disposed of safely and securely. While other local facilities handle various waste, prescription drugs should be taken to the police department.   For injectable testosterone, the [facility] offers free [employer] containers and disposal.   Patient Education   Preventive Care Advice   This is general advice given by our system to help you stay healthy. However, your care team may have specific advice just for you. Please talk to your care team about your preventive care needs.  Nutrition  Eat 5 or more servings of fruits and vegetables each day.  Try wheat bread, brown rice and whole grain pasta (instead of white bread, rice, and pasta).  Get enough calcium and vitamin D. Check the label on foods and aim for 100% of the RDA (recommended daily allowance).  Lifestyle  Exercise at least 150 minutes each " week  (30 minutes a day, 5 days a week).  Do muscle strengthening activities 2 days a week. These help control your weight and prevent disease.  No smoking.  Wear sunscreen to prevent skin cancer.  Have a dental exam and cleaning every 6 months.  Yearly exams  See your health care team every year to talk about:  Any changes in your health.  Any medicines your care team has prescribed.  Preventive care, family planning, and ways to prevent chronic diseases.  Shots (vaccines)   HPV shots (up to age 26), if you've never had them before.  Hepatitis B shots (up to age 59), if you've never had them before.  COVID-19 shot: Get this shot when it's due.  Flu shot: Get a flu shot every year.  Tetanus shot: Get a tetanus shot every 10 years.  Pneumococcal, hepatitis A, and RSV shots: Ask your care team if you need these based on your risk.  Shingles shot (for age 50 and up)  General health tests  Diabetes screening:  Starting at age 35, Get screened for diabetes at least every 3 years.  If you are younger than age 35, ask your care team if you should be screened for diabetes.  Cholesterol test: At age 39, start having a cholesterol test every 5 years, or more often if advised.  Bone density scan (DEXA): At age 50, ask your care team if you should have this scan for osteoporosis (brittle bones).  Hepatitis C: Get tested at least once in your life.  STIs (sexually transmitted infections)  Before age 24: Ask your care team if you should be screened for STIs.  After age 24: Get screened for STIs if you're at risk. You are at risk for STIs (including HIV) if:  You are sexually active with more than one person.  You don't use condoms every time.  You or a partner was diagnosed with a sexually transmitted infection.  If you are at risk for HIV, ask about PrEP medicine to prevent HIV.  Get tested for HIV at least once in your life, whether you are at risk for HIV or not.  Cancer screening tests  Cervical cancer screening: If you have  a cervix, begin getting regular cervical cancer screening tests starting at age 21.  Breast cancer scan (mammogram): If you've ever had breasts, begin having regular mammograms starting at age 40. This is a scan to check for breast cancer.  Colon cancer screening: It is important to start screening for colon cancer at age 45.  Have a colonoscopy test every 10 years (or more often if you're at risk) Or, ask your provider about stool tests like a FIT test every year or Cologuard test every 3 years.  To learn more about your testing options, visit:   .  For help making a decision, visit:   https://bit.ly/ti78701.  Prostate cancer screening test: If you have a prostate, ask your care team if a prostate cancer screening test (PSA) at age 55 is right for you.  Lung cancer screening: If you are a current or former smoker ages 50 to 80, ask your care team if ongoing lung cancer screenings are right for you.  For informational purposes only. Not to replace the advice of your health care provider. Copyright   2023 [facility]. All rights reserved. Clinically reviewed by the [facility] Transitions Program. [employer] [id number] - REV 01/24.  Relationships for Good Health  Relationships are important for our health and happiness. Social isolation, loneliness and lack of support are bad for your health. Studies show that loneliness can harm health and limit your life span as much as high blood pressure and smoking.   Take some time to reflect on your relationships. Then answer these questions:  Are there people in your life that cause you stress or drain your energy? What can you do to set  limits?  ________________________________________________________________________________________________________________________________________________________________________________________________________________________________________________________________________________________________________________________________________________  Who do you enjoy spending time with? Who can you go to for support?  ________________________________________________________________________________________________________________________________________________________________________________________________________________________________________________________________________________________________________________________________________________  What can you do to improve your relationships with others?  __________________________________________________________________________________________________________________________________________________________________________________________________________________  ______________________________________________________________________________________________________________________________  What do you like most about your relationships with others?  ________________________________________________________________________________________________________________________________________________________________________________________________________________________________________________________________________________________________________________________________________________  My goal: ______________________________________________________________________  I will: ______________________________________________________________________________________________________________________________________________________________________________________________    For informational purposes only. Not to replace the advice of your health care provider. Copyright   2018  [facility]. All rights reserved. Clinically reviewed by Bariatric Health  Team. [employer] 029675 - Rev 06/24.  Learning About Stress  What is stress?     Stress is your body's response to a hard situation. Your body can have a physical, emotional, or mental response. Stress is a fact of life for most people, and it affects everyone differently. What causes stress for you may not be stressful for someone else.  A lot of things can cause stress. You may feel stress when you go on a job interview, take a test, or run a race. This kind of short-term stress is normal and even useful. It can help you if you need to work hard or react quickly. For example, stress can help you finish an important job on time.  Long-term stress is caused by ongoing stressful situations or events. Examples of long-term stress include long-term health problems, ongoing problems at work, or conflicts in your family. Long-term stress can harm your health.  How does stress affect your health?  When you are stressed, your body responds as though you are in danger. It makes hormones that speed up your heart, make you breathe faster, and give you a burst of energy. This is called the fight-or-flight stress response. If the stress is over quickly, your body goes back to normal and no harm is done.  But if stress happens too often or lasts too long, it can have bad effects. Long-term stress can make you more likely to get sick, and it can make symptoms of some diseases worse. If you tense up when you are stressed, you may develop neck, shoulder, or low back pain. Stress is linked to high blood pressure and heart disease.  Stress also harms your emotional health. It can make you ruffin, tense, or depressed. Your relationships may suffer, and you may not do well at work or school.  What can you do to manage stress?  You can try these things to help manage stress:   Do something active. Exercise or activity can help reduce stress.  Walking is a great way to get started. Even everyday activities such as housecleaning or yard work can help.  Try yoga or gayathri chi. These techniques combine exercise and meditation. You may need some training at first to learn them.  Do something you enjoy. For example, listen to music or go to a movie. Practice your hobby or do volunteer work.  Meditate. This can help you relax, because you are not worrying about what happened before or what may happen in the future.  Do guided imagery. Imagine yourself in any setting that helps you feel calm. You can use online videos, books, or a teacher to guide you.  Do breathing exercises. For example:  From a standing position, bend forward from the waist with your knees slightly bent. Let your arms dangle close to the floor.  Breathe in slowly and deeply as you return to a standing position. Roll up slowly and lift your head last.  Hold your breath for just a few seconds in the standing position.  Breathe out slowly and bend forward from the waist.  Let your feelings out. Talk, laugh, cry, and express anger when you need to. Talking with supportive friends or family, a counselor, or a lisa leader about your feelings is a healthy way to relieve stress. Avoid discussing your feelings with people who make you feel worse.  Write. It may help to write about things that are bothering you. This helps you find out how much stress you feel and what is causing it. When you know this, you can find better ways to cope.  What can you do to prevent stress?  You might try some of these things to help prevent stress:  Manage your time. This helps you find time to do the things you want and need to do.  Get enough sleep. Your body recovers from the stresses of the day while you are sleeping.  Get support. Your family, friends, and community can make a difference in how you experience stress.  Limit your news feed. Avoid or limit time on social media or news that may make you feel  "stressed.  Do something active. Exercise or activity can help reduce stress. Walking is a great way to get started.  Where can you learn more?  Go to https://www.[employer].net/patiented  Enter N032 in the search box to learn more about \"Learning About Stress.\"  Current as of: October 24, 2024  Content Version: 14.5    9119-8304 [employer]   Care instructions adapted under license by your healthcare professional. If you have questions about a medical condition or this instruction, always ask your healthcare professional. [employer] disclaims any warranty or liability for your use of this information.    Recovering From Depression: Care Instructions  Overview    Sticking to your treatment plan is important as you recover from depression. It may take time for your symptoms to get better after you start treatment. Try not to give up if you don't feel better right away. Make sure you keep going to counseling and taking any prescribed medicine if they are part of your treatment plan.  Focus on things that can help you feel better, such as being with friends and family. Try to eat healthy foods, be active, and get enough sleep. Take things slowly as you begin to recover.  Follow-up care is a key part of your treatment and safety. Be sure to make and go to all appointments, and call your doctor if you are having problems. It's also a good idea to know your test results and keep a list of the medicines you take.  How can you care for yourself at home?  Be realistic  If you have a large task to do, break it up into smaller steps you can handle, and just do what you can.  You may want to put off important decisions until your depression has lifted. If you have plans that will have a major impact on your life, such as marriage, divorce, or a job change, try to wait a bit. Talk it over with friends and loved ones who can help you look at the overall picture first.  Reaching out to people for help is " important. Do not isolate yourself. Let your family and friends help you. Find someone you can trust and confide in, and talk to that person.  Be patient, and be kind to yourself. Remember that depression is not your fault and is not something you can overcome with willpower alone. Treatment is important for depression, just like for any other illness. Feeling better takes time, and your mood will improve little by little.  Stay active  Stay busy and get outside. Take a walk, or try some other light exercise.  Talk with your doctor about an exercise program. Exercise can help with mild depression.  Go to a movie or concert. Take part in a Mandaeism activity or other social gathering. Go to a ball game.  Ask a friend to have dinner with you.  Take care of yourself  Eat healthy foods such as fresh fruits and vegetables, whole grains, and lean protein. If you have lost your appetite, eat small snacks rather than large meals.  Avoid using marijuana and other drugs and drinking alcohol. Do not take medicines that have not been prescribed for you. They may interfere with medicines you may be taking for depression, or they may make your depression worse.  Take your medicines exactly as they are prescribed. You may start to feel better within 1 to 3 weeks of taking antidepressant medicine. But it can take as many as 6 to 8 weeks to see more improvement. If you have questions or concerns about your medicines, or if you do not notice any improvement by 3 weeks, talk to your doctor.  Continue to take your medicine after your symptoms improve. Taking your medicine for at least 6 months after you feel better can help keep you from getting depressed again. If this isn't the first time you have been depressed, your doctor may recommend you to take medicine even longer.  If you have any side effects from your medicine, tell your doctor. Many side effects are mild and will go away on their own after you have been taking the medicine  for a few weeks. Some may last longer. Talk to your doctor if side effects are bothering you too much. You might be able to try a different medicine.  Continue counseling. It may help prevent depression from returning, especially if you've had multiple episodes of depression. Talk with your counselor if you are having a hard time attending your sessions or you think the sessions aren't working. Don't just stop going.  Get enough sleep. Talk to your doctor if you are having problems sleeping.  Avoid sleeping pills unless they are prescribed by the doctor treating your depression. Sleeping pills may make you groggy during the day, and they may interact with other medicine you are taking.  If you have any other illnesses, such as diabetes, heart disease, or high blood pressure, make sure to continue with your treatment. Tell your doctor about all of the medicines you take, including those with or without a prescription.  Where to get help 24 hours a day, 7 days a week  If you or someone you know talks about suicide, self-harm, a mental health crisis, a substance use crisis, or any other kind of emotional distress, get help right away. You can:  Call the Suicide and Crisis Lifeline at 988.  Text HOME to 963782 to access the Crisis Text Line.  Consider saving these numbers in your phone.  Go to [employer] for more information or to chat online.  Call 911 anytime you think you may need emergency care. For example, call if:  You feel like hurting yourself or someone else.  Someone you know has depression and is about to attempt or is attempting suicide.  Where to get help 24 hours a day, 7 days a week  If you or someone you know talks about suicide, self-harm, a mental health crisis, a substance use crisis, or any other kind of emotional distress, get help right away. You can:  Call the Suicide and Crisis Lifeline at [employer].  Text HOME to 865469 to access the Crisis Text Line.  Consider saving these numbers in your  "phone.  Go to [employer] for more information or to chat online.  Call your doctor now or seek immediate medical care if:  You hear voices.  Someone you know has depression and:  Starts to give away possessions.  Uses illegal drugs or drinks alcohol heavily.  Talks or writes about death, including writing suicide notes or talking about guns, knives, or pills.  Starts to spend a lot of time alone.  Acts very aggressively or suddenly appears calm.  Watch closely for changes in your health, and be sure to contact your doctor if:  You do not get better as expected.  Where can you learn more?  Go to https://www.[employer].net/patiented  Enter N529 in the search box to learn more about \"Recovering From Depression: Care Instructions.\"  Current as of: July 31, 2024  Content Version: 14.5    1981-0001 [employer].   Care instructions adapted under license by your healthcare professional. If you have questions about a medical condition or this instruction, always ask your healthcare professional. [employer] disclaims any warranty or liability for your use of this information.       "

## 2025-06-19 NOTE — TELEPHONE ENCOUNTER
Prior Authorization Retail Medication Request    Medication/Dose: tirzepatide (MOUNJARO) 2.5 MG/0.5ML SOAJ auto-injector pen  Diagnosis and ICD code (if different than what is on RX):    New/renewal/insurance change PA/secondary ins. PA:  Previously Tried and Failed:    Rationale:      Insurance   Primary: Medicaid [place]  Insurance ID:  [id number]     Secondary (if applicable):  Insurance ID:      Pharmacy Information (if different than what is on RX)  Name:    Phone:    Fax:    Clinic Information  Preferred routing pool for dept communication:

## 2025-06-19 NOTE — PROGRESS NOTES
Preventive Care Visit  [facility]  Alysha Durham MD, Family Medicine  Jun 19, 2025      Assessment & Plan   Problem List Items Addressed This Visit       RESOLVED: Dysthymic disorder    Relevant Medications    vilazodone (VIIBRYD) 40 MG TABS tablet    Diabetes mellitus, type 2 (H)    Relevant Medications    tirzepatide (MOUNJARO) 2.5 MG/0.5ML SOAJ auto-injector pen    tirzepatide (MOUNJARO) 5 MG/0.5ML SOAJ auto-injector pen (Start on 7/17/2025)    Tirzepatide (MOUNJARO) 7.5 MG/0.5ML SOAJ auto-injector pen    tirzepatide (MOUNJARO) 10 MG/0.5ML SOAJ auto-injector pen    tirzepatide (MOUNJARO) 12.5 MG/0.5ML SOAJ auto-injector pen    tirzepatide (MOUNJARO) 15 MG/0.5ML SOAJ auto-injector pen    Status post bariatric surgery    Hormone deficiency    Relevant Medications    testosterone cypionate (DEPOTESTOSTERONE) 200 MG/ML injection    Bipolar I disorder (H)    Relevant Medications    vilazodone (VIIBRYD) 40 MG TABS tablet    Irritable bowel syndrome with constipation    Relevant Medications    testosterone cypionate (DEPOTESTOSTERONE) 200 MG/ML injection    tirzepatide (MOUNJARO) 2.5 MG/0.5ML SOAJ auto-injector pen    tirzepatide (MOUNJARO) 5 MG/0.5ML SOAJ auto-injector pen (Start on 7/17/2025)    Tirzepatide (MOUNJARO) 7.5 MG/0.5ML SOAJ auto-injector pen    tirzepatide (MOUNJARO) 10 MG/0.5ML SOAJ auto-injector pen    tirzepatide (MOUNJARO) 12.5 MG/0.5ML SOAJ auto-injector pen    tirzepatide (MOUNJARO) 15 MG/0.5ML SOAJ auto-injector pen    polyethylene glycol (MIRALAX) 17 GM/Dose powder     Other Visit Diagnoses         Routine general medical examination at a health care facility    -  Primary      Impacted cerumen of right ear               Assessment & Plan  Hormone deficiency:  - Current testosterone dose is 200 mg bi-monthly. Patient expresses desire to increase dosage.  - Increase testosterone dose to 225 mg bi-monthly. Switch syringes to accommodate new dosage. Dispose of unused  "testosterone at [facility] Department's medication disposal program.  Provided with some resources in the after visit summary for ways to ask his family to be more supportive of patient's gender dysphoria.    Type 2 diabetes mellitus without complication, without long-term current use of insulin:  - Diabetes is well controlled.  - Consider starting Mounjaro for weight management. Discuss titration schedule starting at 2.5 mg and increasing every 4 weeks.  - Risks and side effects: Discussed black box warning for thyroid cancer in rats, not proven in humans.    Irritable bowel syndrome with constipation:  - Current Linzess dose is 145 mg. Miralax prescription sent.  - Consider increasing Linzess dose to 290 mg if not already at maximum. Follow up on Miralax prescription coverage.    Impacted cerumen of right ear:  - Impacted cerumen noted in right ear.  - Right ear to be flushed.     History of bariatric surgery was noted and taken to account for medical decision making.  Patient still follows with her bariatric clinic.      Mental health patient recently started on Viibryd by their mental health prescriber.  Patient reports that it seems to be helping with depression.      Consent was obtained from the patient to use an AI documentation tool in the creation of  this note.  Voice recognition software was also used.  There may be associated transcribing errors.     The longitudinal plan of care for the diagnosis(es)/condition(s) as documented were addressed during this visit. Due to the added complexity in care, I will continue to support Brian in the subsequent management and with ongoing continuity of care.            BMI  Estimated body mass index is 38.73 kg/m  as calculated from the following:    Height as of this encounter: 1.715 m (5' 7.5\").    Weight as of this encounter: 113.9 kg (251 lb).   Weight management plan: TLC    Counseling  Appropriate preventive services were addressed with this patient via " screening, questionnaire, or discussion as appropriate for fall prevention, nutrition, physical activity, Tobacco-use cessation, social engagement, weight loss and cognition.  Checklist reviewing preventive services available has been given to the patient.  Reviewed patient's diet, addressing concerns and/or questions.   He is at risk for lack of exercise and has been provided with information to increase physical activity for the benefit of his well-being.   Patient is at risk for social isolation and has been provided with information about the benefit of social connection.   The patient was instructed to see the dentist every 6 months.   He is at risk for psychosocial distress and has been provided with information to reduce risk.           Subjective   Brian is a 29 year old, presenting for the following:  Physical        6/19/2025     9:17 AM   Additional Questions   Roomed by Louisa Cobb submitted by the patient for this visit:  Patient Health Questionnaire (Submitted on 6/19/2025)  If you checked off any problems, how difficult have these problems made it for you to do your work, take care of things at home, or get along with other people?: Very difficult  PHQ9 TOTAL SCORE: 14    Brian Shirley, age 29 years, pronouns he/him    - Experiencing invalidation from family regarding gender identity, causing emotional distress  - Living at home, family uses incorrect pronouns, leading to discomfort  - Seeking support for gender identity, including finding a binder  - Taking testosterone, considering increasing dosage from 200 mg to 225 mg  - Diagnosed with bipolar disorder, experiencing side effects from medications  - Suspected higher incidence of autism in transgender community, considering evaluation  - History of bipolar disorder, concerns about medication management and side effects        Advance Care Planning    Discussed advance care planning with patient; informed AVS has link to Honoring  Choices.        6/19/2025   General Health   How would you rate your overall physical health? (!) FAIR   Feel stress (tense, anxious, or unable to sleep) Rather much   (!) STRESS CONCERN      6/19/2025   Nutrition   Three or more servings of calcium each day? Yes   Diet: Diabetic   How many servings of fruit and vegetables per day? (!) 2-3   How many sweetened beverages each day? 0-1         6/19/2025   Exercise   Days per week of moderate/strenous exercise 1 day   (!) EXERCISE CONCERN      6/19/2025   Social Factors   Frequency of gathering with friends or relatives Never   Worry food won't last until get money to buy more No   Food not last or not have enough money for food? No   Do you have housing? (Housing is defined as stable permanent housing and does not include staying outside in a car, in a tent, in an abandoned building, in an overnight shelter, or couch-surfing.) Yes   Are you worried about losing your housing? No   Lack of transportation? No   Unable to get utilities (heat,electricity)? No   (!) SOCIAL CONNECTIONS CONCERN      6/19/2025   Dental   Dentist two times every year? (!) NO       Today's PHQ-9 Score:       6/19/2025     9:05 AM   PHQ-9 SCORE   PHQ-9 Total Score MyChart 14 (Moderate depression)   PHQ-9 Total Score 14        Patient-reported         6/19/2025   Substance Use   Alcohol more than 3/day or more than 7/wk No   Do you use any other substances recreationally? (!) DECLINE     Social History     Tobacco Use    Smoking status: Former     Types: Cigarettes     Passive exposure: Past    Smokeless tobacco: Never   Vaping Use    Vaping status: Never Used   Substance Use Topics    Alcohol use: Never    Drug use: Never                  6/19/2025   STI Screening   New sexual partner(s) since last STI/HIV test? (!) DECLINE     History of abnormal Pap smear: No - age 19 - 63 - Patient with other risk factor requiring more frequent screening - see link Cervical Cytology Screening Guidelines      "        6/19/2025   Contraception/Family Planning   Questions about contraception or family planning No   What are your periods like? Not currently having periods        Reviewed and updated as needed this visit by Provider     Meds                         Objective    Exam  /78 (BP Location: Right arm, Patient Position: Sitting)   Pulse 84   Temp 97.4  F (36.3  C) (Tympanic)   Resp 16   Ht 1.715 m (5' 7.5\")   Wt 113.9 kg (251 lb)   LMP  (LMP Unknown)   SpO2 98%   BMI 38.73 kg/m     Estimated body mass index is 38.73 kg/m  as calculated from the following:    Height as of this encounter: 1.715 m (5' 7.5\").    Weight as of this encounter: 113.9 kg (251 lb).    Physical Exam        Exam:  General: alert and oriented ×3 no acute distress.    HEENT: pupils are equal round and reactive to light extraocular motion is intact. Normocephalic and atraumatic.     Hearing is grossly normal and there is no otorrhea.     Chest: has bilateral rise with no increased work of breathing.    Cardiovascular: normal perfusion and brisk capillary refill.    Musculoskeletal: no gross focal abnormalities and normal gait.    Neuro: no gross focal abnormalities and memory seems intact.    Psychiatric: speech is clear and coherent and fluent. Patient dressed appropriately for the weather. Mood is appropriate and affect is full.            Signed Electronically by: Alysha Durham MD    "

## 2025-06-23 ENCOUNTER — MYC MEDICAL ADVICE (OUTPATIENT)
Dept: FAMILY MEDICINE | Facility: CLINIC | Age: 30
End: 2025-06-23
Payer: MEDICAID

## 2025-06-23 ENCOUNTER — TELEPHONE (OUTPATIENT)
Dept: FAMILY MEDICINE | Facility: CLINIC | Age: 30
End: 2025-06-23
Payer: MEDICAID

## 2025-06-23 NOTE — TELEPHONE ENCOUNTER
Incoming call from patient.  Reports testosterone dose was increased from 200 mg to 225 mg IM every 14 days and has questions about the dosing and disposal of medication.    RN reviewed prescription dose with patient and attempted to answer questions.  Uncertain if needing to dispose of remaining medication in vial after drawing up dose and no instructions from pharmacy on this.  Patient will call pharmacy for further direction and to ensure not waisting medication unnecessarily.       Neema Real RN  [facility]

## 2025-06-23 NOTE — TELEPHONE ENCOUNTER
Central Prior Authorization Team   Phone: [phone]    PA Initiation    Medication: tirzepatide (MOUNJARO) 2.5 MG/0.5ML SOAJ auto-injector pen-  Insurance Company: [place] Medicaid ([facility] [employer]) - Phone [phone] Fax [phone]  Pharmacy Filling the Rx: [id number] [place] [address]  AT [facility] OF [address]  Filling Pharmacy Phone: [phone]  Filling Pharmacy Fax:    Start Date: 6/23/2025

## 2025-06-23 NOTE — TELEPHONE ENCOUNTER
Prior Authorization Not Needed per Insurance    Medication: tirzepatide (MOUNJARO) 2.5 MG/0.5ML SOAJ auto-injector pen-PA NOT NEEDED   Insurance Company: [place] Medicaid [facility] [employer]) - Phone [phone] Fax [phone]  Expected CoPay:      Pharmacy Filling the Rx: [id number] [place] [address] [address] [facility] AT [facility] & [facility]  Pharmacy Notified:  Yes  Patient Notified:  No    Called pharmacy and pharmacy stated that PA is Not Needed and medication is covered. Pharmacy stated that they stated that an override is needed in order to process medication.

## 2025-06-30 NOTE — TELEPHONE ENCOUNTER
Prior Authorization Not Needed per Insurance    Medication: tirzepatide (MOUNJARO) 2.5 MG/0.5ML SOAJ auto-injector pen-PA NOT NEEDED   Insurance Company: [place] Medicaid [facility] [facility] [employer]) - Phone [phone] Fax [phone]  Expected CoPay:      Pharmacy Filling the Rx: [id number] [address] [facility] AT [facility] OF [facility] & [facility]  Pharmacy Notified:  Yes  Patient Notified:  No    Pharmacy stated that PA is Not Needed at This Time and medication is to be set to fill on 7/2/2025 per pharmacy. Pharmacy stated that they initially had issues processing medication due to diagnosis and day supply but current its been corrected and mediation should be able to process on 7/2/2025. Requested pharmacy to notify patient on medication coverage and available fill date on medication.

## 2025-08-20 ENCOUNTER — LAB (OUTPATIENT)
Dept: LAB | Facility: CLINIC | Age: 30
End: 2025-08-20
Payer: MEDICAID

## 2025-08-20 DIAGNOSIS — E11.9 DIABETES MELLITUS, TYPE 2 (H): Primary | ICD-10-CM

## 2025-08-20 DIAGNOSIS — Z51.81 ENCOUNTER FOR THERAPEUTIC DRUG MONITORING: ICD-10-CM

## 2025-08-20 DIAGNOSIS — F64.0 GENDER DYSPHORIA IN ADULT: ICD-10-CM

## 2025-08-20 LAB
BASOPHILS # BLD AUTO: <0.04 10E3/UL (ref 0–0.2)
BASOPHILS NFR BLD AUTO: 0.3 %
CHOLEST SERPL-MCNC: 115 MG/DL
EOSINOPHIL # BLD AUTO: 0.1 10E3/UL (ref 0–0.7)
EOSINOPHIL NFR BLD AUTO: 1.1 %
ERYTHROCYTE [DISTWIDTH] IN BLOOD BY AUTOMATED COUNT: 12.6 % (ref 10–15)
ESTRADIOL SERPL-MCNC: 33 PG/ML
FASTING STATUS PATIENT QL REPORTED: YES
HCT VFR BLD AUTO: 51.2 % (ref 35–53)
HDLC SERPL-MCNC: 44 MG/DL
HGB BLD-MCNC: 17.1 G/DL (ref 11.7–17.7)
IMM GRANULOCYTES # BLD: <0.04 10E3/UL
IMM GRANULOCYTES NFR BLD: 0.3 %
LDLC SERPL CALC-MCNC: 53 MG/DL
LYMPHOCYTES # BLD AUTO: 2.53 10E3/UL (ref 0.8–5.3)
LYMPHOCYTES NFR BLD AUTO: 28.9 %
MCH RBC QN AUTO: 30.9 PG (ref 26.5–33)
MCHC RBC AUTO-ENTMCNC: 33.4 G/DL (ref 31.5–36.5)
MCV RBC AUTO: 92.4 FL (ref 78–100)
MONOCYTES # BLD AUTO: 0.57 10E3/UL (ref 0–1.3)
MONOCYTES NFR BLD AUTO: 6.5 %
NEUTROPHILS # BLD AUTO: 5.49 10E3/UL (ref 1.6–8.3)
NEUTROPHILS NFR BLD AUTO: 62.9 %
NONHDLC SERPL-MCNC: 71 MG/DL
PLATELET # BLD AUTO: 213 10E3/UL (ref 150–450)
RBC # BLD AUTO: 5.54 10E6/UL (ref 3.8–5.9)
SHBG SERPL-SCNC: 37 NMOL/L (ref 11–135)
TRIGL SERPL-MCNC: 91 MG/DL
WBC # BLD AUTO: 8.75 10E3/UL (ref 4–11)

## 2025-08-20 PROCEDURE — 36415 COLL VENOUS BLD VENIPUNCTURE: CPT

## 2025-08-20 PROCEDURE — 84403 ASSAY OF TOTAL TESTOSTERONE: CPT | Performed by: FAMILY MEDICINE

## 2025-08-20 PROCEDURE — 85025 COMPLETE CBC W/AUTO DIFF WBC: CPT | Mod: QW

## 2025-08-20 PROCEDURE — 80061 LIPID PANEL: CPT | Performed by: FAMILY MEDICINE

## 2025-08-20 PROCEDURE — 84270 ASSAY OF SEX HORMONE GLOBUL: CPT | Performed by: FAMILY MEDICINE

## 2025-08-20 PROCEDURE — 82670 ASSAY OF TOTAL ESTRADIOL: CPT | Performed by: FAMILY MEDICINE

## 2025-08-27 ASSESSMENT — PATIENT HEALTH QUESTIONNAIRE - PHQ9
SUM OF ALL RESPONSES TO PHQ QUESTIONS 1-9: 8
10. IF YOU CHECKED OFF ANY PROBLEMS, HOW DIFFICULT HAVE THESE PROBLEMS MADE IT FOR YOU TO DO YOUR WORK, TAKE CARE OF THINGS AT HOME, OR GET ALONG WITH OTHER PEOPLE: SOMEWHAT DIFFICULT
SUM OF ALL RESPONSES TO PHQ QUESTIONS 1-9: 8

## 2025-08-27 ASSESSMENT — ASTHMA QUESTIONNAIRES
QUESTION_3 LAST FOUR WEEKS HOW OFTEN DID YOUR ASTHMA SYMPTOMS (WHEEZING, COUGHING, SHORTNESS OF BREATH, CHEST TIGHTNESS OR PAIN) WAKE YOU UP AT NIGHT OR EARLIER THAN USUAL IN THE MORNING: TWO OR THREE NIGHTS A WEEK
QUESTION_1 LAST FOUR WEEKS HOW MUCH OF THE TIME DID YOUR ASTHMA KEEP YOU FROM GETTING AS MUCH DONE AT WORK, SCHOOL OR AT HOME: MOST OF THE TIME
ACT_TOTALSCORE: 10
QUESTION_2 LAST FOUR WEEKS HOW OFTEN HAVE YOU HAD SHORTNESS OF BREATH: MORE THAN ONCE A DAY
QUESTION_4 LAST FOUR WEEKS HOW OFTEN HAVE YOU USED YOUR RESCUE INHALER OR NEBULIZER MEDICATION (SUCH AS ALBUTEROL): ONE OR TWO TIMES PER DAY
QUESTION_5 LAST FOUR WEEKS HOW WOULD YOU RATE YOUR ASTHMA CONTROL: SOMEWHAT CONTROLLED

## 2025-08-27 ASSESSMENT — ANXIETY QUESTIONNAIRES
6. BECOMING EASILY ANNOYED OR IRRITABLE: MORE THAN HALF THE DAYS
GAD7 TOTAL SCORE: 9
1. FEELING NERVOUS, ANXIOUS, OR ON EDGE: SEVERAL DAYS
8. IF YOU CHECKED OFF ANY PROBLEMS, HOW DIFFICULT HAVE THESE MADE IT FOR YOU TO DO YOUR WORK, TAKE CARE OF THINGS AT HOME, OR GET ALONG WITH OTHER PEOPLE?: VERY DIFFICULT
2. NOT BEING ABLE TO STOP OR CONTROL WORRYING: SEVERAL DAYS
7. FEELING AFRAID AS IF SOMETHING AWFUL MIGHT HAPPEN: SEVERAL DAYS
3. WORRYING TOO MUCH ABOUT DIFFERENT THINGS: SEVERAL DAYS
GAD7 TOTAL SCORE: 9
4. TROUBLE RELAXING: SEVERAL DAYS
5. BEING SO RESTLESS THAT IT IS HARD TO SIT STILL: MORE THAN HALF THE DAYS
7. FEELING AFRAID AS IF SOMETHING AWFUL MIGHT HAPPEN: SEVERAL DAYS
GAD7 TOTAL SCORE: 9
IF YOU CHECKED OFF ANY PROBLEMS ON THIS QUESTIONNAIRE, HOW DIFFICULT HAVE THESE PROBLEMS MADE IT FOR YOU TO DO YOUR WORK, TAKE CARE OF THINGS AT HOME, OR GET ALONG WITH OTHER PEOPLE: VERY DIFFICULT

## 2025-08-28 ENCOUNTER — OFFICE VISIT (OUTPATIENT)
Dept: FAMILY MEDICINE | Facility: CLINIC | Age: 30
End: 2025-08-28
Payer: MEDICAID

## 2025-08-28 VITALS
TEMPERATURE: 97.3 F | HEIGHT: 68 IN | SYSTOLIC BLOOD PRESSURE: 104 MMHG | HEART RATE: 79 BPM | OXYGEN SATURATION: 96 % | DIASTOLIC BLOOD PRESSURE: 74 MMHG | RESPIRATION RATE: 16 BRPM | BODY MASS INDEX: 38.73 KG/M2

## 2025-08-28 DIAGNOSIS — R25.3 MUSCLE TWITCH: ICD-10-CM

## 2025-08-28 DIAGNOSIS — M62.81 GENERALIZED MUSCLE WEAKNESS: Primary | ICD-10-CM

## 2025-08-28 DIAGNOSIS — E34.9 HORMONE DEFICIENCY: ICD-10-CM

## 2025-08-28 DIAGNOSIS — M62.830 SPASM OF BACK MUSCLES: ICD-10-CM

## 2025-08-28 DIAGNOSIS — R13.10 DIFFICULTY SWALLOWING PILLS: ICD-10-CM

## 2025-08-28 DIAGNOSIS — E11.9 TYPE 2 DIABETES MELLITUS WITHOUT COMPLICATION, WITHOUT LONG-TERM CURRENT USE OF INSULIN (H): ICD-10-CM

## 2025-08-28 RX ORDER — SEMAGLUTIDE 2.68 MG/ML
INJECTION, SOLUTION SUBCUTANEOUS
COMMUNITY
Start: 2025-07-15

## 2025-08-28 RX ORDER — TESTOSTERONE CYPIONATE 200 MG/ML
250 INJECTION, SOLUTION INTRAMUSCULAR
Qty: 12 ML | Refills: 0 | Status: SHIPPED | OUTPATIENT
Start: 2025-08-28

## 2025-08-28 RX ORDER — QUETIAPINE FUMARATE 25 MG/1
25 TABLET, FILM COATED ORAL EVERY EVENING
COMMUNITY
Start: 2025-08-20

## 2025-08-28 RX ORDER — TIRZEPATIDE 2.5 MG/.5ML
2.5 INJECTION, SOLUTION SUBCUTANEOUS WEEKLY
Qty: 2 ML | Refills: 0 | Status: SHIPPED | OUTPATIENT
Start: 2025-08-28

## 2025-08-28 RX ORDER — TIRZEPATIDE 5 MG/.5ML
5 INJECTION, SOLUTION SUBCUTANEOUS WEEKLY
Qty: 2 ML | Refills: 0 | Status: SHIPPED | OUTPATIENT
Start: 2025-09-25

## 2025-08-28 RX ORDER — FLUCONAZOLE 150 MG/1
TABLET ORAL
COMMUNITY
Start: 2025-07-09

## 2025-08-28 RX ORDER — TIRZEPATIDE 7.5 MG/.5ML
7.5 INJECTION, SOLUTION SUBCUTANEOUS WEEKLY
Qty: 2 ML | Refills: 0 | Status: SHIPPED | OUTPATIENT
Start: 2025-10-23

## 2025-08-28 RX ORDER — BUSPIRONE HYDROCHLORIDE 10 MG/1
2 TABLET ORAL
COMMUNITY
Start: 2025-08-25 | End: 2025-08-28

## 2025-09-01 ENCOUNTER — PATIENT OUTREACH (OUTPATIENT)
Dept: CARE COORDINATION | Facility: CLINIC | Age: 30
End: 2025-09-01
Payer: MEDICAID